# Patient Record
Sex: MALE | Race: WHITE | Employment: OTHER | URBAN - NONMETROPOLITAN AREA
[De-identification: names, ages, dates, MRNs, and addresses within clinical notes are randomized per-mention and may not be internally consistent; named-entity substitution may affect disease eponyms.]

---

## 2017-01-11 ENCOUNTER — OFFICE VISIT - GICH (OUTPATIENT)
Dept: SURGERY | Facility: OTHER | Age: 72
End: 2017-01-11

## 2017-01-11 DIAGNOSIS — Z98.890 OTHER SPECIFIED POSTPROCEDURAL STATES: ICD-10-CM

## 2017-01-13 ENCOUNTER — COMMUNICATION - GICH (OUTPATIENT)
Dept: FAMILY MEDICINE | Facility: OTHER | Age: 72
End: 2017-01-13

## 2017-01-13 DIAGNOSIS — E78.5 HYPERLIPIDEMIA: ICD-10-CM

## 2017-05-16 ENCOUNTER — AMBULATORY - GICH (OUTPATIENT)
Dept: FAMILY MEDICINE | Facility: OTHER | Age: 72
End: 2017-05-16

## 2017-05-16 DIAGNOSIS — L30.9 DERMATITIS: ICD-10-CM

## 2017-05-19 ENCOUNTER — COMMUNICATION - GICH (OUTPATIENT)
Dept: FAMILY MEDICINE | Facility: OTHER | Age: 72
End: 2017-05-19

## 2017-05-19 ENCOUNTER — AMBULATORY - GICH (OUTPATIENT)
Dept: FAMILY MEDICINE | Facility: OTHER | Age: 72
End: 2017-05-19

## 2017-05-19 DIAGNOSIS — L30.9 DERMATITIS: ICD-10-CM

## 2017-05-23 ENCOUNTER — AMBULATORY - GICH (OUTPATIENT)
Dept: FAMILY MEDICINE | Facility: OTHER | Age: 72
End: 2017-05-23

## 2017-05-23 ENCOUNTER — COMMUNICATION - GICH (OUTPATIENT)
Dept: FAMILY MEDICINE | Facility: OTHER | Age: 72
End: 2017-05-23

## 2017-05-23 DIAGNOSIS — N13.8 OTHER OBSTRUCTIVE AND REFLUX UROPATHY: ICD-10-CM

## 2017-05-23 DIAGNOSIS — L30.9 DERMATITIS: ICD-10-CM

## 2017-05-23 DIAGNOSIS — N40.1 ENLARGED PROSTATE WITH LOWER URINARY TRACT SYMPTOMS (LUTS): ICD-10-CM

## 2017-06-22 ENCOUNTER — COMMUNICATION - GICH (OUTPATIENT)
Dept: FAMILY MEDICINE | Facility: OTHER | Age: 72
End: 2017-06-22

## 2017-06-22 DIAGNOSIS — N52.9 MALE ERECTILE DYSFUNCTION: ICD-10-CM

## 2017-06-26 ENCOUNTER — COMMUNICATION - GICH (OUTPATIENT)
Dept: FAMILY MEDICINE | Facility: OTHER | Age: 72
End: 2017-06-26

## 2017-06-26 DIAGNOSIS — N52.9 MALE ERECTILE DYSFUNCTION: ICD-10-CM

## 2017-07-24 ENCOUNTER — COMMUNICATION - GICH (OUTPATIENT)
Dept: FAMILY MEDICINE | Facility: OTHER | Age: 72
End: 2017-07-24

## 2017-07-24 DIAGNOSIS — E78.5 HYPERLIPIDEMIA: ICD-10-CM

## 2017-07-24 DIAGNOSIS — I10 ESSENTIAL (PRIMARY) HYPERTENSION: ICD-10-CM

## 2017-08-01 ENCOUNTER — COMMUNICATION - GICH (OUTPATIENT)
Dept: FAMILY MEDICINE | Facility: OTHER | Age: 72
End: 2017-08-01

## 2017-08-01 DIAGNOSIS — N40.1 ENLARGED PROSTATE WITH LOWER URINARY TRACT SYMPTOMS (LUTS): ICD-10-CM

## 2017-08-01 DIAGNOSIS — I10 ESSENTIAL (PRIMARY) HYPERTENSION: ICD-10-CM

## 2017-08-01 DIAGNOSIS — N13.8 OTHER OBSTRUCTIVE AND REFLUX UROPATHY: ICD-10-CM

## 2017-08-01 DIAGNOSIS — E78.5 HYPERLIPIDEMIA: ICD-10-CM

## 2017-08-01 DIAGNOSIS — N52.9 MALE ERECTILE DYSFUNCTION: ICD-10-CM

## 2017-08-02 ENCOUNTER — AMBULATORY - GICH (OUTPATIENT)
Dept: FAMILY MEDICINE | Facility: OTHER | Age: 72
End: 2017-08-02

## 2017-08-30 ENCOUNTER — HISTORY (OUTPATIENT)
Dept: FAMILY MEDICINE | Facility: OTHER | Age: 72
End: 2017-08-30

## 2017-08-30 ENCOUNTER — OFFICE VISIT - GICH (OUTPATIENT)
Dept: FAMILY MEDICINE | Facility: OTHER | Age: 72
End: 2017-08-30

## 2017-08-30 DIAGNOSIS — F41.1 GENERALIZED ANXIETY DISORDER: ICD-10-CM

## 2017-08-30 DIAGNOSIS — I60.9 NONTRAUMATIC SUBARACHNOID HEMORRHAGE (H): ICD-10-CM

## 2017-08-30 DIAGNOSIS — E78.5 HYPERLIPIDEMIA: ICD-10-CM

## 2017-08-30 DIAGNOSIS — N40.1 ENLARGED PROSTATE WITH LOWER URINARY TRACT SYMPTOMS (LUTS): ICD-10-CM

## 2017-08-30 DIAGNOSIS — I10 ESSENTIAL (PRIMARY) HYPERTENSION: ICD-10-CM

## 2017-08-30 DIAGNOSIS — S06.9X5S: ICD-10-CM

## 2017-08-30 DIAGNOSIS — R97.20 ELEVATED PROSTATE SPECIFIC ANTIGEN (PSA): ICD-10-CM

## 2017-08-30 DIAGNOSIS — N42.9 DISORDER OF PROSTATE: ICD-10-CM

## 2017-08-30 LAB
A/G RATIO - HISTORICAL: 1.8 (ref 1–2)
ABSOLUTE BASOPHILS - HISTORICAL: 0 THOU/CU MM
ABSOLUTE EOSINOPHILS - HISTORICAL: 0.1 THOU/CU MM
ABSOLUTE IMMATURE GRANULOCYTES(METAS,MYELOS,PROS) - HISTORICAL: 0 THOU/CU MM
ABSOLUTE LYMPHOCYTES - HISTORICAL: 0.9 THOU/CU MM (ref 0.9–2.9)
ABSOLUTE MONOCYTES - HISTORICAL: 0.6 THOU/CU MM
ABSOLUTE NEUTROPHILS - HISTORICAL: 3.4 THOU/CU MM (ref 1.7–7)
ALBUMIN SERPL-MCNC: 4.5 G/DL (ref 3.5–5.7)
ALP SERPL-CCNC: 45 IU/L (ref 34–104)
ALT (SGPT) - HISTORICAL: 12 IU/L (ref 7–52)
ANION GAP - HISTORICAL: 11 (ref 5–18)
AST SERPL-CCNC: 23 IU/L (ref 13–39)
BASOPHILS # BLD AUTO: 0.6 %
BILIRUB SERPL-MCNC: 1 MG/DL (ref 0.3–1)
BUN SERPL-MCNC: 15 MG/DL (ref 7–25)
BUN/CREAT RATIO - HISTORICAL: 15
CALCIUM SERPL-MCNC: 9.2 MG/DL (ref 8.6–10.3)
CHLORIDE SERPLBLD-SCNC: 105 MMOL/L (ref 98–107)
CHOL/HDL RATIO - HISTORICAL: 3.11
CHOLESTEROL TOTAL: 196 MG/DL
CO2 SERPL-SCNC: 24 MMOL/L (ref 21–31)
CREAT SERPL-MCNC: 0.97 MG/DL (ref 0.7–1.3)
EOSINOPHIL NFR BLD AUTO: 1.8 %
ERYTHROCYTE [DISTWIDTH] IN BLOOD BY AUTOMATED COUNT: 12.5 % (ref 11.5–15.5)
GFR IF NOT AFRICAN AMERICAN - HISTORICAL: >60 ML/MIN/1.73M2
GLOBULIN - HISTORICAL: 2.5 G/DL (ref 2–3.7)
GLUCOSE SERPL-MCNC: 106 MG/DL (ref 70–105)
HCT VFR BLD AUTO: 40.9 % (ref 37–53)
HDLC SERPL-MCNC: 63 MG/DL (ref 23–92)
HEMOGLOBIN: 14 G/DL (ref 13.5–17.5)
IMMATURE GRANULOCYTES(METAS,MYELOS,PROS) - HISTORICAL: 0.2 %
LDLC SERPL CALC-MCNC: 113 MG/DL
LYMPHOCYTES NFR BLD AUTO: 18.6 % (ref 20–44)
MCH RBC QN AUTO: 31.5 PG (ref 26–34)
MCHC RBC AUTO-ENTMCNC: 34.2 G/DL (ref 32–36)
MCV RBC AUTO: 92 FL (ref 80–100)
MONOCYTES NFR BLD AUTO: 11.7 %
NEUTROPHILS NFR BLD AUTO: 67.1 % (ref 42–72)
NON-HDL CHOLESTEROL - HISTORICAL: 133 MG/DL
PATIENT STATUS - HISTORICAL: ABNORMAL
PLATELET # BLD AUTO: 187 THOU/CU MM (ref 140–440)
PMV BLD: 9 FL (ref 6.5–11)
POTASSIUM SERPL-SCNC: 3.8 MMOL/L (ref 3.5–5.1)
PROT SERPL-MCNC: 7 G/DL (ref 6.4–8.9)
PSA TOTAL (DIAGNOSTIC) - HISTORICAL: 5.23 NG/ML
RED BLOOD COUNT - HISTORICAL: 4.44 MIL/CU MM (ref 4.3–5.9)
SODIUM SERPL-SCNC: 140 MMOL/L (ref 133–143)
TRIGL SERPL-MCNC: 101 MG/DL
TSH - HISTORICAL: 2.79 UIU/ML (ref 0.34–5.6)
WHITE BLOOD COUNT - HISTORICAL: 5.1 THOU/CU MM (ref 4.5–11)

## 2017-08-30 ASSESSMENT — ANXIETY QUESTIONNAIRES
7. FEELING AFRAID AS IF SOMETHING AWFUL MIGHT HAPPEN: NOT AT ALL
2. NOT BEING ABLE TO STOP OR CONTROL WORRYING: NOT AT ALL
6. BECOMING EASILY ANNOYED OR IRRITABLE: NOT AT ALL
5. BEING SO RESTLESS THAT IT IS HARD TO SIT STILL: NOT AT ALL
1. FEELING NERVOUS, ANXIOUS, OR ON EDGE: SEVERAL DAYS
GAD7 TOTAL SCORE: 1
4. TROUBLE RELAXING: NOT AT ALL
3. WORRYING TOO MUCH ABOUT DIFFERENT THINGS: NOT AT ALL

## 2017-10-08 ENCOUNTER — AMBULATORY - GICH (OUTPATIENT)
Dept: FAMILY MEDICINE | Facility: OTHER | Age: 72
End: 2017-10-08
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
## 2017-10-09 ENCOUNTER — OFFICE VISIT - GICH (OUTPATIENT)
Dept: FAMILY MEDICINE | Facility: OTHER | Age: 72
End: 2017-10-09

## 2017-10-09 ENCOUNTER — AMBULATORY - GICH (OUTPATIENT)
Dept: FAMILY MEDICINE | Facility: OTHER | Age: 72
End: 2017-10-09

## 2017-11-21 ENCOUNTER — AMBULATORY - GICH (OUTPATIENT)
Dept: SCHEDULING | Facility: OTHER | Age: 72
End: 2017-11-21

## 2017-12-13 ENCOUNTER — COMMUNICATION - GICH (OUTPATIENT)
Dept: FAMILY MEDICINE | Facility: OTHER | Age: 72
End: 2017-12-13

## 2017-12-13 DIAGNOSIS — N13.8 OTHER OBSTRUCTIVE AND REFLUX UROPATHY: ICD-10-CM

## 2017-12-13 DIAGNOSIS — I10 ESSENTIAL (PRIMARY) HYPERTENSION: ICD-10-CM

## 2017-12-13 DIAGNOSIS — E78.5 HYPERLIPIDEMIA: ICD-10-CM

## 2017-12-13 DIAGNOSIS — N40.1 ENLARGED PROSTATE WITH LOWER URINARY TRACT SYMPTOMS (LUTS): ICD-10-CM

## 2017-12-27 NOTE — PROGRESS NOTES
Patient Information     Patient Name MRN Chao Brizuela [id number] Male 1945      Progress Notes by Chan Cook MD at 2017  8:45 AM     Author:  Chan Cook MD Service:  (none) Author Type:  Physician     Filed:  2017  9:43 AM Encounter Date:  2017 Status:  Signed     :  Chan Cook MD (Physician)            Nursing Notes:   Dorothea Shen  2017  9:22 AM  Signed  Pt presents for medication refills and mole check.  Dorothea Shen ....................  2017   8:55 AM      SUBJECTIVE:  Chao Kearney  is a 71 y.o. male who comes in today for follow-up and medication refill. I last saw him about a year ago when he had a hernia. He had it repaired and did well. He also had cataract surgery last year.    He had a significant hemorrhagic stroke from which he's had nearly a full recovery except for some intermittent trouble with short-term memory. He is exercising daily, looking forward to bow hunting, and is doing well. He had a previous elevated PSA and that's been evaluated and showed no evidence of cancer.    He is up-to-date on health maintenance issues. He is feeling well. He works out a lot. He does 5 days a week one hour per day. He does the [employer] for 30 minutes. He walks for 3 miles 7 days per week.  He enjoys bow hunting.  He is able to recite the machines he uses in order.     Past Medical, Family, and Social History reviewed and updated as noted below.   ROS is negative except as noted above       No Known Allergies,   Family History       Problem   Relation Age of Onset     Heart Disease  Father 50     heart failure        Other  Father       MS, long-standing       Cancer  Mother 78      lung cancer at age 78, developed brain metastases        Cancer  Paternal Grandfather       of cancer of unknown type       Other  Sister       MS     ,   Current Outpatient Prescriptions on File Prior to Visit       Medication  Sig Dispense Refill      aspirin enteric coated (ASPIR-81) 81 mg tablet Take 81 mg by mouth once daily with a meal.       cholecalciferol (VITAMIN D) 1,000 unit capsule Take 1 capsule by mouth once daily.  0     hydroCHLOROthiazide 12.5 mg tablet Take 1 tablet by mouth once daily. 90 tablet 3     lisinopril (PRINIVIL; ZESTRIL) 40 mg tablet Take 1 tablet by mouth once daily. 90 tablet 3     Multivitamin,Tx-Iron-Minerals (MULTILEX-T&M) tab Take 1 Tab by mouth once daily.  0     rosuvastatin (CRESTOR) 20 mg tablet Take 1 tablet by mouth at bedtime. 90 tablet 3     sildenafil (REVATIO) 20 mg tablet 2-3 tab po daily prn prior to sexual activity 30 tablet prn     sildenafil citrate (VIAGRA) 100 mg tablet Take 1 tablet by mouth once daily if needed for Erectile Dysfunction. Take 30min to 4 hours before sexual activity. Max 100mg/24hr. 6 tablet 11     tamsulosin (FLOMAX) 0.4 mg capsule Take 2 capsules by mouth once daily after a meal. 180 capsule prn     triamcinolone (ARISTOCORT; KENALOG) 0.1 % cream Apply  topically to affected area(s) 3 times daily. 30 g 3     No current facility-administered medications on file prior to visit.    ,   Past Medical History:     Diagnosis  Date     Abnormal prostate specific antigen      Actinic keratosis      Anemia      Bony growth 1/05     Lingual sean, bony growth, lower mandible, evaluation Dr. Marin 1/05       Brain injury (HC)      Degenerative joint disease (DJD) of hip      Diverticular disease of left colon      Generalized anxiety disorder      Hx of adenomatous colonic polyps      Hx of complete electrocardiogram 05/16/05    Q wave in 3 and AVF leads on electrocardiogram done       Hydrocephalus     (obstructive)      Hyperlipemia      Hypertension      Joint stiffness      Lesion of mouth     Floor of mouth lesion, lower mandible lesion, evaluation Dr. Marin 1/24/05       Normal stress echocardiogram 05/20/2005    Stress echocardiogram is negative for ischemia with ejection fraction of 85%       Plantar fasciitis, right      Right hip pain      SAH (subarachnoid hemorrhage) (HC)      Seborrheic keratosis      Urinary retention    ,   Patient Active Problem List       Diagnosis  Date Noted     Benign non-nodular prostatic hyperplasia with lower urinary tract symptoms  07/27/2016     Brain injury (HC)  01/16/2015     Urinary retention  12/12/2014     Right hip pain  10/10/2014     SAH (subarachnoid hemorrhage) (HC)  09/22/2014     Obstructive Hydrocephalus  09/22/2014     Anemia  09/22/2014     Actinic keratosis  01/28/2014     Seborrheic keratosis  01/28/2014     H/O adenomatous polyp of colon  04/27/2010     Diverticulosis of sigmoid colon  04/26/2010     HYPERLIPIDEMIA       PLANTAR FASCIITIS       right          HYPERTENSION       ELEVATED PROSTATE SPECIFIC ANTIGEN       in October 2005.  The patient is taking Levaquin daily for two months.  He   will have another PSA checked in October 2006, recommended by Dr. Wilson.          ANXIETY DISORDER, GENERALIZED     ,   Past Surgical History:      Procedure  Laterality Date     CATARACT REMOVAL Bilateral 2016     COLONOSCOPY DIAGNOSTIC  10/15/15    F/U 2025       COLONOSCOPY SCREENING  (04/26/2010)    F/U 2015       CRANIOTOMY  2014     HERNIA REPAIR Left 11/29/2016    LIH with mesh       FL TOTAL HIP ARTHROPLASTY Left 3/26/14    direct anterior approach, Dr. Lama      and   Social History      Substance Use Topics        Smoking status:  Never Smoker     Smokeless tobacco:  Never Used     Alcohol use  0.0 oz/week     0 Standard drinks or equivalent per week      OBJECTIVE:  /80  Pulse 60  Wt 86 kg (189 lb 9.6 oz)  BMI 28.83 kg/m2   EXAM:  General Appearance: Pleasant, alert, appropriate appearance for age. No acute distress  Head Exam: Normal. Normocephalic, atraumatic.  Eye Exam:  Normal external eye, conjunctiva, lids, cornea. IMNDY. EOMI  Ear Exam: Normal TM's bilaterally. Normal auditory canals and external ears. Non-tender.  Nose Exam: Normal  external nose, mucus membranes, and septum.  OroPharynx Exam:  Dental hygiene adequate. Normal buccal mucosa. Normal pharynx.  Neck Exam:  Supple, no masses or nodes. No audible bruits  Thyroid Exam: No nodules or enlargement.  Chest/Respiratory Exam: Normal chest wall and respirations. Clear to auscultation.  Cardiovascular Exam: Regular rate and rhythm. S1, S2, no murmur, click, gallop, or rubs.  Gastrointestinal Exam: Soft, non-tender, no masses or organomegaly.  Lymphatic Exam: Non-palpable nodes in neck, clavicular, axillary, or inguinal regions.  Musculoskeletal Exam: Back is straight and non-tender, full ROM of upper and lower extremities.  Foot Exam: Left and right foot: good pedal pulses  Skin: no rash or abnormalities  Neurologic Exam: Nonfocal, normal gross motor, tone coordination and no tremor.  Psychiatric Exam: Alert and oriented - appropriate affect.   ASSESSMENT/Plan :      Chao was seen today for medication management and derm problem.    Diagnoses and all orders for this visit:    HYPERTENSION  -     CBC AND DIFFERENTIAL; Future  -     COMP METABOLIC PANEL; Future  -     LIPID PANEL; Future    Hyperlipidemia, unspecified hyperlipidemia type  -     COMP METABOLIC PANEL; Future  -     LIPID PANEL; Future  -     TSH; Future    ELEVATED PROSTATE SPECIFIC ANTIGEN  -     PSA TOTAL (DIAGNOSTIC); Future    ANXIETY DISORDER, GENERALIZED  -     TSH; Future    Brain injury, with LOC > 24 hr with return to prior conscious level, sequela    Benign non-nodular prostatic hyperplasia with lower urinary tract symptoms    SAH (subarachnoid hemorrhage) (HC)    Prostate disorder  -     PSA TOTAL (DIAGNOSTIC); Future    Will notify of lab results when available. Discussed diet, exercise and healthy lifestyle. Continue current medications.   Follow up one year, sooner prn.       Chan Cook MD

## 2017-12-27 NOTE — PROGRESS NOTES
Patient Information     Patient Name MRN Sex Chao Tran [id number] Male 1945      Progress Notes by Paige Batista NP at 10/9/2017  8:45 AM     Author:  Paige Batista NP Service:  (none) Author Type:  PHYS- Nurse Practitioner     Filed:  10/15/2017  4:34 PM Encounter Date:  10/9/2017 Status:  Signed     :  Paige Batista NP (PHYS- Nurse Practitioner)            SUBJECTIVE: Erroneous    Chao Kearney is a 71 y.o. male who presents for     HPI    REVIEW OF SYSTEMS:  ROS    OBJECTIVE:  There were no vitals taken for this visit.    EXAM:   Physical Exam    ASSESSMENT/PLAN:  No diagnosis found.     Plan:

## 2017-12-28 NOTE — TELEPHONE ENCOUNTER
Patient Information     Patient Name MRN Sex Chao Tran [id number] Male 1945      Telephone Encounter by Chan Cook MD at 2017  5:26 PM     Author:  Chan Cook MD Service:  (none) Author Type:  Physician     Filed:  2017  5:26 PM Encounter Date:  2017 Status:  Signed     :  Chan Cook MD (Physician)            Prescription printed and signed.  Chan Cook MD ....................  2017   5:26 PM

## 2017-12-28 NOTE — TELEPHONE ENCOUNTER
Patient Information     Patient Name MRN Sex Chao Tran [id number] Male 1945      Telephone Encounter by Mandy Mcfarlane at 2017  3:47 PM     Author:  Mandy Mcfarlane Service:  (none) Author Type:  (none)     Filed:  2017  3:59 PM Encounter Date:  2017 Status:  Signed     :  Mandy Mcfarlane            The patient stated this was a mistake. He did not need his viagra refilled. He did need some of his Rx's refilled to [facility] though.  Mandy Mcfarlane LPN..................2017   3:58 PM

## 2017-12-28 NOTE — TELEPHONE ENCOUNTER
Patient Information     Patient Name MRN Sex Chao Tran [id number] Male 1945      Telephone Encounter by Juan Arora LPN at 2017  8:11 AM     Author:  Juan Arora LPN Service:  (none) Author Type:  NURS- Licensed Practical Nurse     Filed:  2017  8:12 AM Encounter Date:  2017 Status:  Signed     :  Juan Arora LPN (NURS- Licensed Practical Nurse)            Called to let patient's wife know the Rx was faxed this morning.  Juan Arora LPN ..............2017 8:12 AM

## 2017-12-28 NOTE — TELEPHONE ENCOUNTER
Patient Information     Patient Name MRN Sex Chao Tran [id number] Male 1945      Telephone Encounter by Ita Townsend at 2017  9:28 AM     Author:  Ita Townsend Service:  (none) Author Type:  (none)     Filed:  2017  9:33 AM Encounter Date:  2017 Status:  Signed     :  Ita Townsend            Spoke with patients wife. States patient had rx for Viagra sent to [facility], but they found out that a friend got his rx filled at a pharmacy in [place] for cheaper. Patient needs written rx to be faxed to:  [facility] [place]  Fax [phone]  Rx # [id number]

## 2017-12-28 NOTE — TELEPHONE ENCOUNTER
Patient Information     Patient Name MRN Sex Chao Tran [id number] Male 1945      Telephone Encounter by Annmarie Claros at 2017  1:07 PM     Author:  Annmarie Claros Service:  (none) Author Type:  (none)     Filed:  2017  1:12 PM Encounter Date:  2017 Status:  Signed     :  Annmarie Claros            Patient has a prescription for Viagra 100 mg, he would like the generic Sildenafil instead due to cost.        According to pharmacy, the Sildenafil 20 mg is what they were referring to.  (which he would take 2 or more at a time)    Annmarie Claros LPN....................2017 1:09 PM

## 2017-12-28 NOTE — TELEPHONE ENCOUNTER
Patient Information     Patient Name MRN Sex Chao Tran [id number] Male 1945      Telephone Encounter by Mandy Villa RN at 2017  9:06 AM     Author:  Mandy Villa RN Service:  (none) Author Type:  NURS- Registered Nurse     Filed:  2017  9:09 AM Encounter Date:  2017 Status:  Signed     :  Mandy Villa RN (NURS- Registered Nurse)            Ace Inhibitors    Office visit in the past 12 months or per provider note.    Last visit with WALDO LEMA was on: 10/18/2016 in [facility]  Next visit with WALDO LEMA is on: No future appointment listed with this provider  Next visit with Family Practice is on: No future appointment listed in this department    Lab test requirements:  Creatinine and Potassium annually, if ordering lab, order BMP.  CREATININE (mg/dL)    Date Value   2016 1.00     POTASSIUM (mmol/L)    Date Value   2016 4.0       Max refill for 12 months from last office visit or per provider note  Diuretics (may be prescribed for edema)    Office visit in the past 12 months or per provider note.      Lab testing requirements:  Creatinine and Potassium annually, if ordering lab, order BMP.  CREATININE (mg/dL)    Date Value   2016 1.00     POTASSIUM (mmol/L)    Date Value   2016 4.0     BP Readings from Last 4 Encounters:    17 120/64   16 120/62   16 113/61   16 120/78         Review last provider visit note.  If BP reviewed and plan is noted, can refill.  Max refill for 12 months from last office visit or per provider note.  Statins    Office visit in the past 12 months.      Lab testing requirements:  Lipids annually.  Repeat lipids 6-8 weeks after dosage or drug change.    Last Lipids:  Chol: 156    3/11/2015  T    3/11/2015  HDL:   52    3/11/2015  LDL:  88    3/11/2015  LDL DIRECT:  No results found in past 5 years    .    Concommitant use of fibrates and statins-If it is an addition  to the medication list, review note and/or discuss with provider.  If already on medication list, refill.    Max refills 12 months from last office visit.    Prescription refilled per RN Medication Refill Policy.................... SRINI ASKEW RN ....................  7/27/2017   9:07 AM

## 2017-12-28 NOTE — TELEPHONE ENCOUNTER
Patient Information     Patient Name MRN Sex Chao Tran [id number] Male 1945      Telephone Encounter by Darling Bush RN at 2017 12:07 PM     Author:  Darling Bush RN Service:  (none) Author Type:  NURS- Registered Nurse     Filed:  2017 12:20 PM Encounter Date:  2017 Status:  Signed     :  Darling Bush RN (NURS- Registered Nurse)            This is a Refill request from: Per call center-patient calls requesting refill of Viagra sent to [facility]. Patient unavailable via phone-line busy    Per 17 telephone encounter-Spoke with patients wife. States patient had rx for Viagra sent to [facility], but they found out that a friend got his rx filled at a pharmacy in [place] for cheaper. Patient needs written rx to be faxed to:  [facility]  Fax [phone]  Rx # [id number]    Name of Medication: Viagra 100 mg  Quantity requested: 6 tabs x 11 refills  Last fill date: 17 -6 tabs x 11 refills  Due for refill: ?  Last visit with WALDO LEMA was on: 10/18/2016   Diagnosis r/t this medication request: Erectile dysfunction     Unable to complete prescription refill per RN Medication Refill Policy.................... Darling Bush RN ....................  2017   12:10 PM

## 2017-12-29 NOTE — PATIENT INSTRUCTIONS
Patient Information     Patient Name MRN Sex Chao Tran [id number] Male 1945      Patient Instructions by Chan Cook MD at 2017  8:45 AM     Author:  Chan Cook MD  Service:  (none) Author Type:  Physician     Filed:  2017  9:40 AM  Encounter Date:  2017 Status:  Addendum     :  Chan Cook MD (Physician)        Related Notes: Original Note by Chan Cook MD (Physician) filed at 2017  9:29 AM            Ok to continue coenzyme q 10.    Flu shots aren't in yet.    Will notify you of lab results when available.     Continue current medications.

## 2017-12-30 NOTE — NURSING NOTE
Patient Information     Patient Name MRN Sex Chao Tran [id number] Male 1945      Nursing Note by Dorothea Shen at 2017  8:45 AM     Author:  Dorothea Shen Service:  (none) Author Type:  (none)     Filed:  2017  9:22 AM Encounter Date:  2017 Status:  Signed     :  Dorothea Shen            Pt presents for medication refills and mole check.  Dorothea Shen ....................  2017   8:55 AM

## 2017-12-30 NOTE — NURSING NOTE
Patient Information     Patient Name MRN Sex Chao Tran [id number] Male 1945      Nursing Note by Reba Jacques at 10/9/2017  8:45 AM     Author:  Reba Jacquse Service:  (none) Author Type:  (none)     Filed:  10/9/2017  8:20 AM Encounter Date:  10/9/2017 Status:  Signed     :  Reba Jacques            Patient presents to clinic today for tick bite on abdomen. He states he found it this past Saturday and it was very attached and had trouble getting it off, but is unsure if he got it all.  He states it is very itchy. He is currently taking doxycycline that was prescribed to his wife in .    Reba Jacques LPN...................10/9/2017  8:14 AM

## 2018-01-02 NOTE — NURSING NOTE
Patient Information     Patient Name MRN Sex Chao Tran [id number] Male 1945      Nursing Note by Madeline Gonzalez at 2017  9:40 AM     Author:  Madeline Gonzalez Service:  (none) Author Type:  (none)     Filed:  2017 10:19 AM Encounter Date:  2017 Status:  Signed     :  Madeline Gonzalez            Patient comes in for follow up on hernia.  Madeline Gonzalez LPN ....................  2017   10:19 AM

## 2018-01-03 NOTE — TELEPHONE ENCOUNTER
"Patient Information     Patient Name MRN Chao Brizuela [id number] Male 1945      Telephone Encounter by Darling Bush RN at 2017  8:24 AM     Author:  Darling Bush RN Service:  (none) Author Type:  NURS- Registered Nurse     Filed:  2017  8:32 AM Encounter Date:  2017 Status:  Signed     :  Darling Bush RN (NURS- Registered Nurse)            Wife calls for , \" My  is almost out of Crestor. He has 3 pills left. Could we get like 30 sent to a local pharmacy until [facility] sorts this out?  [facility]\".     2016  Ordered by: WALDO LEMA  Medication:rosuvastatin (CRESTOR) 20 mg tablet  Qty:90 tablet   Ref:4  Start:2016   End:  Route:Oral               ABAD:No   Class:eRX  Sig:Take 1 tablet by mouth at bedtime.  Pharmacy:[facility] - [address]    Statins  Office visit in the past 12 months.  Last visit with WALDO LEMA was on: 10/18/2016 in [facility]  Next visit with WALDO LEMA is on: No future appointment listed with this provider  Lab testing requirements:  Lipids annually.  Repeat lipids 6-8 weeks after dosage or drug change.  Last Lipids:  Chol: 156    3/11/2015  T    3/11/2015  HDL:   52    3/11/2015  LDL:  88    3/11/2015  LDL DIRECT:  No results found in past 5 years    .  Concommitant use of fibrates and statins-If it is an addition to the medication list, review note and/or discuss with provider.  If already on medication list, refill.  Max refills 12 months from last office visit.    Prescription refilled per RN Medication Refill Policy.................... Darling Bush RN ....................  2017   8:31 AM              "

## 2018-01-03 NOTE — PROGRESS NOTES
Patient Information     Patient Name MRN Sex Chao Tran [id number] Male 1945      Progress Notes by Laurent Gold MD at 2017  9:40 AM     Author:  Laurent Gold MD Service:  (none) Author Type:  Physician     Filed:  2017 10:37 AM Encounter Date:  2017 Status:  Signed     :  Laurent Gold MD (Physician)            Subjective:  This is a follow up after left inguinal hernia repair on 16. The patient has no complaints. He has resumed full activity.    Objective: /64  Temp 96.9  F (36.1  C) (Tympanic)  Wt 87.5 kg (193 lb)  BMI 29.35 kg/m2  The incision is healing well without erythema. No bulging    Assessment:   Doing well after left inguinal hernia repair    Plan:  Follow-up as needed

## 2018-01-05 NOTE — TELEPHONE ENCOUNTER
Patient Information     Patient Name MRN Sex Chao Tran [id number] Male 1945      Telephone Encounter by Eliu Breaux RN at 2017  3:19 PM     Author:  Eliu Breaux RN Service:  (none) Author Type:  NURS- Registered Nurse     Filed:  2017  3:28 PM Encounter Date:  2017 Status:  Signed     :  Eliu Breaux RN (NURS- Registered Nurse)            Redundant Refill Request for Triamcinolone refused;     Prescribing Provider: Waldo Cook MD                   Order Date: 2017  Ordered by: CHARLEY FERNANDES  Medication:triamcinolone (ARISTOCORT; KENALOG) 0.1 % cream    Qty:30 g        Ref:3  Start:2017   End:              Route:Topical               ABAD:No   Class:eRx    Sig:Apply  topically to affected area(s) 3 times daily.    Pharmacy:[facility] - [address]    Cosign accepted by WALDO COOK[[id number]] on 2017  8:08 AM    Unable to complete prescription refill per RN Medication Refill Policy.................... Eliu Breaux RN ....................  2017   3:20 PM

## 2018-01-05 NOTE — TELEPHONE ENCOUNTER
Patient Information     Patient Name MRN Sex Chao Tran [id number] Male 1945      Telephone Encounter by Eliu Breaux RN at 2017  8:58 AM     Author:  Eliu Breaux RN Service:  (none) Author Type:  NURS- Registered Nurse     Filed:  2017  9:07 AM Encounter Date:  2017 Status:  Signed     :  Eliu Breaux RN (NURS- Registered Nurse)            This is a Refill request from: Patient's wife  Name of Medication: Flomax  Quantity requested: 180 tabs  Last fill date: Unknown as request if from patient's wife  Due for refill: Yes, as per patient's wife  Last visit with WALDO COOK was on: 10/18/2016 in [facility]  PCP:  Waldo Cook MD  Controlled Substance Agreement:  N/A   Diagnosis r/t this medication request: BPH with urinary obstruction    Current rx in chart as listed below:    Documenting Provider: Waldo Cook MD                 Order Date: 2016  Ordered by: SRINI HERNÁNDEZ  Medication:tamsulosin (FLOMAX) 0.4 mg capsule    Qty:180 capsu*  Ref:3  Start:2016   End:              Route:Oral                  ABAD:No   Class:Historical Med    Sig:Take 1 capsule by mouth once daily after a meal.    Note to Pharmacy:Dose increased to 2 tab    Medication is listed as historical. Writer unable to find that PCP has addressed rx since 16, or on 16. Conflicting notation in rx as listed above. Is patient to be taking 2 tabs daily, or one tab daily? Writer did confirm dosing with patient's wife, Juana. She states patient is taking 2 tabs daily. Will update sig to reflect as per patient's wife report, and route request to PCP for his consideration/approval.     Unable to complete prescription refill per RN Medication Refill Policy.................... Eliu Breaux RN ....................  2017   8:59 AM

## 2018-01-05 NOTE — TELEPHONE ENCOUNTER
Patient Information     Patient Name MRN Sex Chao Tran [id number] Male 1945      Telephone Encounter by Annmarie Fernandes RN at 2017  3:58 PM     Author:  Annmarie Fernandes RN Service:  (none) Author Type:  NURS- Registered Nurse     Filed:  2017  4:01 PM Encounter Date:  2017 Status:  Signed     :  Annmarie Fernandes RN (NURS- Registered Nurse)            [facility] by Dr[facility] Cook, [facility]  Prescribing Provider: Waldo Cook MD                 Order Date: 2017  Ordered by: WALDO COOK V  Medication:triamcinolone (ARISTOCORT; KENALOG) 0.1 % cream    Qty:30 g        Ref:3  Start:2017   End:              Route:Topical               ABAD:No   Class:eRx    Sig:Apply  topically to affected area(s) 3 times daily.    Pharmacy:[facility] [address] [place] [address]   [address] - [phone]    Prescription refilled per RN Medication Refill Policy.................... ANNMARIE FERNANDES RN ....................  2017   4:00 PM

## 2018-01-15 ENCOUNTER — AMBULATORY - GICH (OUTPATIENT)
Dept: FAMILY MEDICINE | Facility: OTHER | Age: 73
End: 2018-01-15

## 2018-01-17 PROBLEM — R97.20 ELEVATED PROSTATE SPECIFIC ANTIGEN (PSA): Status: ACTIVE | Noted: 2018-01-17

## 2018-01-17 PROBLEM — I10 HYPERTENSION: Status: ACTIVE | Noted: 2018-01-17

## 2018-01-17 PROBLEM — F41.1 GENERALIZED ANXIETY DISORDER: Status: ACTIVE | Noted: 2018-01-17

## 2018-01-17 PROBLEM — M72.2 PLANTAR FASCIITIS: Status: ACTIVE | Noted: 2018-01-17

## 2018-01-17 PROBLEM — E78.5 HYPERLIPIDEMIA: Status: ACTIVE | Noted: 2018-01-17

## 2018-01-17 RX ORDER — ASPIRIN 81 MG/1
81 TABLET ORAL
Status: ON HOLD | COMMUNITY
End: 2022-05-18

## 2018-01-17 RX ORDER — HYDROCHLOROTHIAZIDE 12.5 MG/1
1 TABLET ORAL DAILY
COMMUNITY
Start: 2017-12-13 | End: 2018-07-10

## 2018-01-17 RX ORDER — LISINOPRIL 40 MG/1
1 TABLET ORAL DAILY
COMMUNITY
Start: 2017-12-13 | End: 2018-07-10

## 2018-01-17 RX ORDER — ROSUVASTATIN CALCIUM 20 MG/1
1 TABLET, COATED ORAL AT BEDTIME
COMMUNITY
Start: 2017-12-13 | End: 2018-07-10

## 2018-01-17 RX ORDER — SILDENAFIL CITRATE 20 MG/1
2-3 TABLET ORAL PRN
COMMUNITY
Start: 2017-06-22 | End: 2018-12-06

## 2018-01-17 RX ORDER — TRIAMCINOLONE ACETONIDE 1 MG/G
CREAM TOPICAL
COMMUNITY
Start: 2017-05-19 | End: 2019-07-05

## 2018-01-17 RX ORDER — TAMSULOSIN HYDROCHLORIDE 0.4 MG/1
2 CAPSULE ORAL
COMMUNITY
Start: 2017-12-13 | End: 2018-12-19

## 2018-01-17 RX ORDER — SILDENAFIL 100 MG/1
1 TABLET, FILM COATED ORAL PRN
COMMUNITY
Start: 2017-08-01 | End: 2018-12-06

## 2018-01-27 VITALS
BODY MASS INDEX: 29.35 KG/M2 | WEIGHT: 193 LBS | SYSTOLIC BLOOD PRESSURE: 120 MMHG | DIASTOLIC BLOOD PRESSURE: 64 MMHG | TEMPERATURE: 96.9 F

## 2018-01-28 VITALS
DIASTOLIC BLOOD PRESSURE: 80 MMHG | WEIGHT: 189.6 LBS | BODY MASS INDEX: 28.83 KG/M2 | HEART RATE: 60 BPM | SYSTOLIC BLOOD PRESSURE: 124 MMHG

## 2018-02-02 ASSESSMENT — ANXIETY QUESTIONNAIRES: GAD7 TOTAL SCORE: 1

## 2018-02-06 ENCOUNTER — OFFICE VISIT - GICH (OUTPATIENT)
Dept: FAMILY MEDICINE | Facility: OTHER | Age: 73
End: 2018-02-06

## 2018-02-06 ENCOUNTER — HISTORY (OUTPATIENT)
Dept: FAMILY MEDICINE | Facility: OTHER | Age: 73
End: 2018-02-06

## 2018-02-06 DIAGNOSIS — J40 BRONCHITIS: ICD-10-CM

## 2018-02-09 VITALS
SYSTOLIC BLOOD PRESSURE: 126 MMHG | RESPIRATION RATE: 20 BRPM | WEIGHT: 193 LBS | TEMPERATURE: 96.8 F | HEART RATE: 68 BPM | DIASTOLIC BLOOD PRESSURE: 80 MMHG | BODY MASS INDEX: 29.35 KG/M2

## 2018-02-12 NOTE — TELEPHONE ENCOUNTER
Patient Information     Patient Name MRN Sex Chao Tran [id number] Male 1945      Telephone Encounter by Martine Bowie RN at 2017 10:23 AM     Author:  Martine Bowie RN Service:  (none) Author Type:  NURS- Registered Nurse     Filed:  2017 10:24 AM Encounter Date:  2017 Status:  Signed     :  Martine Bowie RN (NURS- Registered Nurse)            Contacted daughterRandolph and let her know that 4 Rx for Patient were sent to [facility] in [place] and were sent for 1-month supply. Martine Bowie RN .............. 2017  10:24 AM

## 2018-02-12 NOTE — TELEPHONE ENCOUNTER
Patient Information     Patient Name MRN Sex Chao Tran [id number] Male 1945      Telephone Encounter by Martine Bowie RN at 2017 10:01 AM     Author:  Martine Bowie RN  Service:  (none) Author Type:  NURS- Registered Nurse     Filed:  2017 10:23 AM  Encounter Date:  2017 Status:  Addendum     :  Martine Bowie RN (NURS- Registered Nurse)        Related Notes: Original Note by Martine Bowie RN (NURS- Registered Nurse) filed at 2017 10:11 AM            Patient was brought into ED today by paramedics with TIA, and then sent to [place] for further care. DaughterDennis called and is requesting a limited supply of vital [facility] [address] [place] [address], to get him by while he is in [place]. Patient normally gets his prescriptions through mail order.    Daughter would like a call back once these are sent to [facility].    Diuretics (may be prescribed for edema)    Office visit in the past 12 months or per provider note.    Last visit with WALDO LEMA was on: 2017 in [facility]  Next visit with WALDO LEMA is on: No future appointment listed with this provider  Next visit with Family Practice is on: No future appointment listed in this department    Lab testing requirements:  Creatinine and Potassium annually, if ordering lab, order BMP.  CREATININE (mg/dL)    Date Value   2017 0.97     POTASSIUM (mmol/L)    Date Value   2017 3.8     BP Readings from Last 4 Encounters:    17 137/80   17 124/80   17 120/64   16 120/62     Review last provider visit note.  If BP reviewed and plan is noted, can refill.  Max refill for 12 months from last office visit or per provider note.    Ace Inhibitors    Office visit in the past 12 months or per provider note.    Last visit with WALDO LEMA was on: 2017 in [facility]  Next visit with WALDO LEMA is on: No future  appointment listed with this provider  Next visit with Family Practice is on: No future appointment listed in this department    Lab test requirements:  Creatinine and Potassium annually, if ordering lab, order BMP.  CREATININE (mg/dL)    Date Value   2017 0.97     POTASSIUM (mmol/L)    Date Value   2017 3.8     Max refill for 12 months from last office visit or per provider note    Statins    Office visit in the past 12 months.    Last visit with WALDO LEMA was on: 2017 in [facility]  Next visit with WALDO LEMA is on: No future appointment listed with this provider  Next visit with Family Practice is on: No future appointment listed in this department    Last Lipids:  Chol: 196    2017  T    2017  HDL:   63    2017  LDL:  113    2017  LDL DIRECT:  No results found in past 5 years    .    Concommitant use of fibrates and statins-If it is an addition to the medication list, review note and/or discuss with provider.  If already on medication list, refill.    Max refills 12 months from last office visit.      BPH    Office visit in the past 12 months or per provider note.    Last visit with WALDO LEMA was on: 2017 in [facility]  Next visit with WALDO LEMA is on: No future appointment listed with this provider  Next visit with Family Practice is on: No future appointment listed in this department    Max refill for 12 months from last office visit or per provider note.    Prescription refilled per RN Medication Refill Policy.................... Martine Bowie RN ....................  2017   10:21 AM

## 2018-02-13 NOTE — NURSING NOTE
Patient Information     Patient Name MRN Chao Brizuela [id number] Male 1945      Nursing Note by Erin Aceves at 2018 10:00 AM     Author:  Erin Aceves Service:  (none) Author Type:  (none)     Filed:  2018 10:12 AM Encounter Date:  2018 Status:  Signed     :  Erin Aceves            Patient presents in the clinic with concerns of an ongoing cough. Patient stated 3 years ago he had a brain bleed that was attributed to an agressive cough.  Erin Aceves LPN............................ 2018 10:12 AM

## 2018-02-13 NOTE — PROGRESS NOTES
Patient Information     Patient Name MRN Sex     Chao Kearney [id number] Male 1945      Progress Notes by Vignesh Sharma MD at 2018 10:00 AM     Author:  Vignesh Sharma MD Service:  (none) Author Type:  Physician     Filed:  2018 10:31 AM Encounter Date:  2018 Status:  Signed     :  Vignesh Sharma MD (Physician)            Nursing Notes:   Erin Aceves  2018 10:12 AM  Signed  Patient presents in the clinic with concerns of an ongoing cough. Patient stated 3 years ago he had a brain bleed that was attributed to an agressive cough.  Erin Aceves, KHANHN............................ 2018 10:12 AM      Chao Kearney is a 72 y.o. male who presents for   Chief Complaint    Patient presents with      Cough     HPI: Mr. Kearney has had a tough cough for 2 days and is worried as he had a brain bleed 3 years ago which may have happened with coughing. Afeb. This came on rather quickly.    Past Medical History:     Diagnosis  Date     Abnormal prostate specific antigen      Actinic keratosis      Anemia      Bony growth      Lingual sean, bony growth, lower mandible, evaluation Dr. Marin        Brain injury (HC)      Degenerative joint disease (DJD) of hip      Diverticular disease of left colon      Generalized anxiety disorder      Hx of adenomatous colonic polyps      Hx of complete electrocardiogram 05    Q wave in 3 and AVF leads on electrocardiogram done       Hydrocephalus     (obstructive)      Hyperlipemia      Hypertension      Joint stiffness      Lesion of mouth     Floor of mouth lesion, lower mandible lesion, evaluation Dr. Marin 05       Normal stress echocardiogram 2005    Stress echocardiogram is negative for ischemia with ejection fraction of 85%      Plantar fasciitis, right      Right hip pain      SAH (subarachnoid hemorrhage) (HC)      Seborrheic keratosis      Urinary retention      Past Surgical History:      Procedure   Laterality Date     CATARACT REMOVAL Bilateral 2016     COLONOSCOPY DIAGNOSTIC  10/15/15    F/U        COLONOSCOPY SCREENING  (2010)    F/U        CRANIOTOMY  2014     HERNIA REPAIR Left 2016    LIH with mesh       NM TOTAL HIP ARTHROPLASTY Left 3/26/14    direct anterior approach, Dr. Lama       Family History       Problem   Relation Age of Onset     Heart Disease  Father 50     heart failure        Other  Father       MS, long-standing       Cancer  Mother 78      lung cancer at age 78, developed brain metastases        Cancer  Paternal Grandfather       of cancer of unknown type       Other  Sister       MS       Current Outpatient Prescriptions       Medication  Sig Dispense Refill     aspirin enteric coated (ASPIR-81) 81 mg tablet Take 81 mg by mouth once daily with a meal.       cholecalciferol (VITAMIN D) 1,000 unit capsule Take 1 capsule by mouth once daily.  0     hydroCHLOROthiazide 12.5 mg tablet Take 1 tablet by mouth once daily. 30 tablet 0     lisinopril (PRINIVIL; ZESTRIL) 40 mg tablet Take 1 tablet by mouth once daily. 30 tablet 0     Multivitamin,Tx-Iron-Minerals (MULTILEX-T&M) tab Take 1 Tab by mouth once daily.  0     rosuvastatin (CRESTOR) 20 mg tablet Take 1 tablet by mouth at bedtime. 30 tablet 0     sildenafil (REVATIO) 20 mg tablet 2-3 tab po daily prn prior to sexual activity 30 tablet prn     sildenafil citrate (VIAGRA) 100 mg tablet Take 1 tablet by mouth once daily if needed for Erectile Dysfunction. Take 30min to 4 hours before sexual activity. Max 100mg/24hr. 6 tablet 11     tamsulosin (FLOMAX) 0.4 mg capsule Take 2 capsules by mouth once daily after a meal. 60 capsule 0     triamcinolone (ARISTOCORT; KENALOG) 0.1 % cream Apply  topically to affected area(s) 3 times daily. 30 g 3     No current facility-administered medications for this visit.      Medications have been reviewed by me and are current to the best of my knowledge and ability.    No Known  Allergies     EXAM:   Vitals:     02/06/18 1013   BP: 126/80   Cuff Site: Right Arm   Position: Sitting   Cuff Size: Adult Large   Pulse: 68   Resp: 20   Temp: 96.8  F (36  C)   TempSrc: Temporal   Weight: 87.5 kg (193 lb)     General Appearance: Pleasant, alert, appropriate appearance for age. No acute distress  Ear Exam: Normal TM's bilaterally. Normal auditory canals and external ears. Non-tender.  Neck Exam: Supple, no masses or nodes.  Chest/Respiratory Exam: Normal chest wall and respirations. Clear to auscultation.  Cardiovascular Exam: Regular rate and rhythm. S1, S2, no murmur, click, gallop, or rubs.  ASSESSMENT AND PLAN:  Cough/ history brain bleed/ z swetha

## 2018-03-21 ENCOUNTER — OFFICE VISIT (OUTPATIENT)
Dept: FAMILY MEDICINE | Facility: OTHER | Age: 73
End: 2018-03-21
Attending: FAMILY MEDICINE
Payer: COMMERCIAL

## 2018-03-21 VITALS
DIASTOLIC BLOOD PRESSURE: 66 MMHG | SYSTOLIC BLOOD PRESSURE: 122 MMHG | WEIGHT: 189.2 LBS | TEMPERATURE: 97.6 F | HEART RATE: 68 BPM | BODY MASS INDEX: 28.77 KG/M2

## 2018-03-21 DIAGNOSIS — I10 ESSENTIAL HYPERTENSION: ICD-10-CM

## 2018-03-21 DIAGNOSIS — L82.1 SEBORRHEIC KERATOSIS: Primary | ICD-10-CM

## 2018-03-21 DIAGNOSIS — S06.9X9S BRAIN INJURY WITH LOSS OF CONSCIOUSNESS, SEQUELA (H): ICD-10-CM

## 2018-03-21 PROCEDURE — G0463 HOSPITAL OUTPT CLINIC VISIT: HCPCS

## 2018-03-21 PROCEDURE — 99213 OFFICE O/P EST LOW 20 MIN: CPT | Performed by: FAMILY MEDICINE

## 2018-03-21 ASSESSMENT — PAIN SCALES - GENERAL: PAINLEVEL: NO PAIN (0)

## 2018-03-21 NOTE — PROGRESS NOTES
Nursing Notes:   Cyndy Kohli LPN  3/21/2018 12:21 PM  Signed  Patient presents today for recheck of mole on head. Patient is also inquiring about pneumonia shot. Patient was seen by Dr Sharma in February and he recommended he stop his aspirin. He would like to find out PCP thoughts on the aspirin. His left elbow has also been bothering him.    Cyndy Kohli LPN on 3/21/2018 at 12:03 PM    SUBJECTIVE:  Chao Kearney  is a 72 year old male who comes in for a mole on his face.  Been present for some time and we have reassured him about in the past but he wanted to get it checked again.    He has subarachnoid hemorrhage number of years ago.  He has been taking low-dose aspirin for some time it is unclear when he restarted that.  Wondered whether he should be on or not.    He is bothered bit by his left elbow but today it is not bothering at all.    Past Medical, Family, and Social History reviewed and updated as noted below.   ROS is negative except as noted above       No Known Allergies,   Family History   Problem Relation Age of Onset     HEART DISEASE Father 50     Heart Disease,heart failure     Other - See Comments Father       MS, long-standing     CANCER Mother 78     Cancer, lung cancer at age 78, developed brain metastases     CANCER Paternal Grandfather      Cancer, of cancer of unknown type     Other - See Comments Sister       MS   ,   Current Outpatient Prescriptions   Medication     aspirin EC 81 MG EC tablet     Cholecalciferol (VITAMIN D3) 1000 UNITS CAPS     hydrochlorothiazide 12.5 MG TABS tablet     lisinopril (PRINIVIL/ZESTRIL) 40 MG tablet     Multiple Vitamins-Minerals (MULTILEX-T&M) TABS     rosuvastatin (CRESTOR) 20 MG tablet     sildenafil (REVATIO) 20 MG tablet     sildenafil (VIAGRA) 100 MG tablet     tamsulosin (FLOMAX) 0.4 MG capsule     triamcinolone (KENALOG) 0.1 % cream     No current facility-administered medications for this visit.    ,   Past Medical History:    Diagnosis Date     Actinic keratosis     No Comments Provided     Anemia     No Comments Provided     Diverticulosis of large intestine without perforation or abscess without bleeding     No Comments Provided     Elevated prostate specific antigen (PSA)     No Comments Provided     Essential (primary) hypertension     No Comments Provided     Generalized anxiety disorder     No Comments Provided     History of colonic polyps     No Comments Provided     Hydrocephalus     (obstructive)     Hyperlipidemia     No Comments Provided     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 Marin 1/24/05     Nontraumatic subarachnoid hemorrhage (H)     No Comments Provided     Osteoarthritis of hip     No Comments Provided     Other seborrheic keratosis     No Comments Provided     Other specified disorders of bone, unspecified site (CODE)     1/05,Lingual sean, bony growth, lower mandible, evaluation Dr. Marin 1/05     Pain in right hip     No Comments Provided     Personal history of other medical treatment (CODE)     05/16/05,Q wave in 3 and AVF leads on electrocardiogram done     Plantar fascial fibromatosis     No Comments Provided     Retention of urine     No Comments Provided     Screening for other and unspecified cardiovascular conditions     05/20/2005,Stress echocardiogram is negative for ischemia with ejection fraction of 85%     Stiffness of unspecified joint, not elsewhere classified     No Comments Provided   ,   Patient Active Problem List    Diagnosis Date Noted     Generalized anxiety disorder 01/17/2018     Priority: Medium     Elevated prostate specific antigen (PSA) 01/17/2018     Priority: Medium     Overview:   in October 2005.  The patient is taking Levaquin daily for two months.  He   will have another PSA checked in October 2006, recommended by Dr. Wilson.       Hyperlipidemia  01/17/2018     Priority: Medium     Hypertension 01/17/2018     Priority: Medium     Plantar fasciitis 01/17/2018     Priority: Medium     Overview:   right       Benign non-nodular prostatic hyperplasia with lower urinary tract symptoms 07/27/2016     Priority: Medium     Brain injury (H) 01/16/2015     Priority: Medium     Retention of urine 12/12/2014     Priority: Medium     Right hip pain 10/10/2014     Priority: Medium     Anemia 09/22/2014     Priority: Medium     Hydrocephalus 09/22/2014     Priority: Medium     SAH (subarachnoid hemorrhage) (H) 09/22/2014     Priority: Medium     Actinic keratosis 01/28/2014     Priority: Medium     Seborrheic keratosis 01/28/2014     Priority: Medium     H/O adenomatous polyp of colon 04/27/2010     Priority: Medium     Diverticulosis of sigmoid colon 04/26/2010     Priority: Medium   ,   Past Surgical History:   Procedure Laterality Date     COLONOSCOPY      (04/26/2010),F/U 2015     COLONOSCOPY      10/15/15,F/U 2025     EXTRACAPSULAR CATARACT EXTRATION WITH INTRAOCULAR LENS IMPLANT      2016     OTHER SURGICAL HISTORY      3/26/14,14333.0,MT TOTAL HIP ARTHROPLASTY,Left,direct anterior approach, Dr. Lama     OTHER SURGICAL HISTORY      2014,[id number],CRANIOTOMY     OTHER SURGICAL HISTORY      11/29/2016,,HERNIA REPAIR,Left,LIH with mesh    and   Social History   Substance Use Topics     Smoking status: Never Smoker     Smokeless tobacco: Never Used     Alcohol use 0.0 oz/week     OBJECTIVE:  /66 (BP Location: Right arm, Patient Position: Sitting)  Pulse 68  Temp 97.6  F (36.4  C) (Tympanic)  Wt 189 lb 3.2 oz (85.8 kg)  BMI 28.77 kg/m2   EXAM:  Alert and cooperative, accompanied by his wife Juana.  He has a benign seborrheic keratosis on his right upper forehead.  Several other scattered seborrheic keratoses are noted.  Left elbow is not uncomfortable today and has negative exam.  ASSESSMENT/Plan :    Chao was seen today for mole.    Diagnoses and all  "orders for this visit:    Seborrheic keratosis    Brain injury with loss of consciousness, sequela (H)    Essential hypertension      Reassured with regard to seborrheic keratosis.  Expectant management with regard to his elbow.  Unclear whether or not he should be on aspirin or not.  Certainly it is unlikely to be dangerous for him to do so since he has been on it for some time now.  Discussed the fact that there could be debatable directions.  He prefers to stay on it since he is \"been on it for 40 years\".  Discussed this with the patient and his wife.  Offered to see if we could get an opinion from 1 of his neurologist but at this point will hold off.  He will be coming back in August for annual exam and renewal of medications and labs.    A total of 15 minutes was spent with the patient, greater than50% of the time was spent in counseling/discussion of the aforementioned concerns.      Chan Cook MD            "

## 2018-03-21 NOTE — NURSING NOTE
Patient presents today for recheck of mole on head. Patient is also inquiring about pneumonia shot. Patient was seen by Dr Sharma in February and he recommended he stop his aspirin. He would like to find out PCP thoughts on the aspirin. His left elbow has also been bothering him.    Cyndy Kohli LPN on 3/21/2018 at 12:03 PM

## 2018-06-23 ENCOUNTER — HOSPITAL ENCOUNTER (EMERGENCY)
Facility: OTHER | Age: 73
Discharge: HOME | End: 2018-06-23
Attending: EMERGENCY MEDICINE | Admitting: EMERGENCY MEDICINE
Payer: MEDICARE

## 2018-06-23 VITALS
TEMPERATURE: 97.3 F | SYSTOLIC BLOOD PRESSURE: 123 MMHG | BODY MASS INDEX: 27.74 KG/M2 | OXYGEN SATURATION: 96 % | RESPIRATION RATE: 16 BRPM | DIASTOLIC BLOOD PRESSURE: 65 MMHG | WEIGHT: 183 LBS | HEIGHT: 68 IN

## 2018-06-23 DIAGNOSIS — S69.91XA FISH HOOK INJURY OF FINGER OF RIGHT HAND, INITIAL ENCOUNTER: ICD-10-CM

## 2018-06-23 PROCEDURE — 99283 EMERGENCY DEPT VISIT LOW MDM: CPT | Performed by: EMERGENCY MEDICINE

## 2018-06-23 PROCEDURE — 99283 EMERGENCY DEPT VISIT LOW MDM: CPT | Mod: Z6 | Performed by: EMERGENCY MEDICINE

## 2018-06-23 NOTE — ED AVS SNAPSHOT
[facility]    [address]    [place] [address]    Phone:  [phone]    Fax:  [phone]                                       Chao Kearney   MRN: [id number]    Department:  [facility]   Date of Visit:  6/23/2018           After Visit Summary Signature Page     I have received my discharge instructions, and my questions have been answered. I have discussed any challenges I see with this plan with the nurse or doctor.    ..........................................................................................................................................  Patient/Patient Representative Signature      ..........................................................................................................................................  Patient Representative Print Name and Relationship to Patient    ..................................................               ................................................  Date                                            Time    ..........................................................................................................................................  Reviewed by Signature/Title    ...................................................              ..............................................  Date                                                            Time

## 2018-06-23 NOTE — ED AVS SNAPSHOT
[facility]    [address]    [place] [address]    Phone:  [phone]    Fax:  [phone]                                       Chao Kearney   MRN: [id number]    Department:  [facility]   Date of Visit:  6/23/2018           Patient Information     Date Of Birth          1945        Your diagnoses for this visit were:     Fish hook injury of finger of right hand, initial encounter        You were seen by Marco A Catalan MD.        Discharge Instructions       Follow-up as needed    24 Hour Appointment Hotline     To schedule an appointment at [facility], please call [phone]. If you don't have a family doctor or clinic, we will help you find one. [facility] clinics are conveniently located to serve the needs of you and your family.           Review of your medicines      Our records show that you are taking the medicines listed below. If these are incorrect, please call your family doctor or clinic.        Dose / Directions Last dose taken    aspirin 81 MG EC tablet   Dose:  81 mg        Take 81 mg by mouth daily with food   Refills:  0        hydrochlorothiazide 12.5 MG Tabs tablet   Dose:  1 tablet        Take 1 tablet by mouth daily   Refills:  0        lisinopril 40 MG tablet   Commonly known as:  PRINIVIL/ZESTRIL   Dose:  1 tablet        Take 1 tablet by mouth daily   Refills:  0        MULTILEX-T&M Tabs   Dose:  1 tablet        Take 1 tablet by mouth daily   Refills:  0        rosuvastatin 20 MG tablet   Commonly known as:  CRESTOR   Dose:  1 tablet        Take 1 tablet by mouth At Bedtime   Refills:  0        sildenafil 100 MG tablet   Commonly known as:  VIAGRA   Dose:  1 tablet        Take 1 tablet by mouth as needed Take 30 min to 4 hours before sexual activity. Max 100mg/24hr   Refills:  0        sildenafil 20 MG tablet   Commonly known as:  REVATIO   Dose:  2-3 tablet        Take 2-3 tablets by mouth as needed   Refills:  0         tamsulosin 0.4 MG capsule   Commonly known as:  FLOMAX   Dose:  2 capsule        Take 2 capsules by mouth daily with food   Refills:  0        triamcinolone 0.1 % cream   Commonly known as:  KENALOG        Refills:  0        vitamin D3 1000 units Caps   Dose:  1 capsule        Take 1 capsule by mouth daily   Refills:  0                Orders Needing Specimen Collection     None      Pending Results     No orders found from 6/21/2018 to 6/24/2018.            Pending Culture Results     No orders found from 6/21/2018 to 6/24/2018.            Pending Results Instructions     If you had any lab results that were not finalized at the time of your Discharge, you can call the ED Lab Result RN at [phone]. You will be contacted by this team for any positive Lab results or changes in treatment. The nurses are available 7 days a week from 10A to 6:30P.  You can leave a message 24 hours per day and they will return your call.        Thank you for choosing [facility]       Thank you for choosing [facility] for your care. Our goal is always to provide you with excellent care. Hearing back from our patients is one way we can continue to improve our services. Please take a few minutes to complete the written survey that you may receive in the mail after you visit with us. Thank you!        [employer] Information     [employer] gives you secure access to your electronic health record. If you see a primary care provider, you can also send messages to your care team and make appointments. If you have questions, please call your primary care clinic.  If you do not have a primary care provider, please call [phone] and they will assist you.        Care EveryWhere ID     This is your Care EveryWhere ID. This could be used by other organizations to access your [facility] medical records  [id number]        Equal Access to Services     TIAGO ANTUNEZ AH: bryan Fragoso, ted turk  patito pacheco ah. So [facility] [phone].    ATENCIÓN: Si habla español, tiene a diaz disposición servicios gratuitos de asistencia lingüística. Llame al [phone].    We comply with applicable federal civil rights laws and [place] laws. We do not discriminate on the basis of race, color, national origin, age, disability, sex, sexual orientation, or gender identity.            After Visit Summary       This is your record. Keep this with you and show to your community pharmacist(s) and doctor(s) at your next visit.

## 2018-06-23 NOTE — ED PROVIDER NOTES
History     Chief Complaint   Patient presents with     Foreign Body     HPI Comments: This is a 72-year-old male who is presenting to the emergency room concern about the fishhook lodged distal aspect of the right index finger.  Patient states the fishhook got on a branch and is to try to remove it it somehow got lodged into his finger earlier this morning.  He is having some local pain but otherwise feels well.        Problem List:    Patient Active Problem List    Diagnosis Date Noted     Generalized anxiety disorder 01/17/2018     Priority: Medium     Elevated prostate specific antigen (PSA) 01/17/2018     Priority: Medium     Overview:   in October 2005.  The patient is taking Levaquin daily for two months.  He   will have another PSA checked in October 2006, recommended by Dr. Wilson.       Hyperlipidemia 01/17/2018     Priority: Medium     Hypertension 01/17/2018     Priority: Medium     Plantar fasciitis 01/17/2018     Priority: Medium     Overview:   right       Benign non-nodular prostatic hyperplasia with lower urinary tract symptoms 07/27/2016     Priority: Medium     Brain injury (H) 01/16/2015     Priority: Medium     Retention of urine 12/12/2014     Priority: Medium     Right hip pain 10/10/2014     Priority: Medium     Anemia 09/22/2014     Priority: Medium     Hydrocephalus 09/22/2014     Priority: Medium     SAH (subarachnoid hemorrhage) (H) 09/22/2014     Priority: Medium     Actinic keratosis 01/28/2014     Priority: Medium     Seborrheic keratosis 01/28/2014     Priority: Medium     H/O adenomatous polyp of colon 04/27/2010     Priority: Medium     Diverticulosis of sigmoid colon 04/26/2010     Priority: Medium        Past Medical History:    Past Medical History:   Diagnosis Date     Actinic keratosis      Anemia      Diverticulosis of large intestine without perforation or abscess without bleeding      Elevated prostate specific antigen (PSA)      Essential (primary) hypertension       Generalized anxiety disorder      History of colonic polyps      Hydrocephalus      Hyperlipidemia      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)      Lesion of oral mucosa      Nontraumatic subarachnoid hemorrhage (H)      Osteoarthritis of hip      Other seborrheic keratosis      Other specified disorders of bone, unspecified site (CODE)      Pain in right hip      Personal history of other medical treatment (CODE)      Plantar fascial fibromatosis      Retention of urine      Screening for other and unspecified cardiovascular conditions      Stiffness of unspecified joint, not elsewhere classified        Past Surgical History:    Past Surgical History:   Procedure Laterality Date     COLONOSCOPY      (2010),F/U      COLONOSCOPY      10/15/15,F/U      EXTRACAPSULAR CATARACT EXTRATION WITH INTRAOCULAR LENS IMPLANT           OTHER SURGICAL HISTORY      3/26/14,68532.0,CA TOTAL HIP ARTHROPLASTY,Left,direct anterior approach, Dr. Lama     OTHER SURGICAL HISTORY      ,[id number],CRANIOTOMY     OTHER SURGICAL HISTORY      2016,,HERNIA REPAIR,Left,LIH with mesh       Family History:    Family History   Problem Relation Age of Onset     HEART DISEASE Father 50     Heart Disease,heart failure     Other - See Comments Father       MS, long-standing     Cancer Mother 78     Cancer, lung cancer at age 78, developed brain metastases     Cancer Paternal Grandfather      Cancer, of cancer of unknown type     Other - See Comments Sister       MS       Social History:  Marital Status:   [2]  Social History   Substance Use Topics     Smoking status: Never Smoker     Smokeless tobacco: Never Used     Alcohol use 0.0 oz/week        Medications:      aspirin EC 81 MG EC tablet   Cholecalciferol (VITAMIN D3) 1000 UNITS CAPS   hydrochlorothiazide 12.5 MG TABS tablet   lisinopril (PRINIVIL/ZESTRIL) 40 MG tablet   Multiple Vitamins-Minerals (MULTILEX-T&M) TABS   rosuvastatin (CRESTOR)  "20 MG tablet   sildenafil (REVATIO) 20 MG tablet   sildenafil (VIAGRA) 100 MG tablet   tamsulosin (FLOMAX) 0.4 MG capsule   triamcinolone (KENALOG) 0.1 % cream         Review of Systems   Musculoskeletal:        Stroke distal right index finger   All other systems reviewed and are negative.      Physical Exam   BP: 123/65  Heart Rate: 52  Temp: 97.3  F (36.3  C)  Resp: 16  Height: 172.7 cm (5' 8\")  Weight: 83 kg (183 lb)  SpO2: 96 %      Physical Exam   Musculoskeletal: Normal range of motion.   Small fishhook at the distal lateral aspect of the right index finger bleeding.  Some tenderness surrounding the point of insertion of the fishhook otherwise exam is unremarkable       ED Course     The area around the fishhook was cleaned with Betadine and rinsed off with sterile saline.  After infiltration locally of 1% lidocaine with 30-gauge needle the fishhook was removed.  Patient tolerated the procedure well.  Signs and symptoms of infection discussed with the patient for which he should follow-up if they develop with his primary care physician or return to ER.       ED Course     Procedures               Critical Care time:  none               No results found for this or any previous visit (from the past 24 hour(s)).    Medications   lidocaine 1 % injection 5 mL (not administered)       Assessments & Plan (with Medical Decision Making)     I have reviewed the nursing notes.    I have reviewed the findings, diagnosis, plan and need for follow up with the patient.       New Prescriptions    No medications on file       Final diagnoses:   Fish hook injury of finger of right hand, initial encounter       6/23/2018   [facility]     Marco A Catalan MD  06/23/18 1044    "

## 2018-07-10 DIAGNOSIS — E78.5 HYPERLIPIDEMIA, UNSPECIFIED HYPERLIPIDEMIA TYPE: ICD-10-CM

## 2018-07-10 DIAGNOSIS — I10 ESSENTIAL HYPERTENSION, BENIGN: Primary | ICD-10-CM

## 2018-07-12 RX ORDER — HYDROCHLOROTHIAZIDE 12.5 MG/1
TABLET ORAL
Qty: 90 TABLET | Refills: 0 | Status: SHIPPED | OUTPATIENT
Start: 2018-07-12 | End: 2018-07-31

## 2018-07-12 RX ORDER — LISINOPRIL 40 MG/1
TABLET ORAL
Qty: 90 TABLET | Refills: 0 | Status: SHIPPED | OUTPATIENT
Start: 2018-07-12 | End: 2018-07-31

## 2018-07-12 RX ORDER — ROSUVASTATIN CALCIUM 20 MG/1
TABLET, COATED ORAL
Qty: 90 TABLET | Refills: 0 | Status: SHIPPED | OUTPATIENT
Start: 2018-07-12 | End: 2018-07-31

## 2018-07-12 NOTE — TELEPHONE ENCOUNTER
LOV with PCP was on 3/21/18. All three Rxs as requested from pharmacy were noted in office visit notes on that date without change. Patient is to return for an annual office visit in August 2018 as per PCP and office visit is already scheduled for 7/31/18. Writer will refill Rxs as requested at this time.    Prescription refilled per RN Medication Refill Policy..................Eliu Breaux 7/12/2018 1:24 PM

## 2018-07-31 ENCOUNTER — OFFICE VISIT (OUTPATIENT)
Dept: FAMILY MEDICINE | Facility: OTHER | Age: 73
End: 2018-07-31
Attending: FAMILY MEDICINE
Payer: MEDICARE

## 2018-07-31 VITALS
HEART RATE: 58 BPM | DIASTOLIC BLOOD PRESSURE: 78 MMHG | SYSTOLIC BLOOD PRESSURE: 136 MMHG | BODY MASS INDEX: 29.35 KG/M2 | HEIGHT: 67 IN | WEIGHT: 187 LBS

## 2018-07-31 DIAGNOSIS — E78.5 HYPERLIPIDEMIA, UNSPECIFIED HYPERLIPIDEMIA TYPE: ICD-10-CM

## 2018-07-31 DIAGNOSIS — I10 ESSENTIAL HYPERTENSION, BENIGN: ICD-10-CM

## 2018-07-31 DIAGNOSIS — R97.20 ELEVATED PROSTATE SPECIFIC ANTIGEN (PSA): Primary | ICD-10-CM

## 2018-07-31 DIAGNOSIS — F41.1 GENERALIZED ANXIETY DISORDER: ICD-10-CM

## 2018-07-31 LAB
ALBUMIN SERPL-MCNC: 4.3 G/DL (ref 3.5–5.7)
ALP SERPL-CCNC: 39 U/L (ref 34–104)
ALT SERPL W P-5'-P-CCNC: 14 U/L (ref 7–52)
ANION GAP SERPL CALCULATED.3IONS-SCNC: 8 MMOL/L (ref 3–14)
AST SERPL W P-5'-P-CCNC: 26 U/L (ref 13–39)
BASOPHILS # BLD AUTO: 0.1 10E9/L (ref 0–0.2)
BASOPHILS NFR BLD AUTO: 1.1 %
BILIRUB SERPL-MCNC: 0.9 MG/DL (ref 0.3–1)
BUN SERPL-MCNC: 20 MG/DL (ref 7–25)
CALCIUM SERPL-MCNC: 9.8 MG/DL (ref 8.6–10.3)
CHLORIDE SERPL-SCNC: 103 MMOL/L (ref 98–107)
CHOLEST SERPL-MCNC: 193 MG/DL
CO2 SERPL-SCNC: 28 MMOL/L (ref 21–31)
CREAT SERPL-MCNC: 1.08 MG/DL (ref 0.7–1.3)
DIFFERENTIAL METHOD BLD: NORMAL
EOSINOPHIL # BLD AUTO: 0.1 10E9/L (ref 0–0.7)
EOSINOPHIL NFR BLD AUTO: 1.3 %
ERYTHROCYTE [DISTWIDTH] IN BLOOD BY AUTOMATED COUNT: 12.4 % (ref 10–15)
GFR SERPL CREATININE-BSD FRML MDRD: 67 ML/MIN/1.7M2
GLUCOSE SERPL-MCNC: 117 MG/DL (ref 70–105)
HCT VFR BLD AUTO: 42.6 % (ref 40–53)
HDLC SERPL-MCNC: 57 MG/DL (ref 23–92)
HGB BLD-MCNC: 14.7 G/DL (ref 13.3–17.7)
IMM GRANULOCYTES # BLD: 0 10E9/L (ref 0–0.4)
IMM GRANULOCYTES NFR BLD: 0.2 %
LDLC SERPL CALC-MCNC: 107 MG/DL
LYMPHOCYTES # BLD AUTO: 0.9 10E9/L (ref 0.8–5.3)
LYMPHOCYTES NFR BLD AUTO: 19.6 %
MCH RBC QN AUTO: 32 PG (ref 26.5–33)
MCHC RBC AUTO-ENTMCNC: 34.5 G/DL (ref 31.5–36.5)
MCV RBC AUTO: 93 FL (ref 78–100)
MONOCYTES # BLD AUTO: 0.4 10E9/L (ref 0–1.3)
MONOCYTES NFR BLD AUTO: 9.5 %
NEUTROPHILS # BLD AUTO: 3.2 10E9/L (ref 1.6–8.3)
NEUTROPHILS NFR BLD AUTO: 68.3 %
NONHDLC SERPL-MCNC: 136 MG/DL
PLATELET # BLD AUTO: 190 10E9/L (ref 150–450)
POTASSIUM SERPL-SCNC: 4 MMOL/L (ref 3.5–5.1)
PROT SERPL-MCNC: 7.1 G/DL (ref 6.4–8.9)
PSA SERPL-MCNC: 5.82 NG/ML
RBC # BLD AUTO: 4.6 10E12/L (ref 4.4–5.9)
SODIUM SERPL-SCNC: 139 MMOL/L (ref 134–144)
TRIGL SERPL-MCNC: 143 MG/DL
WBC # BLD AUTO: 4.6 10E9/L (ref 4–11)

## 2018-07-31 PROCEDURE — G0463 HOSPITAL OUTPT CLINIC VISIT: HCPCS

## 2018-07-31 PROCEDURE — 84153 ASSAY OF PSA TOTAL: CPT | Performed by: FAMILY MEDICINE

## 2018-07-31 PROCEDURE — 36415 COLL VENOUS BLD VENIPUNCTURE: CPT | Performed by: FAMILY MEDICINE

## 2018-07-31 PROCEDURE — 85025 COMPLETE CBC W/AUTO DIFF WBC: CPT | Performed by: FAMILY MEDICINE

## 2018-07-31 PROCEDURE — 99214 OFFICE O/P EST MOD 30 MIN: CPT | Performed by: FAMILY MEDICINE

## 2018-07-31 PROCEDURE — 80061 LIPID PANEL: CPT | Performed by: FAMILY MEDICINE

## 2018-07-31 PROCEDURE — 80053 COMPREHEN METABOLIC PANEL: CPT | Performed by: FAMILY MEDICINE

## 2018-07-31 RX ORDER — LISINOPRIL 40 MG/1
40 TABLET ORAL DAILY
Qty: 90 TABLET | Refills: 3 | Status: SHIPPED | OUTPATIENT
Start: 2018-07-31 | End: 2019-08-06

## 2018-07-31 RX ORDER — HYDROCHLOROTHIAZIDE 12.5 MG/1
12.5 TABLET ORAL DAILY
Qty: 90 TABLET | Refills: 3 | Status: SHIPPED | OUTPATIENT
Start: 2018-07-31 | End: 2019-08-06

## 2018-07-31 RX ORDER — ROSUVASTATIN CALCIUM 20 MG/1
20 TABLET, COATED ORAL AT BEDTIME
Qty: 90 TABLET | Refills: 3 | Status: SHIPPED | OUTPATIENT
Start: 2018-07-31 | End: 2019-08-06

## 2018-07-31 ASSESSMENT — ANXIETY QUESTIONNAIRES
6. BECOMING EASILY ANNOYED OR IRRITABLE: NOT AT ALL
3. WORRYING TOO MUCH ABOUT DIFFERENT THINGS: SEVERAL DAYS
2. NOT BEING ABLE TO STOP OR CONTROL WORRYING: NOT AT ALL
1. FEELING NERVOUS, ANXIOUS, OR ON EDGE: NOT AT ALL
7. FEELING AFRAID AS IF SOMETHING AWFUL MIGHT HAPPEN: NOT AT ALL
GAD7 TOTAL SCORE: 1
IF YOU CHECKED OFF ANY PROBLEMS ON THIS QUESTIONNAIRE, HOW DIFFICULT HAVE THESE PROBLEMS MADE IT FOR YOU TO DO YOUR WORK, TAKE CARE OF THINGS AT HOME, OR GET ALONG WITH OTHER PEOPLE: NOT DIFFICULT AT ALL
5. BEING SO RESTLESS THAT IT IS HARD TO SIT STILL: NOT AT ALL

## 2018-07-31 ASSESSMENT — PATIENT HEALTH QUESTIONNAIRE - PHQ9: 5. POOR APPETITE OR OVEREATING: NOT AT ALL

## 2018-07-31 ASSESSMENT — PAIN SCALES - GENERAL: PAINLEVEL: NO PAIN (0)

## 2018-07-31 NOTE — MR AVS SNAPSHOT
"              After Visit Summary   7/31/2018    Chao Kearney    MRN: [id number]           Patient Information     Date Of Birth          1945        Visit Information        Provider Department      7/31/2018 8:00 AM Chan Cook MD [facility]        Today's Diagnoses     Elevated prostate specific antigen (PSA)    -  1    Essential hypertension, benign        Hyperlipidemia, unspecified hyperlipidemia type        Generalized anxiety disorder           Follow-ups after your visit        Who to contact     If you have questions or need follow up information about today's clinic visit or your schedule please contact [facility] directly at [phone].  Normal or non-critical lab and imaging results will be communicated to you by [employer], letter or phone within 4 business days after the clinic has received the results. If you do not hear from us within 7 days, please contact the clinic through [employer] or phone. If you have a critical or abnormal lab result, we will notify you by phone as soon as possible.  Submit refill requests through [employer] or call your pharmacy and they will forward the refill request to us. Please allow 3 business days for your refill to be completed.          Additional Information About Your Visit        MyChart Information     [employer] gives you secure access to your electronic health record. If you see a primary care provider, you can also send messages to your care team and make appointments. If you have questions, please call your primary care clinic.  If you do not have a primary care provider, please call [phone] and they will assist you.        Care EveryWhere ID     This is your Care EveryWhere ID. This could be used by other organizations to access your [place] medical records  [id number]        Your Vitals Were     Pulse Height BMI (Body Mass Index)             58 5' 7\" (1.702 m) 29.29 kg/m2          Blood Pressure from " Last 3 Encounters:   07/31/18 136/78   06/23/18 123/65   03/21/18 122/66    Weight from Last 3 Encounters:   07/31/18 187 lb (84.8 kg)   06/23/18 183 lb (83 kg)   03/21/18 189 lb 3.2 oz (85.8 kg)              We Performed the Following     CBC with platelets differential     Comprehensive metabolic panel     Lipid Profile     Prostate Specific Antigen GH          Today's Medication Changes          These changes are accurate as of 7/31/18 10:43 AM.  If you have any questions, ask your nurse or doctor.               These medicines have changed or have updated prescriptions.        Dose/Directions    hydrochlorothiazide 12.5 MG Tabs tablet   This may have changed:  See the new instructions.   Used for:  Essential hypertension, benign   Changed by:  Chan Cook MD        Dose:  12.5 mg   Take 1 tablet (12.5 mg) by mouth daily   Quantity:  90 tablet   Refills:  3       lisinopril 40 MG tablet   Commonly known as:  PRINIVIL/ZESTRIL   This may have changed:  See the new instructions.   Used for:  Essential hypertension, benign   Changed by:  Chan Cook MD        Dose:  40 mg   Take 1 tablet (40 mg) by mouth daily   Quantity:  90 tablet   Refills:  3       rosuvastatin 20 MG tablet   Commonly known as:  CRESTOR   This may have changed:  See the new instructions.   Used for:  Hyperlipidemia, unspecified hyperlipidemia type   Changed by:  Chan Cook MD        Dose:  20 mg   Take 1 tablet (20 mg) by mouth At Bedtime   Quantity:  90 tablet   Refills:  3            Where to get your medicines      These medications were sent to [facility] - [facility] [address]  [address] [place] [address]     Phone:  [phone]     hydrochlorothiazide 12.5 MG Tabs tablet    lisinopril 40 MG tablet    rosuvastatin 20 MG tablet                Primary Care Provider Office Phone # Fax #    Chan Cook [phone] [address]  [place]  [address]        Equal Access to Services     [facility]: Hadii rosario mcclain samantha Gloria, waefrenda luqadaha, qaybta kalisaherberth khan, ted cartereliasluis toribio. [facility] [phone].    ATENCIÓN: Si habla español, tiene a diaz disposición servicios gratuitos de asistencia lingüística. Llame al [phone].    We comply with applicable federal civil rights laws and [place] laws. We do not discriminate on the basis of race, color, national origin, age, disability, sex, sexual orientation, or gender identity.            Thank you!     Thank you for choosing [facility] [place]  for your care. Our goal is always to provide you with excellent care. Hearing back from our patients is one way we can continue to improve our services. Please take a few minutes to complete the written survey that you may receive in the mail after your visit with us. Thank you!             Your Updated Medication List - Protect others around you: Learn how to safely use, store and throw away your medicines at www.disposemymeds.org.          This list is accurate as of 7/31/18 10:43 AM.  Always use your most recent med list.                   Brand Name Dispense Instructions for use Diagnosis    aspirin 81 MG EC tablet      Take 81 mg by mouth daily with food        hydrochlorothiazide 12.5 MG Tabs tablet     90 tablet    Take 1 tablet (12.5 mg) by mouth daily    Essential hypertension, benign       lisinopril 40 MG tablet    PRINIVIL/ZESTRIL    90 tablet    Take 1 tablet (40 mg) by mouth daily    Essential hypertension, benign       MULTILEX-T&M Tabs      Take 1 tablet by mouth daily        rosuvastatin 20 MG tablet    CRESTOR    90 tablet    Take 1 tablet (20 mg) by mouth At Bedtime    Hyperlipidemia, unspecified hyperlipidemia type       sildenafil 100 MG tablet    VIAGRA     Take 1 tablet by mouth as needed Take 30 min to 4 hours before sexual activity. Max 100mg/24hr        sildenafil 20 MG tablet    REVATIO      Take 2-3 tablets by mouth as needed        tamsulosin 0.4 MG capsule    FLOMAX     Take 2 capsules by mouth daily with food        triamcinolone 0.1 % cream    KENALOG          vitamin D3 1000 units Caps      Take 1 capsule by mouth daily

## 2018-07-31 NOTE — PROGRESS NOTES
Nursing Notes:   Mandy Mcfarlane LPN  2018  8:23 AM  Signed  He is here for medication refills.  Mandy Mcfarlane LPN..................2018   8:17 AM      SUBJECTIVE:  Chao Kearney  is a 72 year old male who comes in today for follow-up.  I last saw him in March.  He is due for annual labs to follow-up hyperlipidemia.  We have been following mildly elevated PSA.        Past Medical, Family, and Social History reviewed and updated as noted below.   ROS is negative except as noted above       No Known Allergies,   Family History   Problem Relation Age of Onset     HEART DISEASE Father 50     Heart Disease,heart failure     Other - See Comments Father       MS, long-standing     Cancer Mother 78     Cancer, lung cancer at age 78, developed brain metastases     Cancer Paternal Grandfather      Cancer, of cancer of unknown type     Other - See Comments Sister       MS   ,   Current Outpatient Prescriptions   Medication     aspirin EC 81 MG EC tablet     Cholecalciferol (VITAMIN D3) 1000 UNITS CAPS     hydrochlorothiazide 12.5 MG TABS tablet     lisinopril (PRINIVIL/ZESTRIL) 40 MG tablet     Multiple Vitamins-Minerals (MULTILEX-T&M) TABS     rosuvastatin (CRESTOR) 20 MG tablet     sildenafil (REVATIO) 20 MG tablet     sildenafil (VIAGRA) 100 MG tablet     tamsulosin (FLOMAX) 0.4 MG capsule     triamcinolone (KENALOG) 0.1 % cream     [DISCONTINUED] hydrochlorothiazide 12.5 MG TABS tablet     [DISCONTINUED] lisinopril (PRINIVIL/ZESTRIL) 40 MG tablet     [DISCONTINUED] rosuvastatin (CRESTOR) 20 MG tablet     No current facility-administered medications for this visit.    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 Marin 1/24/05     Nontraumatic subarachnoid hemorrhage (H)     No Comments Provided     Osteoarthritis of hip     No Comments Provided     Other seborrheic keratosis     No Comments Provided     Other specified disorders of bone, unspecified site (CODE)     1/05,Lingual sean, bony growth, lower mandible, evaluation Dr. Marin 1/05     Pain in right hip     No Comments Provided     Personal history of other medical treatment (CODE)     05/16/05,Q wave in 3 and AVF leads on electrocardiogram done     Plantar fascial fibromatosis     No Comments Provided     Retention of urine     No Comments Provided     Screening for other and unspecified cardiovascular conditions     05/20/2005,Stress echocardiogram is negative for ischemia with ejection fraction of 85%     Stiffness of unspecified joint, not elsewhere classified     No Comments Provided   ,   Patient Active Problem List    Diagnosis Date Noted     Generalized anxiety disorder 01/17/2018     Priority: Medium     Elevated prostate specific antigen (PSA) 01/17/2018     Priority: Medium     Overview:   in October 2005.  The patient is taking Levaquin daily for two months.  He   will have another PSA checked in October 2006, recommended by Dr. Wilson.       Hyperlipidemia 01/17/2018     Priority: Medium     Hypertension 01/17/2018     Priority: Medium     Plantar fasciitis 01/17/2018     Priority: Medium     Overview:   right       Benign non-nodular prostatic hyperplasia with lower urinary tract symptoms 07/27/2016     Priority: Medium     Brain injury (H) 01/16/2015      "Priority: Medium     Retention of urine 12/12/2014     Priority: Medium     Right hip pain 10/10/2014     Priority: Medium     Anemia 09/22/2014     Priority: Medium     Hydrocephalus 09/22/2014     Priority: Medium     SAH (subarachnoid hemorrhage) (H) 09/22/2014     Priority: Medium     Actinic keratosis 01/28/2014     Priority: Medium     Seborrheic keratosis 01/28/2014     Priority: Medium     H/O adenomatous polyp of colon 04/27/2010     Priority: Medium     Diverticulosis of sigmoid colon 04/26/2010     Priority: Medium   ,   Past Surgical History:   Procedure Laterality Date     COLONOSCOPY      (04/26/2010),F/U 2015     COLONOSCOPY      10/15/15,F/U 2025     EXTRACAPSULAR CATARACT EXTRATION WITH INTRAOCULAR LENS IMPLANT      2016     OTHER SURGICAL HISTORY      3/26/14,64066.0,LA TOTAL HIP ARTHROPLASTY,Left,direct anterior approach, Dr. Lama     OTHER SURGICAL HISTORY      2014,[id number],CRANIOTOMY     OTHER SURGICAL HISTORY      11/29/2016,,HERNIA REPAIR,Left,LIH with mesh    and   Social History   Substance Use Topics     Smoking status: Never Smoker     Smokeless tobacco: Never Used     Alcohol use 0.0 oz/week     OBJECTIVE:  /78 (BP Location: Right arm, Patient Position: Chair, Cuff Size: Adult Regular)  Pulse 58  Ht 5' 7\" (1.702 m)  Wt 187 lb (84.8 kg)  BMI 29.29 kg/m2   EXAM:  General Appearance: Pleasant, alert, appropriate appearance for age. No acute distress  Head Exam: Normal. Normocephalic, atraumatic.  Eye Exam:  Normal external eye, conjunctiva, lids, cornea. MINDY. EOMI  Ear Exam: Normal TM's bilaterally. Normal auditory canals and external ears. Non-tender.  Nose Exam: Normal external nose, mucus membranes, and septum.  OroPharynx Exam:  Dental hygiene adequate. Normal buccal mucosa. Normal pharynx.  Neck Exam:  Supple, no masses or nodes. No audible bruits  Thyroid Exam: No nodules or enlargement.  Chest/Respiratory Exam: Normal chest wall and respirations. Clear to " auscultation.  Cardiovascular Exam: Regular rate and rhythm. S1, S2, no murmur, click, gallop, or rubs.  Gastrointestinal Exam: Soft, non-tender, no masses or organomegaly.  Lymphatic Exam: Non-palpable nodes in neck, clavicular regions.  Musculoskeletal Exam: Back is straight and non-tender, full ROM of upper and lower extremities.  Foot Exam: Left and right foot: good pedal pulses  Skin: no rash or abnormalities  Neurologic Exam: Nonfocal, normal gross motor, tone coordination and no tremor.  Psychiatric Exam: Alert and oriented - appropriate affect.     Results for orders placed or performed in visit on 07/31/18   Prostate Specific Antigen GH   Result Value Ref Range    Prostate Specific Antigen 5.819 (H) <3.100 ng/mL   Lipid Profile   Result Value Ref Range    Cholesterol 193 <200 mg/dL    Triglycerides 143 <150 mg/dL    HDL Cholesterol 57 23 - 92 mg/dL    LDL Cholesterol Calculated 107 (H) <100 mg/dL    Non HDL Cholesterol 136 (H) <130 mg/dL   Comprehensive metabolic panel   Result Value Ref Range    Sodium 139 134 - 144 mmol/L    Potassium 4.0 3.5 - 5.1 mmol/L    Chloride 103 98 - 107 mmol/L    Carbon Dioxide 28 21 - 31 mmol/L    Anion Gap 8 3 - 14 mmol/L    Glucose 117 (H) 70 - 105 mg/dL    Urea Nitrogen 20 7 - 25 mg/dL    Creatinine 1.08 0.70 - 1.30 mg/dL    GFR Estimate 67 >60 mL/min/1.7m2    GFR Estimate If Black 81 >60 mL/min/1.7m2    Calcium 9.8 8.6 - 10.3 mg/dL    Bilirubin Total 0.9 0.3 - 1.0 mg/dL    Albumin 4.3 3.5 - 5.7 g/dL    Protein Total 7.1 6.4 - 8.9 g/dL    Alkaline Phosphatase 39 34 - 104 U/L    ALT 14 7 - 52 U/L    AST 26 13 - 39 U/L   CBC with platelets differential   Result Value Ref Range    WBC 4.6 4.0 - 11.0 10e9/L    RBC Count 4.60 4.4 - 5.9 10e12/L    Hemoglobin 14.7 13.3 - 17.7 g/dL    Hematocrit 42.6 40.0 - 53.0 %    MCV 93 78 - 100 fl    MCH 32.0 26.5 - 33.0 pg    MCHC 34.5 31.5 - 36.5 g/dL    RDW 12.4 10.0 - 15.0 %    Platelet Count 190 150 - 450 10e9/L    Diff Method Automated  Method     % Neutrophils 68.3 %    % Lymphocytes 19.6 %    % Monocytes 9.5 %    % Eosinophils 1.3 %    % Basophils 1.1 %    % Immature Granulocytes 0.2 %    Absolute Neutrophil 3.2 1.6 - 8.3 10e9/L    Absolute Lymphocytes 0.9 0.8 - 5.3 10e9/L    Absolute Monocytes 0.4 0.0 - 1.3 10e9/L    Absolute Eosinophils 0.1 0.0 - 0.7 10e9/L    Absolute Basophils 0.1 0.0 - 0.2 10e9/L    Abs Immature Granulocytes 0.0 0 - 0.4 10e9/L      ASSESSMENT/Plan :    Chao was seen today for recheck medication.    Diagnoses and all orders for this visit:    Elevated prostate specific antigen (PSA)  -     Prostate Specific Antigen GH; Future  -     Prostate Specific Antigen GH    Essential hypertension, benign  -     hydrochlorothiazide 12.5 MG TABS tablet; Take 1 tablet (12.5 mg) by mouth daily  -     lisinopril (PRINIVIL/ZESTRIL) 40 MG tablet; Take 1 tablet (40 mg) by mouth daily  -     Comprehensive metabolic panel; Future  -     CBC with platelets differential; Future  -     Comprehensive metabolic panel  -     CBC with platelets differential    Hyperlipidemia, unspecified hyperlipidemia type  -     rosuvastatin (CRESTOR) 20 MG tablet; Take 1 tablet (20 mg) by mouth At Bedtime  -     Lipid Profile; Future  -     Lipid Profile    Generalized anxiety disorder      Will notify of lab results when available. Discussed diet, exercise and healthy lifestyle changes. Continue current medications.     A total of 25 minutes was spent with the patient, greater than 50% of the time was spent in counseling/discussion of the aforementioned concerns.     Chan Cook MD

## 2018-08-01 ASSESSMENT — ANXIETY QUESTIONNAIRES: GAD7 TOTAL SCORE: 1

## 2018-08-20 ENCOUNTER — MYC MEDICAL ADVICE (OUTPATIENT)
Dept: FAMILY MEDICINE | Facility: OTHER | Age: 73
End: 2018-08-20

## 2018-08-21 ENCOUNTER — MYC MEDICAL ADVICE (OUTPATIENT)
Dept: FAMILY MEDICINE | Facility: OTHER | Age: 73
End: 2018-08-21

## 2018-09-05 ENCOUNTER — TELEPHONE (OUTPATIENT)
Dept: UROLOGY | Facility: OTHER | Age: 73
End: 2018-09-05

## 2018-09-10 NOTE — TELEPHONE ENCOUNTER
"PSA done 7/31/18 and note from Dr. Cook states:    \"Your labs are outstanding. Your PSA is essentially the same as last year which is very reassuring. I think we can recheck this again in a year:)\"    Pt.'s last office visit with Rene Baptiste MD was on 8/26/2016 and plan was to follow up with PSA January 2017.\"  To MD to address when and if patient needs a follow up.  Dorothea Cardenas RN.........9/10/2018...3:42 PM       "

## 2018-09-12 NOTE — TELEPHONE ENCOUNTER
Rene Baptiste MD aware that patient is fine with Chan Cook MD monitoring his PSA going forward.  Dorothea Cardenas RN......September 12, 2018...1:35 PM

## 2018-09-24 ENCOUNTER — MYC MEDICAL ADVICE (OUTPATIENT)
Dept: FAMILY MEDICINE | Facility: OTHER | Age: 73
End: 2018-09-24

## 2018-09-25 ENCOUNTER — MYC MEDICAL ADVICE (OUTPATIENT)
Dept: FAMILY MEDICINE | Facility: OTHER | Age: 73
End: 2018-09-25

## 2018-12-06 ENCOUNTER — MYC MEDICAL ADVICE (OUTPATIENT)
Dept: FAMILY MEDICINE | Facility: OTHER | Age: 73
End: 2018-12-06

## 2018-12-06 DIAGNOSIS — N52.9 ERECTILE DYSFUNCTION, UNSPECIFIED ERECTILE DYSFUNCTION TYPE: Primary | ICD-10-CM

## 2018-12-06 RX ORDER — SILDENAFIL 100 MG/1
TABLET, FILM COATED ORAL
Qty: 20 TABLET | Status: SHIPPED | OUTPATIENT
Start: 2018-12-06 | End: 2020-02-10

## 2018-12-19 ENCOUNTER — MYC MEDICAL ADVICE (OUTPATIENT)
Dept: FAMILY MEDICINE | Facility: OTHER | Age: 73
End: 2018-12-19

## 2018-12-19 DIAGNOSIS — N40.1 BENIGN NON-NODULAR PROSTATIC HYPERPLASIA WITH LOWER URINARY TRACT SYMPTOMS: Primary | ICD-10-CM

## 2018-12-19 RX ORDER — TAMSULOSIN HYDROCHLORIDE 0.4 MG/1
0.8 CAPSULE ORAL
Qty: 90 CAPSULE | Refills: 3 | Status: SHIPPED | OUTPATIENT
Start: 2018-12-19 | End: 2019-05-08

## 2019-05-08 DIAGNOSIS — N40.1 BENIGN NON-NODULAR PROSTATIC HYPERPLASIA WITH LOWER URINARY TRACT SYMPTOMS: ICD-10-CM

## 2019-05-09 RX ORDER — TAMSULOSIN HYDROCHLORIDE 0.4 MG/1
0.8 CAPSULE ORAL
Qty: 180 CAPSULE | Refills: 3 | Status: SHIPPED | OUTPATIENT
Start: 2019-05-09 | End: 2019-08-06

## 2019-05-09 NOTE — TELEPHONE ENCOUNTER
RN refill protocol fails for Rx as requested as follows:    Rerun Protocol (5/9/2019 4:26 PM)      Patient does not have Tadalafil, Vardenafil, or Sildenafil on their medication list        Chart review shows that LOV with PCP was on 7/31/18 for a med check. Rx as requested is noted in office visit notes on that date with no changes and no specific follow up timeline is noted as well. Writer will kathleen up and route Rx request to PCP for his consideration/approval.    Unable to complete prescription refill per RN Medication Refill Policy. Eliu Breaux 5/9/2019 4:29 PM

## 2019-06-13 ENCOUNTER — OFFICE VISIT (OUTPATIENT)
Dept: FAMILY MEDICINE | Facility: OTHER | Age: 74
End: 2019-06-13
Payer: COMMERCIAL

## 2019-06-13 ENCOUNTER — NURSE TRIAGE (OUTPATIENT)
Dept: FAMILY MEDICINE | Facility: OTHER | Age: 74
End: 2019-06-13

## 2019-06-13 VITALS
OXYGEN SATURATION: 95 % | RESPIRATION RATE: 16 BRPM | BODY MASS INDEX: 28.74 KG/M2 | TEMPERATURE: 97.3 F | HEIGHT: 67 IN | WEIGHT: 183.1 LBS | DIASTOLIC BLOOD PRESSURE: 61 MMHG | SYSTOLIC BLOOD PRESSURE: 116 MMHG | HEART RATE: 52 BPM

## 2019-06-13 DIAGNOSIS — S20.361A TICK BITE OF RIGHT SIDE OF CHEST WALL, INITIAL ENCOUNTER: Primary | ICD-10-CM
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
## 2019-06-13 PROCEDURE — 99213 OFFICE O/P EST LOW 20 MIN: CPT | Performed by: NURSE PRACTITIONER

## 2019-06-13 PROCEDURE — G0463 HOSPITAL OUTPT CLINIC VISIT: HCPCS

## 2019-06-13 ASSESSMENT — MIFFLIN-ST. JEOR: SCORE: 1534.17

## 2019-06-13 ASSESSMENT — PAIN SCALES - GENERAL: PAINLEVEL: NO PAIN (0)

## 2019-06-13 NOTE — NURSING NOTE
"Patient presents to clinic for tick bite. Patient is certain it was a deer tick. Wife states she tried to remove it but unsure if completely removed.   Chief Complaint   Patient presents with     Insect Bites       Initial /61 (BP Location: Left arm, Patient Position: Sitting, Cuff Size: Adult Large)   Pulse 52   Temp 97.3  F (36.3  C) (Tympanic)   Resp 16   Ht 1.702 m (5' 7\")   Wt 83.1 kg (183 lb 1.6 oz)   SpO2 95%   BMI 28.68 kg/m   Estimated body mass index is 28.68 kg/m  as calculated from the following:    Height as of this encounter: 1.702 m (5' 7\").    Weight as of this encounter: 83.1 kg (183 lb 1.6 oz).  Medication Reconciliation: complete    Maia Cardenas LPN    "
DISPLAY PLAN FREE TEXT

## 2019-06-13 NOTE — TELEPHONE ENCOUNTER
"Wife on behalf of patient answered triage questions. Wife verbalized understanding and intent to comply. 'We are on our way to [place] now. I have a neurology appointment. We will head into the  this afternoon when we get back\". Darling Bush RN on 6/13/2019 at 7:54 AM    Additional Information    Can't remove live tick (after using Care Advice)    Negative: Not a tick bite    Negative: Patient sounds very sick or weak to the triager    Negative: Fever or severe headache occurs, 2 to 14 days following the bite    Negative: Widespread rash occurs, 2 to 14 days following the bite    Answer Assessment - Initial Assessment Questions  1. TYPE of TICK: \"Is it a wood tick or a deer tick?\" If unsure, ask: \"What size was the tick?\" \"Did it look more like a watermelon seed or a poppy seed?\"       I think only the head is embedded. Definitely a deer tick   2. LOCATION: \"Where is the tick bite located?\"       Under right arm   3. ONSET: \"How long do you think the tick was attached before you removed it?\" (Hours or days)       2 days [place].   4. TETANUS: \"When was the last tetanus booster?\"         5. PREGNANCY: \"Is there any chance you are pregnant?\" \"When was your last menstrual period?\"      Na    Protocols used: TICK BITE-A-OH    "

## 2019-06-13 NOTE — PROGRESS NOTES
HPI:    Chao Kearney is a 73 year old male  who presents to clinic today for tick bite.    Found an embedded deer tick early this morning on his right upper chest.  Attempted to remove it but the head is still attached.  States he took one dose of his wife's Doxycycline 100 mg this morning.  He is requesting a full course of antibiotic treatment for lyme disease today.  He is asymptomatic, no fevers, chills, headaches, fatigue, or swollen joints, etc.  He is also requesting to have the remaining part of the tick removed.          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 Marin 1/24/05     Nontraumatic subarachnoid hemorrhage (H)     No Comments Provided     Osteoarthritis of hip     No Comments Provided     Other seborrheic keratosis     No Comments Provided     Other specified disorders of bone, unspecified site (CODE)     1/05,Lingual sean, bony growth, lower mandible, evaluation Dr. Marin 1/05     Pain in right hip     No Comments Provided     Personal history of other medical treatment (CODE)     05/16/05,Q wave in 3 and AVF leads on electrocardiogram done     Plantar fascial fibromatosis     No Comments Provided     Retention of urine     No Comments Provided     Screening for other and unspecified cardiovascular conditions      05/20/2005,Stress echocardiogram is negative for ischemia with ejection fraction of 85%     Stiffness of unspecified joint, not elsewhere classified     No Comments Provided     Past Surgical History:   Procedure Laterality Date     COLONOSCOPY  04/26/2010    (04/26/2010),F/U 2015     COLONOSCOPY  10/15/2015    10/15/15,F/U 2025     EXTRACAPSULAR CATARACT EXTRATION WITH INTRAOCULAR LENS IMPLANT      2016     OTHER SURGICAL HISTORY      3/26/14,04489.0,[place] TOTAL HIP ARTHROPLASTY,Left,direct anterior approach, Dr. Lama     OTHER SURGICAL HISTORY      2014,[id number],CRANIOTOMY     OTHER SURGICAL HISTORY      11/29/2016,,HERNIA REPAIR,Left,LIH with mesh     Social History     Tobacco Use     Smoking status: Former Smoker     Smokeless tobacco: Never Used   Substance Use Topics     Alcohol use: Yes     Alcohol/week: 0.0 oz     Current Outpatient Medications   Medication Sig Dispense Refill     aspirin EC 81 MG EC tablet Take 81 mg by mouth daily with food       Cholecalciferol (VITAMIN D3) 1000 UNITS CAPS Take 1 capsule by mouth daily       hydrochlorothiazide 12.5 MG TABS tablet Take 1 tablet (12.5 mg) by mouth daily 90 tablet 3     lisinopril (PRINIVIL/ZESTRIL) 40 MG tablet Take 1 tablet (40 mg) by mouth daily 90 tablet 3     Multiple Vitamins-Minerals (MULTILEX-T&M) TABS Take 1 tablet by mouth daily       rosuvastatin (CRESTOR) 20 MG tablet Take 1 tablet (20 mg) by mouth At Bedtime 90 tablet 3     sildenafil (VIAGRA) 100 MG tablet Take 1 tablet by mouth as needed Take 30 min to 4 hours before sexual activity. Max 100mg/24hr 20 tablet prn     tamsulosin (FLOMAX) 0.4 MG capsule TAKE 2 CAPSULES (0.8 MG) BY MOUTH DAILY WITH FOOD 180 capsule 3     triamcinolone (KENALOG) 0.1 % cream        No Known Allergies      Past medical history, past surgical history, current medications and allergies reviewed and accurate to the best of my knowledge.        ROS:  Refer to HPI    /61 (BP Location: Left arm, Patient  "Position: Sitting, Cuff Size: Adult Large)   Pulse 52   Temp 97.3  F (36.3  C) (Tympanic)   Resp 16   Ht 1.702 m (5' 7\")   Wt 83.1 kg (183 lb 1.6 oz)   SpO2 95%   BMI 28.68 kg/m      EXAM:  General Appearance: Well appearing elderly male, appropriate appearance for age. No acute distress  Eyes: conjunctivae normal without erythema or irritation, no drainage or crusting, no eyelid swelling, pupils equal   Orophayrnx: voice clear  Respiratory: Normal effort.  No cough appreciated, oxygen saturation 95%  Musculoskeletal:  Normal gait.  Equal movement of bilateral upper extremities.  Equal movement of bilateral lower extremities.    Dermatological: right upper chest with single remaining deer tick attached with surrounding erythema at site consistent with location reaction, tick head removed with tweezers.    Psychological: normal affect, alert and pleasant      Labs:  Not indicated at this time.             ASSESSMENT/PLAN:  1. Tick bite of right side of chest wall, initial encounter    Discussed with patient and spouse tick education including CDC guidelines for prophylactic treatment vs treatment of lyme disease.    Discussed importance of daily tick checks and prevention    Doxycycline 200 mg one time for prophylactic dosing - patient already took 100 mg this morning and will take an additional 100 mg when he returns home today for total of 200 mg today only.    No lab work needed today as it is too soon for accurate results     Discussed warning signs/symptoms indicative of need to f/u    Follow up if symptoms persist or worsen or concerns        "

## 2019-07-05 ENCOUNTER — MYC MEDICAL ADVICE (OUTPATIENT)
Dept: FAMILY MEDICINE | Facility: OTHER | Age: 74
End: 2019-07-05

## 2019-07-05 DIAGNOSIS — L30.9 ECZEMA, UNSPECIFIED TYPE: Primary | ICD-10-CM

## 2019-07-05 RX ORDER — TRIAMCINOLONE ACETONIDE 1 MG/G
CREAM TOPICAL
Qty: 30 G | Refills: 1 | Status: SHIPPED | OUTPATIENT
Start: 2019-07-05

## 2019-07-05 NOTE — TELEPHONE ENCOUNTER
Patient sent [employer] message, requesting refill of Triamcinolone Acetonide cream USP, 0.1%, to be sent to [facility] pharmacy.     Pt noted that he is ALMOST OUT AND WILL BE GONE FOR 5 DAYS STARTING Monday June 8TH AND WOULD LIKE FILLED TODAY.    triamcinolone (KENALOG) 0.1 % external cream  Sig: Apply topically to affected area(s) 3 times daily.  Last Prescription Date:   5/19/17  Last Fill Qty/Refills:         30G, R-3    Last Office Visit:              7/31/18  Future Office visit:           None.  Routing refill request to provider for review/approval because:  Last ordered in 2017.    In clinical absence of patient's primary, Chan Cook, patient is requesting that this message be sent to the Doc of the Day for consideration please.  JVC scheduled to return to clinic on Tuesday 7/9.    Unable to complete prescription refill per RN Medication Refill Policy. Martine Bowie RN .............. 7/5/2019  12:46 PM

## 2019-08-06 ENCOUNTER — OFFICE VISIT (OUTPATIENT)
Dept: FAMILY MEDICINE | Facility: OTHER | Age: 74
End: 2019-08-06
Attending: FAMILY MEDICINE
Payer: MEDICARE

## 2019-08-06 VITALS
BODY MASS INDEX: 29.13 KG/M2 | HEART RATE: 69 BPM | DIASTOLIC BLOOD PRESSURE: 80 MMHG | RESPIRATION RATE: 18 BRPM | SYSTOLIC BLOOD PRESSURE: 130 MMHG | OXYGEN SATURATION: 97 % | WEIGHT: 186 LBS | TEMPERATURE: 97.4 F

## 2019-08-06 DIAGNOSIS — S06.9X9S BRAIN INJURY WITH LOSS OF CONSCIOUSNESS, SEQUELA (H): ICD-10-CM

## 2019-08-06 DIAGNOSIS — I10 ESSENTIAL HYPERTENSION, BENIGN: ICD-10-CM

## 2019-08-06 DIAGNOSIS — L82.1 SEBORRHEIC KERATOSIS: ICD-10-CM

## 2019-08-06 DIAGNOSIS — I10 ESSENTIAL HYPERTENSION: Primary | ICD-10-CM

## 2019-08-06 DIAGNOSIS — E78.2 MIXED HYPERLIPIDEMIA: ICD-10-CM

## 2019-08-06 DIAGNOSIS — R97.20 ELEVATED PROSTATE SPECIFIC ANTIGEN (PSA): ICD-10-CM

## 2019-08-06 DIAGNOSIS — F41.1 GENERALIZED ANXIETY DISORDER: ICD-10-CM

## 2019-08-06 DIAGNOSIS — N42.9 PROSTATE DISORDER: ICD-10-CM

## 2019-08-06 DIAGNOSIS — N40.1 BENIGN NON-NODULAR PROSTATIC HYPERPLASIA WITH LOWER URINARY TRACT SYMPTOMS: ICD-10-CM

## 2019-08-06 DIAGNOSIS — E78.5 HYPERLIPIDEMIA, UNSPECIFIED HYPERLIPIDEMIA TYPE: ICD-10-CM

## 2019-08-06 LAB
ALBUMIN SERPL-MCNC: 4.7 G/DL (ref 3.5–5.7)
ALP SERPL-CCNC: 47 U/L (ref 34–104)
ALT SERPL W P-5'-P-CCNC: 13 U/L (ref 7–52)
ANION GAP SERPL CALCULATED.3IONS-SCNC: 7 MMOL/L (ref 3–14)
AST SERPL W P-5'-P-CCNC: 24 U/L (ref 13–39)
BASOPHILS # BLD AUTO: 0 10E9/L (ref 0–0.2)
BASOPHILS NFR BLD AUTO: 0.6 %
BILIRUB SERPL-MCNC: 0.9 MG/DL (ref 0.3–1)
BUN SERPL-MCNC: 16 MG/DL (ref 7–25)
CALCIUM SERPL-MCNC: 9.9 MG/DL (ref 8.6–10.3)
CHLORIDE SERPL-SCNC: 101 MMOL/L (ref 98–107)
CHOLEST SERPL-MCNC: 183 MG/DL
CO2 SERPL-SCNC: 29 MMOL/L (ref 21–31)
CREAT SERPL-MCNC: 1.1 MG/DL (ref 0.7–1.3)
DIFFERENTIAL METHOD BLD: NORMAL
EOSINOPHIL # BLD AUTO: 0.1 10E9/L (ref 0–0.7)
EOSINOPHIL NFR BLD AUTO: 1.2 %
ERYTHROCYTE [DISTWIDTH] IN BLOOD BY AUTOMATED COUNT: 12.6 % (ref 10–15)
GFR SERPL CREATININE-BSD FRML MDRD: 66 ML/MIN/{1.73_M2}
GLUCOSE SERPL-MCNC: 101 MG/DL (ref 70–105)
HCT VFR BLD AUTO: 44.6 % (ref 40–53)
HDLC SERPL-MCNC: 62 MG/DL (ref 23–92)
HGB BLD-MCNC: 15.2 G/DL (ref 13.3–17.7)
IMM GRANULOCYTES # BLD: 0 10E9/L (ref 0–0.4)
IMM GRANULOCYTES NFR BLD: 0.2 %
LDLC SERPL CALC-MCNC: 98 MG/DL
LYMPHOCYTES # BLD AUTO: 0.9 10E9/L (ref 0.8–5.3)
LYMPHOCYTES NFR BLD AUTO: 17.1 %
MCH RBC QN AUTO: 31.5 PG (ref 26.5–33)
MCHC RBC AUTO-ENTMCNC: 34.1 G/DL (ref 31.5–36.5)
MCV RBC AUTO: 92 FL (ref 78–100)
MONOCYTES # BLD AUTO: 0.6 10E9/L (ref 0–1.3)
MONOCYTES NFR BLD AUTO: 11.2 %
NEUTROPHILS # BLD AUTO: 3.6 10E9/L (ref 1.6–8.3)
NEUTROPHILS NFR BLD AUTO: 69.7 %
NONHDLC SERPL-MCNC: 121 MG/DL
PLATELET # BLD AUTO: 175 10E9/L (ref 150–450)
POTASSIUM SERPL-SCNC: 4 MMOL/L (ref 3.5–5.1)
PROT SERPL-MCNC: 7.3 G/DL (ref 6.4–8.9)
PSA SERPL-MCNC: 7.08 NG/ML
RBC # BLD AUTO: 4.83 10E12/L (ref 4.4–5.9)
SODIUM SERPL-SCNC: 137 MMOL/L (ref 134–144)
TRIGL SERPL-MCNC: 116 MG/DL
WBC # BLD AUTO: 5.2 10E9/L (ref 4–11)

## 2019-08-06 PROCEDURE — 36415 COLL VENOUS BLD VENIPUNCTURE: CPT | Mod: ZL | Performed by: FAMILY MEDICINE

## 2019-08-06 PROCEDURE — 80053 COMPREHEN METABOLIC PANEL: CPT | Mod: ZL | Performed by: FAMILY MEDICINE

## 2019-08-06 PROCEDURE — 99214 OFFICE O/P EST MOD 30 MIN: CPT | Performed by: FAMILY MEDICINE

## 2019-08-06 PROCEDURE — 80061 LIPID PANEL: CPT | Mod: ZL | Performed by: FAMILY MEDICINE

## 2019-08-06 PROCEDURE — 85025 COMPLETE CBC W/AUTO DIFF WBC: CPT | Mod: ZL | Performed by: FAMILY MEDICINE

## 2019-08-06 PROCEDURE — G0463 HOSPITAL OUTPT CLINIC VISIT: HCPCS

## 2019-08-06 PROCEDURE — 84153 ASSAY OF PSA TOTAL: CPT | Mod: ZL | Performed by: FAMILY MEDICINE

## 2019-08-06 RX ORDER — ROSUVASTATIN CALCIUM 20 MG/1
20 TABLET, COATED ORAL AT BEDTIME
Qty: 90 TABLET | Refills: 3 | Status: SHIPPED | OUTPATIENT
Start: 2019-08-06 | End: 2019-09-17

## 2019-08-06 RX ORDER — LISINOPRIL 40 MG/1
40 TABLET ORAL DAILY
Qty: 90 TABLET | Refills: 3 | Status: SHIPPED | OUTPATIENT
Start: 2019-08-06 | End: 2019-09-17

## 2019-08-06 RX ORDER — TAMSULOSIN HYDROCHLORIDE 0.4 MG/1
0.8 CAPSULE ORAL
Qty: 180 CAPSULE | Refills: 3 | Status: SHIPPED | OUTPATIENT
Start: 2019-08-06 | End: 2020-08-03

## 2019-08-06 RX ORDER — HYDROCHLOROTHIAZIDE 12.5 MG/1
12.5 TABLET ORAL DAILY
Qty: 90 TABLET | Refills: 3 | Status: SHIPPED | OUTPATIENT
Start: 2019-08-06 | End: 2019-09-17

## 2019-08-06 ASSESSMENT — PAIN SCALES - GENERAL: PAINLEVEL: NO PAIN (0)

## 2019-08-06 NOTE — PROGRESS NOTES
"Nursing Notes:   Anne Sanchez LPN  2019  9:25 AM  Signed  Chief Complaint   Patient presents with     Mole     Form Request     Patient presents to the clinic today for assessment of a mole on his forehead, and for medication check.     Anne Sanchez LPN on 2019 at 9:04 AM      Initial /80 (BP Location: Right arm, Patient Position: Sitting, Cuff Size: Adult Regular)   Pulse 69   Temp 97.4  F (36.3  C) (Tympanic)   Resp 18   Wt 84.4 kg (186 lb)   SpO2 97%   BMI 29.13 kg/m    Estimated body mass index is 29.13 kg/m  as calculated from the following:    Height as of 19: 1.702 m (5' 7\").    Weight as of this encounter: 84.4 kg (186 lb).  Medication Reconciliation: complete    Anne Sanchez LPN      SUBJECTIVE:  Chao Kearney  is a 73 year old male who comes in today for evaluation of a mole on his forehead.  I last saw him about a year ago.  He has hyperlipidemia and a mildly elevated PSA which we have been following.  He continues on Crestor, hydrochlorothiazide, lisinopril.  His last labs were a year ago.    He is up-to-date on immunizations.  He is up-to-date on health maintenance issues.    He plays [employer] bridge every week.  He is a homebody. His wife has meningiomas and has a controlled seizure disorder. She likes to travel and does some.         Past Medical, Family, and Social History reviewed and updated as noted below.   ROS is negative except as noted above       No Known Allergies,   Family History   Problem Relation Age of Onset     Heart Disease Father 50        Heart Disease,heart failure     Other - See Comments Father          MS, long-standing     Cancer Mother 78        Cancer, lung cancer at age 78, developed brain metastases     Cancer Paternal Grandfather         Cancer, of cancer of unknown type     Other - See Comments Sister          MS   ,   Current Outpatient Medications   Medication     aspirin EC 81 MG EC tablet     Cholecalciferol (VITAMIN D3) 1000 UNITS " CAPS     hydrochlorothiazide (HYDRODIURIL) 12.5 MG tablet     lisinopril (PRINIVIL/ZESTRIL) 40 MG tablet     Multiple Vitamins-Minerals (MULTILEX-T&M) TABS     rosuvastatin (CRESTOR) 20 MG tablet     sildenafil (VIAGRA) 100 MG tablet     tamsulosin (FLOMAX) 0.4 MG capsule     triamcinolone (KENALOG) 0.1 % external cream     No current facility-administered medications for this visit.    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 Marin 1/24/05     Nontraumatic subarachnoid hemorrhage (H)     No Comments Provided     Osteoarthritis of hip     No Comments Provided     Other seborrheic keratosis     No Comments Provided     Other specified disorders of bone, unspecified site (CODE)     1/05,Lingual sean, bony growth, lower mandible, evaluation Dr. Marin 1/05     Pain in right hip     No Comments Provided     Personal history of other medical treatment (CODE)     05/16/05,Q wave in 3 and AVF leads on electrocardiogram done     Plantar fascial fibromatosis     No Comments Provided     Retention of urine     No Comments Provided     Screening for other and unspecified cardiovascular conditions     05/20/2005,Stress echocardiogram is negative for ischemia with ejection fraction of 85%     Stiffness of unspecified joint, not elsewhere  classified     No Comments Provided   ,   Patient Active Problem List    Diagnosis Date Noted     Generalized anxiety disorder 01/17/2018     Priority: Medium     Elevated prostate specific antigen (PSA) 01/17/2018     Priority: Medium     Overview:   in October 2005.  The patient is taking Levaquin daily for two months.  He   will have another PSA checked in October 2006, recommended by Dr. Wilson.       Hyperlipidemia 01/17/2018     Priority: Medium     Hypertension 01/17/2018     Priority: Medium     Plantar fasciitis 01/17/2018     Priority: Medium     Overview:   right       Benign non-nodular prostatic hyperplasia with lower urinary tract symptoms 07/27/2016     Priority: Medium     Brain injury (H) 01/16/2015     Priority: Medium     Retention of urine 12/12/2014     Priority: Medium     Right hip pain 10/10/2014     Priority: Medium     Anemia 09/22/2014     Priority: Medium     Hydrocephalus 09/22/2014     Priority: Medium     SAH (subarachnoid hemorrhage) (H) 09/22/2014     Priority: Medium     Actinic keratosis 01/28/2014     Priority: Medium     Seborrheic keratosis 01/28/2014     Priority: Medium     H/O adenomatous polyp of colon 04/27/2010     Priority: Medium     Diverticulosis of sigmoid colon 04/26/2010     Priority: Medium   ,   Past Surgical History:   Procedure Laterality Date     COLONOSCOPY  04/26/2010    (04/26/2010),F/U 2015     COLONOSCOPY  10/15/2015    10/15/15,F/U 2025     EXTRACAPSULAR CATARACT EXTRATION WITH INTRAOCULAR LENS IMPLANT      2016     OTHER SURGICAL HISTORY      3/26/14,87478.0,[place] TOTAL HIP ARTHROPLASTY,Left,direct anterior approach, Dr. Lama     OTHER SURGICAL HISTORY      2014,[id number],CRANIOTOMY     OTHER SURGICAL HISTORY      11/29/2016,,HERNIA REPAIR,Left,LIH with mesh    and   Social History     Tobacco Use     Smoking status: Former Smoker     Smokeless tobacco: Never Used   Substance Use Topics     Alcohol use: Yes     Alcohol/week: 0.0 oz     OBJECTIVE:  BP  130/80 (BP Location: Right arm, Patient Position: Sitting, Cuff Size: Adult Regular)   Pulse 69   Temp 97.4  F (36.3  C) (Tympanic)   Resp 18   Wt 84.4 kg (186 lb)   SpO2 97%   BMI 29.13 kg/m     EXAM:  General Appearance: Pleasant, alert, appropriate appearance for age. No acute distress  Head Exam: Normal. Normocephalic, atraumatic.  Eye Exam:  Normal external eyes, conjunctivae, lids, cornea. MINDY. EOMI  Ear Exam: Normal TM's bilaterally. Normal auditory canals and external ears. Non-tender.  Nose Exam: Normal external nose, mucus membranes, and septum.  OroPharynx Exam:  Dental hygiene adequate. Normal buccal mucosa. Normal pharynx.  Neck Exam:  Supple, no masses or nodes. No audible bruits  Thyroid Exam: No nodules or enlargement.  Chest/Respiratory Exam: Normal chest wall and respirations. Clear to auscultation.  Cardiovascular Exam: Regular rate and rhythm. S1, S2, no murmur, click, gallop, or rubs.  Gastrointestinal Exam: Soft, non-tender, no masses or organomegaly.  Lymphatic Exam: Non-palpable nodes in neck, clavicular regions.  Musculoskeletal Exam: Back is straight and non-tender, full ROM of upper and lower extremities.  Foot Exam: Left and right foot: good pedal pulses  Skin: no rash or abnormalities.  He has some scattered seborrheic keratoses but nothing worrisome  Neurologic Exam: Nonfocal, normal gross motor, tone coordination and no tremor.  Psychiatric Exam: Alert and oriented - appropriate affect.     Results for orders placed or performed in visit on 08/06/19   Prostate Specific Antigen GH   Result Value Ref Range    Prostate Specific Antigen 7.075 (H) <3.100 ng/mL   Lipid Profile   Result Value Ref Range    Cholesterol 183 <200 mg/dL    Triglycerides 116 <150 mg/dL    HDL Cholesterol 62 23 - 92 mg/dL    LDL Cholesterol Calculated 98 <100 mg/dL    Non HDL Cholesterol 121 <130 mg/dL   Comprehensive metabolic panel   Result Value Ref Range    Sodium 137 134 - 144 mmol/L    Potassium 4.0 3.5  - 5.1 mmol/L    Chloride 101 98 - 107 mmol/L    Carbon Dioxide 29 21 - 31 mmol/L    Anion Gap 7 3 - 14 mmol/L    Glucose 101 70 - 105 mg/dL    Urea Nitrogen 16 7 - 25 mg/dL    Creatinine 1.10 0.70 - 1.30 mg/dL    GFR Estimate 66 >60 mL/min/[1.73_m2]    GFR Estimate If Black 79 >60 mL/min/[1.73_m2]    Calcium 9.9 8.6 - 10.3 mg/dL    Bilirubin Total 0.9 0.3 - 1.0 mg/dL    Albumin 4.7 3.5 - 5.7 g/dL    Protein Total 7.3 6.4 - 8.9 g/dL    Alkaline Phosphatase 47 34 - 104 U/L    ALT 13 7 - 52 U/L    AST 24 13 - 39 U/L   CBC with platelets differential   Result Value Ref Range    WBC 5.2 4.0 - 11.0 10e9/L    RBC Count 4.83 4.4 - 5.9 10e12/L    Hemoglobin 15.2 13.3 - 17.7 g/dL    Hematocrit 44.6 40.0 - 53.0 %    MCV 92 78 - 100 fl    MCH 31.5 26.5 - 33.0 pg    MCHC 34.1 31.5 - 36.5 g/dL    RDW 12.6 10.0 - 15.0 %    Platelet Count 175 150 - 450 10e9/L    Diff Method Automated Method     % Neutrophils 69.7 %    % Lymphocytes 17.1 %    % Monocytes 11.2 %    % Eosinophils 1.2 %    % Basophils 0.6 %    % Immature Granulocytes 0.2 %    Absolute Neutrophil 3.6 1.6 - 8.3 10e9/L    Absolute Lymphocytes 0.9 0.8 - 5.3 10e9/L    Absolute Monocytes 0.6 0.0 - 1.3 10e9/L    Absolute Eosinophils 0.1 0.0 - 0.7 10e9/L    Absolute Basophils 0.0 0.0 - 0.2 10e9/L    Abs Immature Granulocytes 0.0 0 - 0.4 10e9/L      ASSESSMENT/Plan :    Chao was seen today for mole and form request.    Diagnoses and all orders for this visit:    Essential hypertension  -     CBC with platelets differential; Future  -     Comprehensive metabolic panel; Future  -     Comprehensive metabolic panel  -     CBC with platelets differential    Elevated prostate specific antigen (PSA)  -     Prostate Specific Antigen GH; Future  -     Prostate Specific Antigen GH    Generalized anxiety disorder    Brain injury with loss of consciousness, sequela (H)  -     CBC with platelets differential; Future  -     CBC with platelets differential    Mixed hyperlipidemia  -      Comprehensive metabolic panel; Future  -     Lipid Profile; Future  -     Lipid Profile  -     Comprehensive metabolic panel    Prostate disorder  -     Prostate Specific Antigen GH; Future  -     Prostate Specific Antigen GH    Essential hypertension, benign  -     hydrochlorothiazide (HYDRODIURIL) 12.5 MG tablet; Take 1 tablet (12.5 mg) by mouth daily  -     lisinopril (PRINIVIL/ZESTRIL) 40 MG tablet; Take 1 tablet (40 mg) by mouth daily    Hyperlipidemia, unspecified hyperlipidemia type  -     rosuvastatin (CRESTOR) 20 MG tablet; Take 1 tablet (20 mg) by mouth At Bedtime    Benign non-nodular prostatic hyperplasia with lower urinary tract symptoms  -     tamsulosin (FLOMAX) 0.4 MG capsule; Take 2 capsules (0.8 mg) by mouth daily with food    Seborrheic keratosis    Reassured regarding his seborrheic keratoses.    Will notify of lab results when available. Discussed diet, exercise and healthy lifestyle changes. Continue current medications.     PSA has risen but is still less than it was prior to his biopsy in 2016.  We will plan to repeat this again in 6 to 12 months.      A total of 25 minutes was spent with the patient, greater than 50% of the time was spent in counseling/discussion of the aforementioned concerns.     Chan Cook MD

## 2019-08-06 NOTE — NURSING NOTE
"Chief Complaint   Patient presents with     Mole     Form Request     Patient presents to the clinic today for assessment of a mole on his forehead, and for medication check.     Anne Sanchez LPN on 8/6/2019 at 9:04 AM      Initial /80 (BP Location: Right arm, Patient Position: Sitting, Cuff Size: Adult Regular)   Pulse 69   Temp 97.4  F (36.3  C) (Tympanic)   Resp 18   Wt 84.4 kg (186 lb)   SpO2 97%   BMI 29.13 kg/m   Estimated body mass index is 29.13 kg/m  as calculated from the following:    Height as of 6/13/19: 1.702 m (5' 7\").    Weight as of this encounter: 84.4 kg (186 lb).  Medication Reconciliation: complete    Anne Sanchez LPN  "

## 2019-08-07 ENCOUNTER — HOSPITAL ENCOUNTER (EMERGENCY)
Facility: OTHER | Age: 74
Discharge: HOME | End: 2019-08-08
Attending: FAMILY MEDICINE | Admitting: FAMILY MEDICINE
Payer: MEDICARE

## 2019-08-07 ENCOUNTER — APPOINTMENT (OUTPATIENT)
Dept: GENERAL RADIOLOGY | Facility: OTHER | Age: 74
End: 2019-08-07
Attending: FAMILY MEDICINE
Payer: MEDICARE

## 2019-08-07 ENCOUNTER — APPOINTMENT (OUTPATIENT)
Dept: CT IMAGING | Facility: OTHER | Age: 74
End: 2019-08-07
Attending: FAMILY MEDICINE
Payer: MEDICARE

## 2019-08-07 DIAGNOSIS — R42 DIZZINESS: ICD-10-CM

## 2019-08-07 DIAGNOSIS — M62.81 GENERALIZED MUSCLE WEAKNESS: ICD-10-CM

## 2019-08-07 LAB
ALBUMIN SERPL-MCNC: 4.3 G/DL (ref 3.5–5.7)
ALP SERPL-CCNC: 42 U/L (ref 34–104)
ALT SERPL W P-5'-P-CCNC: 14 U/L (ref 7–52)
ANION GAP SERPL CALCULATED.3IONS-SCNC: 7 MMOL/L (ref 3–14)
APTT PPP: 30 SEC (ref 22–37)
AST SERPL W P-5'-P-CCNC: 23 U/L (ref 13–39)
BASOPHILS # BLD AUTO: 0 10E9/L (ref 0–0.2)
BASOPHILS NFR BLD AUTO: 0.7 %
BILIRUB SERPL-MCNC: 0.7 MG/DL (ref 0.3–1)
BUN SERPL-MCNC: 19 MG/DL (ref 7–25)
CALCIUM SERPL-MCNC: 9.2 MG/DL (ref 8.6–10.3)
CHLORIDE SERPL-SCNC: 101 MMOL/L (ref 98–107)
CO2 SERPL-SCNC: 29 MMOL/L (ref 21–31)
CREAT SERPL-MCNC: 1.18 MG/DL (ref 0.7–1.3)
DIFFERENTIAL METHOD BLD: NORMAL
EOSINOPHIL # BLD AUTO: 0.1 10E9/L (ref 0–0.7)
EOSINOPHIL NFR BLD AUTO: 2.3 %
ERYTHROCYTE [DISTWIDTH] IN BLOOD BY AUTOMATED COUNT: 12.5 % (ref 10–15)
GFR SERPL CREATININE-BSD FRML MDRD: 61 ML/MIN/{1.73_M2}
GLUCOSE SERPL-MCNC: 114 MG/DL (ref 70–105)
HCT VFR BLD AUTO: 41.1 % (ref 40–53)
HGB BLD-MCNC: 14.2 G/DL (ref 13.3–17.7)
IMM GRANULOCYTES # BLD: 0 10E9/L (ref 0–0.4)
IMM GRANULOCYTES NFR BLD: 0.2 %
INR PPP: 0.95 (ref 0–1.3)
LYMPHOCYTES # BLD AUTO: 1.4 10E9/L (ref 0.8–5.3)
LYMPHOCYTES NFR BLD AUTO: 25.8 %
MAGNESIUM SERPL-MCNC: 2.3 MG/DL (ref 1.9–2.7)
MCH RBC QN AUTO: 32.1 PG (ref 26.5–33)
MCHC RBC AUTO-ENTMCNC: 34.5 G/DL (ref 31.5–36.5)
MCV RBC AUTO: 93 FL (ref 78–100)
MONOCYTES # BLD AUTO: 0.8 10E9/L (ref 0–1.3)
MONOCYTES NFR BLD AUTO: 13.5 %
NEUTROPHILS # BLD AUTO: 3.2 10E9/L (ref 1.6–8.3)
NEUTROPHILS NFR BLD AUTO: 57.5 %
NT-PROBNP SERPL-MCNC: 13 PG/ML (ref 0–100)
PLATELET # BLD AUTO: 169 10E9/L (ref 150–450)
POTASSIUM SERPL-SCNC: 3.6 MMOL/L (ref 3.5–5.1)
PROT SERPL-MCNC: 6.7 G/DL (ref 6.4–8.9)
RBC # BLD AUTO: 4.43 10E12/L (ref 4.4–5.9)
SODIUM SERPL-SCNC: 137 MMOL/L (ref 134–144)
TROPONIN I SERPL-MCNC: <0.03 UG/L (ref 0–0.03)
WBC # BLD AUTO: 5.6 10E9/L (ref 4–11)

## 2019-08-07 PROCEDURE — 99285 EMERGENCY DEPT VISIT HI MDM: CPT | Mod: 25 | Performed by: FAMILY MEDICINE

## 2019-08-07 PROCEDURE — 83735 ASSAY OF MAGNESIUM: CPT | Performed by: FAMILY MEDICINE

## 2019-08-07 PROCEDURE — 83880 ASSAY OF NATRIURETIC PEPTIDE: CPT | Mod: GZ | Performed by: FAMILY MEDICINE

## 2019-08-07 PROCEDURE — 81001 URINALYSIS AUTO W/SCOPE: CPT | Mod: XU | Performed by: FAMILY MEDICINE

## 2019-08-07 PROCEDURE — 71045 X-RAY EXAM CHEST 1 VIEW: CPT

## 2019-08-07 PROCEDURE — 70450 CT HEAD/BRAIN W/O DYE: CPT | Mod: TC

## 2019-08-07 PROCEDURE — 80053 COMPREHEN METABOLIC PANEL: CPT | Performed by: FAMILY MEDICINE

## 2019-08-07 PROCEDURE — 85610 PROTHROMBIN TIME: CPT | Performed by: FAMILY MEDICINE

## 2019-08-07 PROCEDURE — 85730 THROMBOPLASTIN TIME PARTIAL: CPT | Performed by: FAMILY MEDICINE

## 2019-08-07 PROCEDURE — 25000128 H RX IP 250 OP 636: Performed by: FAMILY MEDICINE

## 2019-08-07 PROCEDURE — 36415 COLL VENOUS BLD VENIPUNCTURE: CPT | Performed by: FAMILY MEDICINE

## 2019-08-07 PROCEDURE — 93010 ELECTROCARDIOGRAM REPORT: CPT | Performed by: INTERNAL MEDICINE

## 2019-08-07 PROCEDURE — 93005 ELECTROCARDIOGRAM TRACING: CPT | Performed by: FAMILY MEDICINE

## 2019-08-07 PROCEDURE — 85025 COMPLETE CBC W/AUTO DIFF WBC: CPT | Performed by: FAMILY MEDICINE

## 2019-08-07 PROCEDURE — 81001 URINALYSIS AUTO W/SCOPE: CPT | Performed by: FAMILY MEDICINE

## 2019-08-07 PROCEDURE — 99283 EMERGENCY DEPT VISIT LOW MDM: CPT | Mod: Z6 | Performed by: FAMILY MEDICINE

## 2019-08-07 PROCEDURE — 84484 ASSAY OF TROPONIN QUANT: CPT | Performed by: FAMILY MEDICINE

## 2019-08-07 RX ADMIN — SODIUM CHLORIDE 1000 ML: 9 INJECTION, SOLUTION INTRAVENOUS at 23:34

## 2019-08-07 ASSESSMENT — ENCOUNTER SYMPTOMS
ABDOMINAL PAIN: 0
FEVER: 0
DIZZINESS: 0
LIGHT-HEADEDNESS: 0
BRUISES/BLEEDS EASILY: 0
NUMBNESS: 0
NAUSEA: 0
HEADACHES: 0
SPEECH DIFFICULTY: 0
DIAPHORESIS: 0
SORE THROAT: 0
FATIGUE: 0
DYSURIA: 0
BACK PAIN: 0
WEAKNESS: 1
PALPITATIONS: 0
TROUBLE SWALLOWING: 0
DIFFICULTY URINATING: 0
VOMITING: 0
FACIAL ASYMMETRY: 0
COUGH: 0
SHORTNESS OF BREATH: 0
APPETITE CHANGE: 0
DIARRHEA: 0
CONFUSION: 0
CHILLS: 0

## 2019-08-07 ASSESSMENT — MIFFLIN-ST. JEOR: SCORE: 1558.2

## 2019-08-07 NOTE — ED AVS SNAPSHOT
[facility]  [address]  [place] [address]  Phone:  [phone]  Fax:  [phone]                                    Chao Kearney   MRN: [id number]    Department:  [facility]   Date of Visit:  8/7/2019           After Visit Summary Signature Page    I have received my discharge instructions, and my questions have been answered. I have discussed any challenges I see with this plan with the nurse or doctor.    ..........................................................................................................................................  Patient/Patient Representative Signature      ..........................................................................................................................................  Patient Representative Print Name and Relationship to Patient    ..................................................               ................................................  Date                                   Time    ..........................................................................................................................................  Reviewed by Signature/Title    ...................................................              ..............................................  Date                                               Time          22EPIC Rev 08/18

## 2019-08-08 ENCOUNTER — MYC MEDICAL ADVICE (OUTPATIENT)
Dept: FAMILY MEDICINE | Facility: OTHER | Age: 74
End: 2019-08-08

## 2019-08-08 VITALS
HEART RATE: 59 BPM | BODY MASS INDEX: 28.02 KG/M2 | TEMPERATURE: 96.4 F | HEIGHT: 68 IN | SYSTOLIC BLOOD PRESSURE: 128 MMHG | OXYGEN SATURATION: 97 % | DIASTOLIC BLOOD PRESSURE: 69 MMHG | WEIGHT: 184.9 LBS | RESPIRATION RATE: 11 BRPM
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
## 2019-08-08 LAB
ALBUMIN UR-MCNC: NEGATIVE MG/DL
APPEARANCE UR: CLEAR
BACTERIA #/AREA URNS HPF: ABNORMAL /HPF
BILIRUB UR QL STRIP: NEGATIVE
COLOR UR AUTO: YELLOW
GLUCOSE UR STRIP-MCNC: NEGATIVE MG/DL
HGB UR QL STRIP: ABNORMAL
KETONES UR STRIP-MCNC: NEGATIVE MG/DL
LEUKOCYTE ESTERASE UR QL STRIP: NEGATIVE
MUCOUS THREADS #/AREA URNS LPF: PRESENT /LPF
NITRATE UR QL: NEGATIVE
PH UR STRIP: 6 PH (ref 5–7)
RBC #/AREA URNS AUTO: ABNORMAL /HPF
SOURCE: ABNORMAL
SP GR UR STRIP: >=1.03 (ref 1–1.03)
TROPONIN I SERPL-MCNC: <0.03 UG/L (ref 0–0.03)
UROBILINOGEN UR STRIP-ACNC: 0.2 EU/DL (ref 0.2–1)
WBC #/AREA URNS AUTO: ABNORMAL /HPF

## 2019-08-08 PROCEDURE — 36415 COLL VENOUS BLD VENIPUNCTURE: CPT | Mod: 91,GZ | Performed by: FAMILY MEDICINE

## 2019-08-08 PROCEDURE — 36415 COLL VENOUS BLD VENIPUNCTURE: CPT | Performed by: FAMILY MEDICINE

## 2019-08-08 PROCEDURE — 84484 ASSAY OF TROPONIN QUANT: CPT | Mod: 91 | Performed by: FAMILY MEDICINE

## 2019-08-08 PROCEDURE — 84484 ASSAY OF TROPONIN QUANT: CPT | Performed by: FAMILY MEDICINE

## 2019-08-08 PROCEDURE — 25000132 ZZH RX MED GY IP 250 OP 250 PS 637: Mod: GY | Performed by: FAMILY MEDICINE

## 2019-08-08 RX ORDER — MECLIZINE HCL 12.5 MG 12.5 MG/1
25 TABLET ORAL 4 TIMES DAILY PRN
Qty: 20 TABLET | Refills: 0 | Status: SHIPPED | OUTPATIENT
Start: 2019-08-08 | End: 2020-11-24

## 2019-08-08 RX ORDER — MECLIZINE HCL 12.5 MG 12.5 MG/1
25 TABLET ORAL 4 TIMES DAILY PRN
Qty: 20 TABLET | Refills: 0 | Status: SHIPPED | OUTPATIENT
Start: 2019-08-08 | End: 2019-08-08

## 2019-08-08 RX ORDER — MECLIZINE HCL 12.5 MG 12.5 MG/1
25 TABLET ORAL ONCE
Status: COMPLETED | OUTPATIENT
Start: 2019-08-08 | End: 2019-08-08

## 2019-08-08 RX ADMIN — MECLIZINE 25 MG: 12.5 TABLET ORAL at 01:24

## 2019-08-08 NOTE — ED NOTES
"When RN sat patient up at the edge of the bed to obtain a urine sample he stated he didn't feel right. \"I am spinning.\" \"Everything moves; It is just not normal.\" \"The floor is not steady.\" \"I have pressure behind both of my ears; it is not pain, but a pressure.\"  Nicole BEST, RN on 8/8/2019 at 12:08 AM    "

## 2019-08-08 NOTE — ED PROVIDER NOTES
History     Chief Complaint   Patient presents with     Dizziness     HPI  Chao Kearney is a 73 year old male who presents to the emergency room from home complaining of generalized weakness and just not feeling right.  He states that he went out on the porch to watch TV and just did not feel well.  He is a very poor and vague historian and is not able to report what he means by that any more clearly.  He states that when he stood up his legs felt weak and he felt a little bit off balance but then that only lasted for a few seconds and he is feeling back to his baseline.  He states that he is concerned that he has another brain bleed because he is just not feeling right.  He states that he had a very long day today and got up this morning, worked out, drove to [place] and then back again.  He states that he forgot to take his regular medications until he got back home again this afternoon.    Patient denies any headache, focal neurologic symptoms, fever, sweats, chills, URI type symptoms, sore throat, cough, chest pain, shortness of breath, abdominal pain, nausea, vomiting, diarrhea, dysuria, lower extremity pain or swelling.    Patient states that his neck is sore.  He reports that he does have problems with neck pain and feels that driving made his neck more sore than usual.  He denies any inciting event or injury.  He denies any neck stiffness.    Allergies:  No Known Allergies    Problem List:    Patient Active Problem List    Diagnosis Date Noted     Generalized anxiety disorder 01/17/2018     Priority: Medium     Elevated prostate specific antigen (PSA) 01/17/2018     Priority: Medium     Overview:   in October 2005.  The patient is taking Levaquin daily for two months.  He   will have another PSA checked in October 2006, recommended by Dr. Wilson.       Hyperlipidemia 01/17/2018     Priority: Medium     Hypertension 01/17/2018     Priority: Medium     Plantar fasciitis 01/17/2018     Priority: Medium      Overview:   right       Benign non-nodular prostatic hyperplasia with lower urinary tract symptoms 07/27/2016     Priority: Medium     Brain injury (H) 01/16/2015     Priority: Medium     Retention of urine 12/12/2014     Priority: Medium     Right hip pain 10/10/2014     Priority: Medium     Anemia 09/22/2014     Priority: Medium     Hydrocephalus 09/22/2014     Priority: Medium     SAH (subarachnoid hemorrhage) (H) 09/22/2014     Priority: Medium     Actinic keratosis 01/28/2014     Priority: Medium     Seborrheic keratosis 01/28/2014     Priority: Medium     H/O adenomatous polyp of colon 04/27/2010     Priority: Medium     Diverticulosis of sigmoid colon 04/26/2010     Priority: Medium        Past Medical History:    Past Medical History:   Diagnosis Date     Actinic keratosis      Anemia      Diverticulosis of large intestine without perforation or abscess without bleeding      Elevated prostate specific antigen (PSA)      Essential (primary) hypertension      Generalized anxiety disorder      History of colonic polyps      Hydrocephalus      Hyperlipidemia      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)      Lesion of oral mucosa      Nontraumatic subarachnoid hemorrhage (H)      Osteoarthritis of hip      Other seborrheic keratosis      Other specified disorders of bone, unspecified site (CODE)      Pain in right hip      Personal history of other medical treatment (CODE)      Plantar fascial fibromatosis      Retention of urine      Screening for other and unspecified cardiovascular conditions      Stiffness of unspecified joint, not elsewhere classified        Past Surgical History:    Past Surgical History:   Procedure Laterality Date     COLONOSCOPY  04/26/2010    (04/26/2010),F/U 2015     COLONOSCOPY  10/15/2015    10/15/15,F/U 2025     EXTRACAPSULAR CATARACT EXTRATION WITH INTRAOCULAR LENS IMPLANT      2016     OTHER SURGICAL HISTORY      3/26/14,65776.0,NV TOTAL HIP ARTHROPLASTY,Left,direct  anterior approach, Dr. Lama     OTHER SURGICAL HISTORY      2014,[id number],CRANIOTOMY     OTHER SURGICAL HISTORY      2016,,HERNIA REPAIR,Left,LIH with mesh       Family History:    Family History   Problem Relation Age of Onset     Heart Disease Father 50        Heart Disease,heart failure     Other - See Comments Father          MS, long-standing     Cancer Mother 78        Cancer, lung cancer at age 78, developed brain metastases     Cancer Paternal Grandfather         Cancer, of cancer of unknown type     Other - See Comments Sister          MS       Social History:  Marital Status:   [2]  Social History     Tobacco Use     Smoking status: Former Smoker     Smokeless tobacco: Never Used   Substance Use Topics     Alcohol use: Yes     Alcohol/week: 1.2 oz     Types: 2 Cans of beer per week     Comment: 2-3 times/week     Drug use: No     Comment: Drug use: No        Medications:      aspirin EC 81 MG EC tablet   Cholecalciferol (VITAMIN D3) 1000 UNITS CAPS   hydrochlorothiazide (HYDRODIURIL) 12.5 MG tablet   lisinopril (PRINIVIL/ZESTRIL) 40 MG tablet   meclizine (ANTIVERT) 12.5 MG tablet   Multiple Vitamins-Minerals (MULTILEX-T&M) TABS   rosuvastatin (CRESTOR) 20 MG tablet   tamsulosin (FLOMAX) 0.4 MG capsule   triamcinolone (KENALOG) 0.1 % external cream   sildenafil (VIAGRA) 100 MG tablet         Review of Systems   Constitutional: Negative for appetite change, chills, diaphoresis, fatigue and fever.   HENT: Negative for congestion, sore throat and trouble swallowing.    Eyes: Negative for visual disturbance.   Respiratory: Negative for cough and shortness of breath.    Cardiovascular: Negative for chest pain, palpitations and leg swelling.   Gastrointestinal: Negative for abdominal pain, diarrhea, nausea and vomiting.   Genitourinary: Negative for difficulty urinating and dysuria.   Musculoskeletal: Negative for back pain.   Skin: Negative for pallor.   Neurological: Positive for  "weakness. Negative for dizziness, syncope, facial asymmetry, speech difficulty, light-headedness, numbness and headaches.   Hematological: Does not bruise/bleed easily.   Psychiatric/Behavioral: Negative for confusion.   All other systems reviewed and are negative.      Physical Exam   BP: (!) 146/87  Pulse: 56  Heart Rate: 57  Temp: 96.4  F (35.8  C)  Resp: 18  Height: 172.7 cm (5' 8\")  Weight: 83.9 kg (184 lb 14.4 oz)  SpO2: 99 %      Physical Exam   Constitutional: He is oriented to person, place, and time. He appears well-developed and well-nourished. He is cooperative. He does not appear ill. No distress.   HENT:   Head: Normocephalic and atraumatic.   Right Ear: External ear normal.   Left Ear: External ear normal.   Nose: Nose normal.   Mouth/Throat: Uvula is midline, oropharynx is clear and moist and mucous membranes are normal.   Eyes: Pupils are equal, round, and reactive to light. Conjunctivae and EOM are normal.   Neck: Normal range of motion. Neck supple.   Cardiovascular: Normal rate, regular rhythm, normal heart sounds and intact distal pulses.   No murmur heard.  Pulmonary/Chest: Effort normal and breath sounds normal. No respiratory distress.   Abdominal: Soft. Bowel sounds are normal. There is no tenderness.   Musculoskeletal: Normal range of motion. He exhibits no edema or tenderness.   Lymphadenopathy:     He has no cervical adenopathy.   Neurological: He is alert and oriented to person, place, and time. He has normal strength. No cranial nerve deficit or sensory deficit. Coordination normal. GCS eye subscore is 4. GCS verbal subscore is 5. GCS motor subscore is 6.   Skin: Skin is warm. Capillary refill takes less than 2 seconds. He is not diaphoretic. No erythema.   Psychiatric: He has a normal mood and affect.   Nursing note and vitals reviewed.      ED Course     Procedures         EKG Interpretation:      Interpreted by Jj Chang MD  Time reviewed: 2238  Symptoms at time of EKG: " generalized weakness   Rhythm: sinus bradycardia  Rate: 56  Axis: Left Axis Deviation  Ectopy: none  Conduction: normal  ST Segments/ T Waves: Non-specific ST-T wave changes  Q Waves: none  Comparison to prior: Unchanged    Clinical Impression: no acute changes and sinus bradycardia    Critical Care time:  none    Results for orders placed or performed during the hospital encounter of 08/07/19 (from the past 24 hour(s))   CBC with platelets differential   Result Value Ref Range    WBC 5.6 4.0 - 11.0 10e9/L    RBC Count 4.43 4.4 - 5.9 10e12/L    Hemoglobin 14.2 13.3 - 17.7 g/dL    Hematocrit 41.1 40.0 - 53.0 %    MCV 93 78 - 100 fl    MCH 32.1 26.5 - 33.0 pg    MCHC 34.5 31.5 - 36.5 g/dL    RDW 12.5 10.0 - 15.0 %    Platelet Count 169 150 - 450 10e9/L    Diff Method Automated Method     % Neutrophils 57.5 %    % Lymphocytes 25.8 %    % Monocytes 13.5 %    % Eosinophils 2.3 %    % Basophils 0.7 %    % Immature Granulocytes 0.2 %    Absolute Neutrophil 3.2 1.6 - 8.3 10e9/L    Absolute Lymphocytes 1.4 0.8 - 5.3 10e9/L    Absolute Monocytes 0.8 0.0 - 1.3 10e9/L    Absolute Eosinophils 0.1 0.0 - 0.7 10e9/L    Absolute Basophils 0.0 0.0 - 0.2 10e9/L    Abs Immature Granulocytes 0.0 0 - 0.4 10e9/L   Troponin I   Result Value Ref Range    Troponin I ES <0.030 0.000 - 0.034 ug/L   INR   Result Value Ref Range    INR 0.95 0 - 1.3   Partial thromboplastin time   Result Value Ref Range    PTT 30 22 - 37 sec   Comprehensive metabolic panel   Result Value Ref Range    Sodium 137 134 - 144 mmol/L    Potassium 3.6 3.5 - 5.1 mmol/L    Chloride 101 98 - 107 mmol/L    Carbon Dioxide 29 21 - 31 mmol/L    Anion Gap 7 3 - 14 mmol/L    Glucose 114 (H) 70 - 105 mg/dL    Urea Nitrogen 19 7 - 25 mg/dL    Creatinine 1.18 0.70 - 1.30 mg/dL    GFR Estimate 61 >60 mL/min/[1.73_m2]    GFR Estimate If Black 73 >60 mL/min/[1.73_m2]    Calcium 9.2 8.6 - 10.3 mg/dL    Bilirubin Total 0.7 0.3 - 1.0 mg/dL    Albumin 4.3 3.5 - 5.7 g/dL    Protein Total  "6.7 6.4 - 8.9 g/dL    Alkaline Phosphatase 42 34 - 104 U/L    ALT 14 7 - 52 U/L    AST 23 13 - 39 U/L   Magnesium   Result Value Ref Range    Magnesium 2.3 1.9 - 2.7 mg/dL   Nt probnp inpatient (BNP)   Result Value Ref Range    N-Terminal Pro BNP Inpatient 13 0 - 100 pg/mL   CT Head w/o Contrast    Narrative    EXAM:   CT Head Without Contrast     EXAM DATE/TIME:   8/7/2019 11:01 PM     CLINICAL HISTORY:   73 years old, male; Dizziness; Patient HX: C/O feeling weak, his mouth is dry,   wife says he didn't take his daily medication until 1600. PT says he didn't   feel right when he came down to the porch. He sat down \T\ when he got up he just   felt like something was wrong and decided to come in. He felt unsteady. Also   C/O sore neck but always has some neck issues. PT says he felt \"real wobbly\"   and feels weak, denies h/a. Wife reports he had a brain bleed 5 years ago. ;   Additional info: See the clinical information for interpreting provider     TECHNIQUE:   Imaging protocol: Computed tomography images of the head without contrast.   Coronal and sagittal reformatted images were created and reviewed.   Radiation optimization: All CT scans at this facility use at least one of these   dose optimization techniques: automated exposure control; mA and/or kV   adjustment per patient size (includes targeted exams where dose is matched to   clinical indication); or iterative reconstruction.     COMPARISON:   CT HEAD STROKE PROTOCOL TPA CANDIDATE WO 9/21/2014 7:27 PM     FINDINGS:   Brain: No acute intracranial hemorrhage. No intracranial mass or mass effect.   Age-related cerebral cortical and cerebellar hemispheric volume loss. Since the   prior CT study of 09/21/2014, the subarachnoid blood in the basilar cistern has   cleared.   Ventricles: Ventriculomegaly out of portion to the degree of cerebral cortical   volume loss. Moderate hydrocephalus at that time with blood layering in the   occipital horns. "   Bones/joints: Defect involving the right frontal lobe with its corresponding   kirsten hole most likely representing a ventriculostomy tube tract.   Sinuses: Visualized sinuses are unremarkable. No fluid levels.   Mastoid air cells: Visualized mastoid air cells are well aerated. No mastoid   effusion.   Soft tissues: Unremarkable.       Impression    IMPRESSION:   1. No acute intracranial hemorrhage. No intra-cranial mass.   2. Defect involving the right frontal lobe next to kirsten hole most likely   representing a ventriculostomy tube tract.   3. Ventriculomegaly out of proportion to degree of cerebral cortical volume   loss. This may represent secondary obstructive hydrocephalus.     THIS DOCUMENT HAS BEEN ELECTRONICALLY SIGNED BY NICOLAS WOODARD MD   Troponin I   Result Value Ref Range    Troponin I ES <0.030 0.000 - 0.034 ug/L       Medications   0.9% sodium chloride BOLUS (0 mLs Intravenous Stopped 8/8/19 0033)   meclizine (ANTIVERT) tablet 25 mg (25 mg Oral Given 8/8/19 0124)       Assessments & Plan (with Medical Decision Making)     I have reviewed the nursing notes.    EKG was obtained. There is no evidence of acute ischemia. Troponin was negative x 2.    Chest x-ray was performed. There is no evidence pneumonia or pneumothorax.    CT Head shows possible ventricular obstruction as above.    Lab tests and X-rays were reviewed as above.     Results were consistent with vertigo/dizziness due to undetermined etiology.    Patient received medications as above.  2348 - patient discussed with Dr Truong of Neurology on call at [facility] who recommends outpatient follow up in clinic in 1-2 weeks..  2351 -patient and the family are updated on all results to date.  We are still awaiting repeat 2-hour troponin.  0143 -repeat troponin is negative.  The patient reports that he continues to feel dizzy and slightly off balance.  Patient is given meclizine.    Patient is walked around the emergency room unassisted and had  no difficulty.  He does continue to report that he feels a little bit dizzy and off-balance but did not have any gait imbalance at all.    I had a discussion with the patient and the family regarding the symptoms, exam, laboratory, CT Scan, X ray results, diagnosis, and plan.    I answered all questions to the best of my ability.  The patient is discharged home in good condition.  Follow-up with primary care physician.  The patient and his wife are instructed to make certain that they call for that appointment later this morning.  Dr. Leonard office will call the patient to schedule an outpatient appointment later today.  Meclizine as needed.  The patient voiced understanding of the plan, was agreeable, and had no further questions or concerns. Advised to return for any worsening symptoms.         Medication List      Started    meclizine 12.5 MG tablet  Commonly known as:  ANTIVERT  25 mg, Oral, 4 TIMES DAILY PRN            Final diagnoses:   Generalized muscle weakness   Dizziness       8/7/2019   [facility]     Jj Chang MD  08/08/19 0144

## 2019-08-08 NOTE — ED TRIAGE NOTES
"Pt admit to [facility], C/O feeling weak, his mouth is dry, wife says he didn't take his daily medication until 1600.  Pt says he didn't feel right when he came down to the porch.  He sat down & when he got up he just felt like something was wrong and decided to come in.  He felt unsteady.  Also C/O sore neck but always has some neck issues.    Pt says he felt \"real wobbly\" and feels weak, denies H/A.  Wife reports he had a brain bleed 5 years ago.  "
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
## 2019-08-09 DIAGNOSIS — R97.20 ELEVATED PROSTATE SPECIFIC ANTIGEN (PSA): ICD-10-CM

## 2019-08-09 DIAGNOSIS — N40.1 BENIGN NON-NODULAR PROSTATIC HYPERPLASIA WITH LOWER URINARY TRACT SYMPTOMS: ICD-10-CM

## 2019-08-09 DIAGNOSIS — N42.9 PROSTATE DISORDER: ICD-10-CM

## 2019-08-09 LAB — PSA SERPL-MCNC: 6.73 NG/ML

## 2019-08-09 PROCEDURE — 86618 LYME DISEASE ANTIBODY: CPT | Performed by: FAMILY MEDICINE

## 2019-08-09 PROCEDURE — 36415 COLL VENOUS BLD VENIPUNCTURE: CPT | Performed by: FAMILY MEDICINE

## 2019-08-09 PROCEDURE — 84153 ASSAY OF PSA TOTAL: CPT | Performed by: FAMILY MEDICINE

## 2019-08-12 ENCOUNTER — MYC MEDICAL ADVICE (OUTPATIENT)
Dept: FAMILY MEDICINE | Facility: OTHER | Age: 74
End: 2019-08-12

## 2019-08-12 LAB — B BURGDOR IGG+IGM SER QL: 0.2 (ref 0–0.89)

## 2019-08-13 ENCOUNTER — MYC MEDICAL ADVICE (OUTPATIENT)
Dept: FAMILY MEDICINE | Facility: OTHER | Age: 74
End: 2019-08-13

## 2019-09-17 DIAGNOSIS — E78.5 HYPERLIPIDEMIA, UNSPECIFIED HYPERLIPIDEMIA TYPE: ICD-10-CM

## 2019-09-17 DIAGNOSIS — I10 ESSENTIAL HYPERTENSION, BENIGN: ICD-10-CM

## 2019-09-23 RX ORDER — LISINOPRIL 40 MG/1
TABLET ORAL
Qty: 90 TABLET | Refills: 3 | Status: SHIPPED | OUTPATIENT
Start: 2019-08-06 | End: 2020-06-29

## 2019-09-23 RX ORDER — ROSUVASTATIN CALCIUM 20 MG/1
TABLET, COATED ORAL
Qty: 90 TABLET | Refills: 3 | Status: SHIPPED | OUTPATIENT
Start: 2019-08-06 | End: 2020-06-29

## 2019-09-23 RX ORDER — HYDROCHLOROTHIAZIDE 12.5 MG/1
TABLET ORAL
Qty: 90 TABLET | Refills: 3 | Status: SHIPPED | OUTPATIENT
Start: 2019-08-06 | End: 2020-06-29

## 2019-09-23 NOTE — TELEPHONE ENCOUNTER
Last Office Visit: 8/6/2019    Future Office visit:  No future appointment scheduled at this time.    The following scripts were sent to [facility] and should of went to [facility].  Called [facility] and these medications were never filled.  Resending to [facility].    lisinopril (PRINIVIL/ZESTRIL) 40 MG tablet  Take 1 tablet (40 mg) by mouth daily         Last Written Prescription Date:  8/6/2019    Last Fill Quantity: 90,   # refills: 3    hydrochlorothiazide (HYDRODIURIL) 12.5 MG tablet  Take 1 tablet (12.5 mg) by mouth daily          Last Written Prescription Date:  8/6/2019    Last Fill Quantity: 90,   # refills: 3     rosuvastatin (CRESTOR) 20 MG tablet  Take 1 tablet (20 mg) by mouth At Bedtime         Last Written Prescription Date:  8/6/2019    Last Fill Quantity: 90,   # refills: 3      Letty Greene RN  ....................  9/23/2019   10:04 AM

## 2019-10-14 ENCOUNTER — ALLIED HEALTH/NURSE VISIT (OUTPATIENT)
Dept: FAMILY MEDICINE | Facility: OTHER | Age: 74
End: 2019-10-14
Attending: PHYSICIAN ASSISTANT
Payer: MEDICARE

## 2019-10-14 DIAGNOSIS — Z23 NEED FOR PROPHYLACTIC VACCINATION AND INOCULATION AGAINST INFLUENZA: Primary | ICD-10-CM

## 2019-10-14 PROCEDURE — G0008 ADMIN INFLUENZA VIRUS VAC: HCPCS

## 2019-10-14 PROCEDURE — 90662 IIV NO PRSV INCREASED AG IM: CPT

## 2020-02-05 ENCOUNTER — TRANSFERRED RECORDS (OUTPATIENT)
Dept: HEALTH INFORMATION MANAGEMENT | Facility: OTHER | Age: 75
End: 2020-02-05

## 2020-02-10 ENCOUNTER — MYC MEDICAL ADVICE (OUTPATIENT)
Dept: FAMILY MEDICINE | Facility: OTHER | Age: 75
End: 2020-02-10

## 2020-02-10 DIAGNOSIS — N52.9 ERECTILE DYSFUNCTION, UNSPECIFIED ERECTILE DYSFUNCTION TYPE: Primary | ICD-10-CM

## 2020-02-10 RX ORDER — SILDENAFIL 100 MG/1
TABLET, FILM COATED ORAL
Qty: 20 TABLET | Status: SHIPPED | OUTPATIENT
Start: 2020-02-10 | End: 2021-09-21

## 2020-03-11 ENCOUNTER — HEALTH MAINTENANCE LETTER (OUTPATIENT)
Age: 75
End: 2020-03-11

## 2020-06-26 ENCOUNTER — HOSPITAL ENCOUNTER (EMERGENCY)
Facility: OTHER | Age: 75
Discharge: HOME | End: 2020-06-26
Attending: PHYSICIAN ASSISTANT | Admitting: PHYSICIAN ASSISTANT
Payer: COMMERCIAL

## 2020-06-26 ENCOUNTER — MYC MEDICAL ADVICE (OUTPATIENT)
Dept: FAMILY MEDICINE | Facility: OTHER | Age: 75
End: 2020-06-26

## 2020-06-26 VITALS
DIASTOLIC BLOOD PRESSURE: 80 MMHG | TEMPERATURE: 97.7 F | RESPIRATION RATE: 18 BRPM | BODY MASS INDEX: 27.28 KG/M2 | HEIGHT: 68 IN | OXYGEN SATURATION: 98 % | HEART RATE: 62 BPM | WEIGHT: 180 LBS | SYSTOLIC BLOOD PRESSURE: 137 MMHG

## 2020-06-26 DIAGNOSIS — S80.10XA SUPERFICIAL BRUISING OF LOWER LEG: ICD-10-CM

## 2020-06-26 DIAGNOSIS — B35.6 JOCK ITCH: ICD-10-CM
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
## 2020-06-26 PROCEDURE — 99284 EMERGENCY DEPT VISIT MOD MDM: CPT | Mod: Z6 | Performed by: PHYSICIAN ASSISTANT

## 2020-06-26 PROCEDURE — 99282 EMERGENCY DEPT VISIT SF MDM: CPT | Performed by: PHYSICIAN ASSISTANT

## 2020-06-26 RX ORDER — CLOTRIMAZOLE 1 %
CREAM (GRAM) TOPICAL 2 TIMES DAILY
Qty: 45 G | Refills: 0 | Status: SHIPPED | OUTPATIENT
Start: 2020-06-26 | End: 2020-07-11

## 2020-06-26 ASSESSMENT — ENCOUNTER SYMPTOMS
CHEST TIGHTNESS: 0
BACK PAIN: 0
ADENOPATHY: 0
CHILLS: 0
ABDOMINAL PAIN: 0
SHORTNESS OF BREATH: 0
FEVER: 0
HEMATURIA: 0
WOUND: 1
CONFUSION: 0
BRUISES/BLEEDS EASILY: 0

## 2020-06-26 ASSESSMENT — MIFFLIN-ST. JEOR: SCORE: 1530.97

## 2020-06-26 NOTE — ED TRIAGE NOTES
Noted to have a quarter size spot/swelling on right upper thigh, and has been growing/swelling in size. Changing rapidly. States also had a deer tick on left side. No pain, no numbness or tingling.

## 2020-06-26 NOTE — DISCHARGE INSTRUCTIONS
Get plenty of fluids and rest.  I recommend you elevate your right leg.  Keep your tick bite wound clean with soap and water.  Also keep your rash clean and dry with soap and water and apply over-the-counter antifungal cream for jock itch and I did prescribe you some cream if you needed it which can be picked up at [facility].  Continue to wash the bruise on your right hip, it is unclear what causes this but currently does not appear to be emergency and hopefully will gradually get better, if it is not if it is getting worse including increased pain, swelling that I recommend he return for further evaluation.  Follow-up with PCP as needed.

## 2020-06-26 NOTE — ED AVS SNAPSHOT
[facility]  [address]  [place] [address]  Phone:  [phone]  Fax:  [phone]                                    Chao Kearney   MRN: [id number]    Department:  [facility]   Date of Visit:  6/26/2020           After Visit Summary Signature Page    I have received my discharge instructions, and my questions have been answered. I have discussed any challenges I see with this plan with the nurse or doctor.    ..........................................................................................................................................  Patient/Patient Representative Signature      ..........................................................................................................................................  Patient Representative Print Name and Relationship to Patient    ..................................................               ................................................  Date                                   Time    ..........................................................................................................................................  Reviewed by Signature/Title    ...................................................              ..............................................  Date                                               Time          22EPIC Rev 08/18

## 2020-06-26 NOTE — ED PROVIDER NOTES
History     Chief Complaint   Patient presents with     Leg Swelling     HPI  Chao Kearney is a 74 year old male who presents to the ED for evaluation of a tick bite, groin rash, and a bruise to right hip area. Pt suffers from short term memory loss and therefore is a poor historian. He does have his wife with him that helps provide hx. he began to notice a bruising to his right hip this afternoon that he says have gotten much bigger.  Although, his wife says it has gotten smaller.  She did remove a tick from his left inner groin earlier today and says that she had gotten all parts out.  He has had a recurring rash around his genital area that is slightly uncomfortable.  He denies any chest pain, shortness of breath, recent fevers or sicknesses, abdominal pain, dysuria, testicle pain or diarrhea.    Allergies:  No Known Allergies    Problem List:    Patient Active Problem List    Diagnosis Date Noted     Generalized anxiety disorder 01/17/2018     Priority: Medium     Elevated prostate specific antigen (PSA) 01/17/2018     Priority: Medium     Overview:   in October 2005.  The patient is taking Levaquin daily for two months.  He   will have another PSA checked in October 2006, recommended by Dr. Wilson.       Hyperlipidemia 01/17/2018     Priority: Medium     Hypertension 01/17/2018     Priority: Medium     Plantar fasciitis 01/17/2018     Priority: Medium     Overview:   right       Benign non-nodular prostatic hyperplasia with lower urinary tract symptoms 07/27/2016     Priority: Medium     Brain injury (H) 01/16/2015     Priority: Medium     Retention of urine 12/12/2014     Priority: Medium     Right hip pain 10/10/2014     Priority: Medium     Anemia 09/22/2014     Priority: Medium     Hydrocephalus (H) 09/22/2014     Priority: Medium     SAH (subarachnoid hemorrhage) (H) 09/22/2014     Priority: Medium     Actinic keratosis 01/28/2014     Priority: Medium     Seborrheic keratosis 01/28/2014     Priority:  Medium     H/O adenomatous polyp of colon 2010     Priority: Medium     Diverticulosis of sigmoid colon 2010     Priority: Medium        Past Medical History:    Past Medical History:   Diagnosis Date     Actinic keratosis      Anemia      Diverticulosis of large intestine without perforation or abscess without bleeding      Elevated prostate specific antigen (PSA)      Essential (primary) hypertension      Generalized anxiety disorder      History of colonic polyps      Hydrocephalus (H)      Hyperlipidemia      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)      Lesion of oral mucosa      Nontraumatic subarachnoid hemorrhage (H)      Osteoarthritis of hip      Other seborrheic keratosis      Other specified disorders of bone, unspecified site (CODE)      Pain in right hip      Personal history of other medical treatment (CODE)      Plantar fascial fibromatosis      Retention of urine      Screening for other and unspecified cardiovascular conditions      Stiffness of unspecified joint, not elsewhere classified        Past Surgical History:    Past Surgical History:   Procedure Laterality Date     COLONOSCOPY  2010    (2010),F/U      COLONOSCOPY  10/15/2015    10/15/15,F/U      EXTRACAPSULAR CATARACT EXTRATION WITH INTRAOCULAR LENS IMPLANT           OTHER SURGICAL HISTORY      3/26/14,38257.0,MO TOTAL HIP ARTHROPLASTY,Left,direct anterior approach, Dr. Lama     OTHER SURGICAL HISTORY      ,[id number],CRANIOTOMY     OTHER SURGICAL HISTORY      2016,,HERNIA REPAIR,Left,LIH with mesh       Family History:    Family History   Problem Relation Age of Onset     Heart Disease Father 50        Heart Disease,heart failure     Other - See Comments Father          MS, long-standing     Cancer Mother 78        Cancer, lung cancer at age 78, developed brain metastases     Cancer Paternal Grandfather         Cancer, of cancer of unknown type     Other - See Comments  "Sister          MS       Social History:  Marital Status:   [2]  Social History     Tobacco Use     Smoking status: Former Smoker     Smokeless tobacco: Never Used   Substance Use Topics     Alcohol use: Yes     Alcohol/week: 2.0 standard drinks     Types: 2 Cans of beer per week     Comment: 2-3 times/week     Drug use: No     Comment: Drug use: No        Medications:    aspirin EC 81 MG EC tablet  Cholecalciferol (VITAMIN D3) 1000 UNITS CAPS  clotrimazole (LOTRIMIN) 1 % external cream  hydrochlorothiazide (HYDRODIURIL) 12.5 MG tablet  lisinopril (PRINIVIL/ZESTRIL) 40 MG tablet  Multiple Vitamins-Minerals (MULTILEX-T&M) TABS  rosuvastatin (CRESTOR) 20 MG tablet  sildenafil (VIAGRA) 100 MG tablet  tamsulosin (FLOMAX) 0.4 MG capsule  meclizine (ANTIVERT) 12.5 MG tablet  triamcinolone (KENALOG) 0.1 % external cream          Review of Systems   Constitutional: Negative for chills and fever.   HENT: Negative for congestion.    Eyes: Negative for visual disturbance.   Respiratory: Negative for chest tightness and shortness of breath.    Cardiovascular: Negative for chest pain.   Gastrointestinal: Negative for abdominal pain.   Genitourinary: Negative for hematuria.   Musculoskeletal: Negative for back pain.   Skin: Positive for rash and wound.   Neurological: Negative for syncope.   Hematological: Negative for adenopathy. Does not bruise/bleed easily.   Psychiatric/Behavioral: Negative for confusion.       Physical Exam   BP: 137/80  Pulse: 62  Temp: 97.7  F (36.5  C)  Resp: 18  Height: 172.7 cm (5' 8\")  Weight: 81.6 kg (180 lb)  SpO2: 98 %      Physical Exam  Constitutional:       General: He is not in acute distress.     Appearance: He is well-developed. He is not diaphoretic.   HENT:      Head: Normocephalic and atraumatic.   Eyes:      General: No scleral icterus.     Conjunctiva/sclera: Conjunctivae normal.   Neck:      Musculoskeletal: Neck supple.   Cardiovascular:      Rate and Rhythm: Normal rate and " regular rhythm.   Pulmonary:      Effort: Pulmonary effort is normal.      Breath sounds: Normal breath sounds.   Abdominal:      Palpations: Abdomen is soft.      Tenderness: There is no abdominal tenderness.   Musculoskeletal:         General: No deformity.   Lymphadenopathy:      Cervical: No cervical adenopathy.   Skin:     General: Skin is warm and dry.      Findings: Bruising and rash present.      Comments: Approximate 4.5 x 4.5 cm bruise to lateral right hip. Erythema to genital region. Small pea sized break in skin to left inner thigh.    Neurological:      Mental Status: He is alert and oriented to person, place, and time. Mental status is at baseline.   Psychiatric:         Mood and Affect: Mood normal.         Behavior: Behavior normal.         ED Course        Procedures               Critical Care time:  none               No results found for this or any previous visit (from the past 24 hour(s)).    Medications   Tdap (tetanus-diptheria-acell pertussis) (BOOSTRIX) injection 0.5 mL (0.5 mLs Intramuscular Not Given 6/26/20 1906)       Assessments & Plan (with Medical Decision Making)   Pt nontoxic in NAD. Heart, lung, bowel sounds normal, abd soft, nontender to palpation, nondistended. VSS, afebrile    He does have Approximate 4.5 x 4.5 cm bruise to lateral right hip. Erythema to genital region. Small pea sized break in skin to left inner thigh.    It is unclear what caused the patient's bruise on his right hip, and as explained earlier due to the patient's short-term memory loss it is unknown whether he bumped his leg or had a traumatic event earlier today.  However, does not seem consistent with an infection, DVT at this time.  It causes him no pain and currently appears to be diminishing according to wife.  I think for now he can continue to watch this area and if it is worsening then he should return for further evaluation.    The area of erythema around his genital region seems consistent with  tinea cruris.  We will prescribe an antifungal cream at this time.  However, he is told to keep the area very dry and clean and if not improving in the next few days that he may want to be reassessed as a steroid cream may be needed.    The area where the patient's wife removed a tick from appears well.  He is encouraged to keep clean with soap and water.  His tetanus shot was updated in the emergency department.  They did already take 200 mg of doxycycline prophylactically which is what I would have given to him anyway and I see no further treatment that is needed at this time.    Strict return precautions are given to the pt, they will return if symptoms are worsening or concerning. The pt understands and agrees with the plan and they are discharged.     Sarthak Lindsey PA-C    I have reviewed the nursing notes.    I have reviewed the findings, diagnosis, plan and need for follow up with the patient.       Discharge Medication List as of 6/26/2020  7:07 PM      START taking these medications    Details   clotrimazole (LOTRIMIN) 1 % external cream Apply topically 2 times daily for 15 daysDisp-45 g,[id number]             Final diagnoses:   Superficial bruising of lower leg   Tick bite   Jock itch       6/26/2020   [facility]     Sarthak Lindsey PA  06/26/20 1936

## 2020-06-26 NOTE — ED NOTES
Per wife she pulled a tick off his left upper thigh today and she gave him 2 doxycycline this afternoon. Also states he has some noted redness and weeping in his groin on the left side.

## 2020-06-27 DIAGNOSIS — I10 ESSENTIAL HYPERTENSION, BENIGN: ICD-10-CM

## 2020-06-27 DIAGNOSIS — E78.5 HYPERLIPIDEMIA, UNSPECIFIED HYPERLIPIDEMIA TYPE: ICD-10-CM

## 2020-06-27 NOTE — LETTER
June 29, 2020      Chao Kearney  [address]  [place] [address]        Dear Chao,         This letter is to remind you that you are due for your annual exam with Chan Cook. Your last comprehensive visit was more than 12 months ago.    A LIMITED refill of your Crestor, Hydrodiuril, and lisinopril has been called into your pharmacy. Additional refills require you to complete this appointment.    Please call the clinic at [phone] to schedule your appointment.    If you should require additional refills before your scheduled appointment, please contact your pharmacy and we will refill your medication until the date of your appointment.    If you are no longer seeing Chan Cook for primary care, please call to let us know.     Thank you for choosing [facility] and [facility] for your health care needs.    Sincerely,    Refill RN  [facility]

## 2020-06-27 NOTE — ED NOTES
Went to room to see the patient and the room was empty. Patient was aware that he needed paperwork and a tetnus shot. Checked with admissions and they stated that the patient had left. Di Charge RN called the patient left a message about returning to ER for shot if he would like. No answer. Message left.

## 2020-06-29 RX ORDER — ROSUVASTATIN CALCIUM 20 MG/1
TABLET, COATED ORAL
Qty: 90 TABLET | Refills: 0 | Status: SHIPPED | OUTPATIENT
Start: 2020-06-29 | End: 2020-08-03

## 2020-06-29 RX ORDER — LISINOPRIL 40 MG/1
TABLET ORAL
Qty: 90 TABLET | Refills: 0 | Status: SHIPPED | OUTPATIENT
Start: 2020-06-29 | End: 2020-08-03

## 2020-06-29 RX ORDER — HYDROCHLOROTHIAZIDE 12.5 MG/1
TABLET ORAL
Qty: 90 TABLET | Refills: 0 | Status: SHIPPED | OUTPATIENT
Start: 2020-06-29 | End: 2020-08-03

## 2020-06-29 NOTE — TELEPHONE ENCOUNTER
" Disp  Refills  Start  End  ABAD    rosuvastatin (CRESTOR) 20 MG tablet  90 tablet  3  8/6/2019   No    Sig: TAKE 1 TABLET AT BEDTIME       Disp  Refills  Start  End  ABAD    hydrochlorothiazide (HYDRODIURIL) 12.5 MG tablet  90 tablet  3  8/6/2019   No    Sig: TAKE 1 TABLET EVERY DAY       Disp  Refills  Start  End  ABAD    lisinopril (PRINIVIL/ZESTRIL) 40 MG tablet  90 tablet  3  8/6/2019   No    Sig: TAKE 1 TABLET EVERY DAY        LOV: 8/6/2019  Future Office visit: No future appointment scheduled at this time.   Limited refill sent along with letter via mail and [employer].    Requested Prescriptions   Pending Prescriptions Disp Refills     lisinopril (ZESTRIL) 40 MG tablet [Pharmacy Med Name: LISINOPRIL 40 MG Tablet] 90 tablet 3     Sig: TAKE 1 TABLET EVERY DAY       ACE Inhibitors (Including Combos) Protocol Passed - 6/27/2020  1:52 PM        Passed - Blood pressure under 140/90 in past 12 months     BP Readings from Last 3 Encounters:   06/26/20 137/80   08/08/19 128/69   08/06/19 130/80                 Passed - Recent (12 mo) or future (30 days) visit within the authorizing provider's specialty     Patient has had an office visit with the authorizing provider or a provider within the authorizing providers department within the previous 12 mos or has a future within next 30 days. See \"Patient Info\" tab in inbasket, or \"Choose Columns\" in Meds & Orders section of the refill encounter.              Passed - Medication is active on med list        Passed - Patient is age 18 or older        Passed - Normal serum creatinine on file in past 12 months     Recent Labs   Lab Test 08/07/19  2300   CR 1.18       Ok to refill medication if creatinine is low          Passed - Normal serum potassium on file in past 12 months     Recent Labs   Lab Test 08/07/19  2300   POTASSIUM 3.6                hydrochlorothiazide (HYDRODIURIL) 12.5 MG tablet [Pharmacy Med Name: HYDROCHLOROTHIAZIDE 12.5 MG Tablet] 90 tablet 3     Sig: TAKE 1 " "TABLET EVERY DAY       Diuretics (Including Combos) Protocol Passed - 6/27/2020  1:52 PM        Passed - Blood pressure under 140/90 in past 12 months     BP Readings from Last 3 Encounters:   06/26/20 137/80   08/08/19 128/69   08/06/19 130/80                 Passed - Recent (12 mo) or future (30 days) visit within the authorizing provider's specialty     Patient has had an office visit with the authorizing provider or a provider within the authorizing providers department within the previous 12 mos or has a future within next 30 days. See \"Patient Info\" tab in inbasket, or \"Choose Columns\" in Meds & Orders section of the refill encounter.              Passed - Medication is active on med list        Passed - Patient is age 18 or older        Passed - Normal serum creatinine on file in past 12 months     Recent Labs   Lab Test 08/07/19  2300   CR 1.18              Passed - Normal serum potassium on file in past 12 months     Recent Labs   Lab Test 08/07/19  2300   POTASSIUM 3.6                    Passed - Normal serum sodium on file in past 12 months     Recent Labs   Lab Test 08/07/19  2300                    rosuvastatin (CRESTOR) 20 MG tablet [Pharmacy Med Name: ROSUVASTATIN CALCIUM 20 MG Tablet] 90 tablet 3     Sig: TAKE 1 TABLET AT BEDTIME       Statins Protocol Passed - 6/27/2020  1:52 PM        Passed - LDL on file in past 12 months     Recent Labs   Lab Test 08/06/19  0952   LDL 98             Passed - No abnormal creatine kinase in past 12 months     No lab results found.             Passed - Recent (12 mo) or future (30 days) visit within the authorizing provider's specialty     Patient has had an office visit with the authorizing provider or a provider within the authorizing providers department within the previous 12 mos or has a future within next 30 days. See \"Patient Info\" tab in inbasket, or \"Choose Columns\" in Meds & Orders section of the refill encounter.              Passed - Medication is " active on med list        Passed - Patient is age 18 or older         Letty Greene RN  ....................  6/29/2020   2:00 PM

## 2020-08-03 ENCOUNTER — OFFICE VISIT (OUTPATIENT)
Dept: FAMILY MEDICINE | Facility: OTHER | Age: 75
End: 2020-08-03
Attending: FAMILY MEDICINE
Payer: COMMERCIAL

## 2020-08-03 VITALS
SYSTOLIC BLOOD PRESSURE: 124 MMHG | HEIGHT: 68 IN | TEMPERATURE: 97.3 F | HEART RATE: 71 BPM | OXYGEN SATURATION: 98 % | RESPIRATION RATE: 17 BRPM | WEIGHT: 185.4 LBS | DIASTOLIC BLOOD PRESSURE: 74 MMHG | BODY MASS INDEX: 28.1 KG/M2

## 2020-08-03 DIAGNOSIS — S06.9X9S BRAIN INJURY WITH LOSS OF CONSCIOUSNESS, SEQUELA (H): ICD-10-CM

## 2020-08-03 DIAGNOSIS — F41.1 GENERALIZED ANXIETY DISORDER: ICD-10-CM

## 2020-08-03 DIAGNOSIS — N42.9 PROSTATE DISORDER: ICD-10-CM

## 2020-08-03 DIAGNOSIS — E78.5 HYPERLIPIDEMIA, UNSPECIFIED HYPERLIPIDEMIA TYPE: ICD-10-CM

## 2020-08-03 DIAGNOSIS — N40.1 BENIGN NON-NODULAR PROSTATIC HYPERPLASIA WITH LOWER URINARY TRACT SYMPTOMS: ICD-10-CM

## 2020-08-03 DIAGNOSIS — I10 ESSENTIAL HYPERTENSION: ICD-10-CM

## 2020-08-03 DIAGNOSIS — R97.20 ELEVATED PROSTATE SPECIFIC ANTIGEN (PSA): ICD-10-CM

## 2020-08-03 DIAGNOSIS — Z00.00 ENCOUNTER FOR MEDICARE ANNUAL WELLNESS EXAM: Primary | ICD-10-CM

## 2020-08-03 DIAGNOSIS — I10 ESSENTIAL HYPERTENSION, BENIGN: ICD-10-CM

## 2020-08-03 DIAGNOSIS — E78.2 MIXED HYPERLIPIDEMIA: ICD-10-CM
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
## 2020-08-03 LAB
ALBUMIN SERPL-MCNC: 4.4 G/DL (ref 3.5–5.7)
ALP SERPL-CCNC: 42 U/L (ref 34–104)
ALT SERPL W P-5'-P-CCNC: 14 U/L (ref 7–52)
ANION GAP SERPL CALCULATED.3IONS-SCNC: 5 MMOL/L (ref 3–14)
AST SERPL W P-5'-P-CCNC: 21 U/L (ref 13–39)
BASOPHILS # BLD AUTO: 0 10E9/L (ref 0–0.2)
BASOPHILS NFR BLD AUTO: 0.5 %
BILIRUB SERPL-MCNC: 0.4 MG/DL (ref 0.3–1)
BUN SERPL-MCNC: 21 MG/DL (ref 7–25)
CALCIUM SERPL-MCNC: 9.6 MG/DL (ref 8.6–10.3)
CHLORIDE SERPL-SCNC: 104 MMOL/L (ref 98–107)
CHOLEST SERPL-MCNC: 182 MG/DL
CO2 SERPL-SCNC: 31 MMOL/L (ref 21–31)
CREAT SERPL-MCNC: 1.04 MG/DL (ref 0.7–1.3)
DIFFERENTIAL METHOD BLD: NORMAL
EOSINOPHIL # BLD AUTO: 0.1 10E9/L (ref 0–0.7)
EOSINOPHIL NFR BLD AUTO: 2 %
ERYTHROCYTE [DISTWIDTH] IN BLOOD BY AUTOMATED COUNT: 12.4 % (ref 10–15)
GFR SERPL CREATININE-BSD FRML MDRD: 70 ML/MIN/{1.73_M2}
GLUCOSE SERPL-MCNC: 89 MG/DL (ref 70–105)
HCT VFR BLD AUTO: 44.3 % (ref 40–53)
HDLC SERPL-MCNC: 70 MG/DL (ref 23–92)
HGB BLD-MCNC: 14.8 G/DL (ref 13.3–17.7)
IMM GRANULOCYTES # BLD: 0 10E9/L (ref 0–0.4)
IMM GRANULOCYTES NFR BLD: 0.2 %
LDLC SERPL CALC-MCNC: 89 MG/DL
LYMPHOCYTES # BLD AUTO: 0.9 10E9/L (ref 0.8–5.3)
LYMPHOCYTES NFR BLD AUTO: 15.1 %
MCH RBC QN AUTO: 31.4 PG (ref 26.5–33)
MCHC RBC AUTO-ENTMCNC: 33.4 G/DL (ref 31.5–36.5)
MCV RBC AUTO: 94 FL (ref 78–100)
MONOCYTES # BLD AUTO: 0.6 10E9/L (ref 0–1.3)
MONOCYTES NFR BLD AUTO: 10.3 %
NEUTROPHILS # BLD AUTO: 4.1 10E9/L (ref 1.6–8.3)
NEUTROPHILS NFR BLD AUTO: 71.9 %
NONHDLC SERPL-MCNC: 112 MG/DL
PLATELET # BLD AUTO: 190 10E9/L (ref 150–450)
POTASSIUM SERPL-SCNC: 4.2 MMOL/L (ref 3.5–5.1)
PROT SERPL-MCNC: 7.3 G/DL (ref 6.4–8.9)
PSA SERPL-MCNC: 7.57 NG/ML
RBC # BLD AUTO: 4.72 10E12/L (ref 4.4–5.9)
SODIUM SERPL-SCNC: 140 MMOL/L (ref 134–144)
TRIGL SERPL-MCNC: 117 MG/DL
WBC # BLD AUTO: 5.6 10E9/L (ref 4–11)

## 2020-08-03 PROCEDURE — G0463 HOSPITAL OUTPT CLINIC VISIT: HCPCS | Mod: 25

## 2020-08-03 PROCEDURE — 99214 OFFICE O/P EST MOD 30 MIN: CPT | Mod: 25 | Performed by: FAMILY MEDICINE

## 2020-08-03 PROCEDURE — 90715 TDAP VACCINE 7 YRS/> IM: CPT | Mod: GY

## 2020-08-03 PROCEDURE — 80053 COMPREHEN METABOLIC PANEL: CPT | Mod: ZL | Performed by: FAMILY MEDICINE

## 2020-08-03 PROCEDURE — 84153 ASSAY OF PSA TOTAL: CPT | Mod: ZL | Performed by: FAMILY MEDICINE

## 2020-08-03 PROCEDURE — G0439 PPPS, SUBSEQ VISIT: HCPCS | Performed by: FAMILY MEDICINE

## 2020-08-03 PROCEDURE — 36415 COLL VENOUS BLD VENIPUNCTURE: CPT | Mod: ZL | Performed by: FAMILY MEDICINE

## 2020-08-03 PROCEDURE — 90471 IMMUNIZATION ADMIN: CPT

## 2020-08-03 PROCEDURE — G0463 HOSPITAL OUTPT CLINIC VISIT: HCPCS

## 2020-08-03 PROCEDURE — 85025 COMPLETE CBC W/AUTO DIFF WBC: CPT | Mod: ZL | Performed by: FAMILY MEDICINE

## 2020-08-03 PROCEDURE — 80061 LIPID PANEL: CPT | Mod: ZL | Performed by: FAMILY MEDICINE

## 2020-08-03 RX ORDER — TAMSULOSIN HYDROCHLORIDE 0.4 MG/1
0.8 CAPSULE ORAL
Qty: 180 CAPSULE | Refills: 3 | Status: SHIPPED | OUTPATIENT
Start: 2020-08-03 | End: 2021-05-04

## 2020-08-03 RX ORDER — LISINOPRIL 40 MG/1
TABLET ORAL
Qty: 90 TABLET | Refills: 4 | Status: SHIPPED | OUTPATIENT
Start: 2020-08-03 | End: 2020-09-08

## 2020-08-03 RX ORDER — HYDROCHLOROTHIAZIDE 12.5 MG/1
TABLET ORAL
Qty: 90 TABLET | Refills: 4 | Status: SHIPPED | OUTPATIENT
Start: 2020-08-03 | End: 2020-09-08

## 2020-08-03 RX ORDER — ROSUVASTATIN CALCIUM 20 MG/1
20 TABLET, COATED ORAL AT BEDTIME
Qty: 90 TABLET | Refills: 4 | Status: SHIPPED | OUTPATIENT
Start: 2020-08-03 | End: 2020-09-08

## 2020-08-03 SDOH — HEALTH STABILITY: MENTAL HEALTH: HOW OFTEN DO YOU HAVE A DRINK CONTAINING ALCOHOL?: 2-3 TIMES A WEEK

## 2020-08-03 SDOH — HEALTH STABILITY: MENTAL HEALTH: HOW MANY STANDARD DRINKS CONTAINING ALCOHOL DO YOU HAVE ON A TYPICAL DAY?: 1 OR 2

## 2020-08-03 ASSESSMENT — PATIENT HEALTH QUESTIONNAIRE - PHQ9: 5. POOR APPETITE OR OVEREATING: NOT AT ALL

## 2020-08-03 ASSESSMENT — ANXIETY QUESTIONNAIRES
1. FEELING NERVOUS, ANXIOUS, OR ON EDGE: NOT AT ALL
3. WORRYING TOO MUCH ABOUT DIFFERENT THINGS: NOT AT ALL
GAD7 TOTAL SCORE: 0
5. BEING SO RESTLESS THAT IT IS HARD TO SIT STILL: NOT AT ALL
7. FEELING AFRAID AS IF SOMETHING AWFUL MIGHT HAPPEN: NOT AT ALL
2. NOT BEING ABLE TO STOP OR CONTROL WORRYING: NOT AT ALL
6. BECOMING EASILY ANNOYED OR IRRITABLE: NOT AT ALL

## 2020-08-03 ASSESSMENT — PAIN SCALES - GENERAL: PAINLEVEL: NO PAIN (0)

## 2020-08-03 ASSESSMENT — MIFFLIN-ST. JEOR: SCORE: 1557.22

## 2020-08-03 NOTE — PATIENT INSTRUCTIONS
Patient Education   Personalized Prevention Plan  You are due for the preventive services outlined below.  Your care team is available to assist you in scheduling these services.  If you have already completed any of these items, please share that information with your care team to update in your medical record.  Health Maintenance Due   Topic Date Due     Discuss Advance Care Planning  1945     Annual Wellness Visit  10/31/2010     AORTIC ANEURYSM SCREENING (SYSTEM ASSIGNED)  10/31/2010     FALL RISK ASSESSMENT  07/31/2019     PHQ-2  01/01/2020     Diptheria Tetanus Pertussis (DTAP/TDAP/TD) Vaccine (2 - Td) 03/24/2020

## 2020-08-03 NOTE — PROGRESS NOTES
"  SUBJECTIVE:   Chao Kearney is a 74 year old male who presents for Preventive Visit.      Are you in the first 12 months of your Medicare Part B coverage?  No    Physical Health:    In general, how would you rate your overall physical health? excellent    Outside of work, how many days during the week do you exercise? 6-7 days/week    Outside of work, approximately how many minutes a day do you exercise?45-60 minutes    If you drink alcohol do you typically have >3 drinks per day or >7 drinks per week? No    Do you usually eat at least 4 servings of fruit and vegetables a day, include whole grains & fiber and avoid regularly eating high fat or \"junk\" foods? NO    Do you have any problems taking medications regularly?  No    Do you have any side effects from medications? none    Needs assistance for the following daily activities: no assistance needed    Which of the following safety concerns are present in your home?  none identified     Hearing impairment: No    In the past 6 months, have you been bothered by leaking of urine? no    Mental Health:    In general, how would you rate your overall mental or emotional health? excellent  PHQ-2 Score: 0    Do you feel safe in your environment? Yes    Have you ever done Advance Care Planning? (For example, a Health Directive, POLST, or a discussion with a medical provider or your loved ones about your wishes): No, advance care planning information given to patient to review.  Patient plans to discuss their wishes with loved ones or provider.      Additional concerns to address?      Fall risk:  Fallen 2 or more times in the past year?: No  Any fall with injury in the past year?: No    Cognitive Screenin) Repeat 3 items (Leader, Season, Table)    2) Clock draw: Normal  3) 3 item recall: Recalls NO objects   Results: 0 items recalled: PROBABLE COGNITIVE IMPAIRMENT, **INFORM PROVIDER**    Mini-CogTM Copyright S [employer]. Licensed by the author for use in [facility] " "[facility]; reprinted with permission (soob@[facility]). All rights reserved.      Do you have sleep apnea, excessive snoring or daytime drowsiness?: no        Reviewed and updated as needed this visit by clinical staff  Tobacco  Allergies  Meds  Med Hx  Surg Hx  Fam Hx  Soc Hx        Reviewed and updated as needed this visit by Provider        Social History     Tobacco Use     Smoking status: Former Smoker     Smokeless tobacco: Never Used   Substance Use Topics     Alcohol use: Yes     Alcohol/week: 2.0 standard drinks     Types: 2 Cans of beer per week     Frequency: 2-3 times a week     Drinks per session: 1 or 2     Comment: 2-3 times/week                           Current providers sharing in care for this patient include:   Patient Care Team:  Chan Cook MD as PCP - General (Family Practice)  Chan Cook MD as Assigned PCP    The following health maintenance items are reviewed in Epic and correct as of today:  Health Maintenance   Topic Date Due     ADVANCE CARE PLANNING  1945     MEDICARE ANNUAL WELLNESS VISIT  10/31/2010     AORTIC ANEURYSM SCREENING (SYSTEM ASSIGNED)  10/31/2010     FALL RISK ASSESSMENT  07/31/2019     PHQ-2  01/01/2020     DTAP/TDAP/TD IMMUNIZATION (2 - Td) 03/24/2020     INFLUENZA VACCINE (1) 09/01/2020     LIPID  08/06/2024     COLORECTAL CANCER SCREENING  10/15/2025     PNEUMOCOCCAL IMMUNIZATION 65+ LOW/MEDIUM RISK  Completed     ZOSTER IMMUNIZATION  Completed     IPV IMMUNIZATION  Aged Out     MENINGITIS IMMUNIZATION  Aged Out     HEPATITIS B IMMUNIZATION  Aged Out     HEPATITIS C SCREENING  Discontinued     Labs reviewed in EPIC      ROS:  See below    OBJECTIVE:   /74 (BP Location: Right arm, Patient Position: Sitting, Cuff Size: Adult Regular)   Pulse 71   Temp 97.3  F (36.3  C) (Tympanic)   Resp 17   Ht 1.73 m (5' 8.11\")   Wt 84.1 kg (185 lb 6.4 oz)   SpO2 98%   BMI 28.10 kg/m   Estimated body mass index is 28.1 kg/m  as calculated from the " "following:    Height as of this encounter: 1.73 m (5' 8.11\").    Weight as of this encounter: 84.1 kg (185 lb 6.4 oz).  EXAM:   See below    Diagnostic Test Results:  Labs reviewed in Epic    ASSESSMENT / PLAN:   Chao was seen today for medicare visit.    Diagnoses and all orders for this visit:    Encounter for Medicare annual wellness exam    Essential hypertension  -     CBC with platelets differential; Future  -     Comprehensive metabolic panel; Future  -     Comprehensive metabolic panel  -     CBC with platelets differential    Brain injury with loss of consciousness, sequela (H)  -     CBC with platelets differential; Future  -     Comprehensive metabolic panel; Future  -     Comprehensive metabolic panel  -     CBC with platelets differential    Elevated prostate specific antigen (PSA)  -     Prostate Specific Antigen GH; Future  -     Prostate Specific Antigen GH    Benign non-nodular prostatic hyperplasia with lower urinary tract symptoms  -     tamsulosin (FLOMAX) 0.4 MG capsule; Take 2 capsules (0.8 mg) by mouth daily with food    Generalized anxiety disorder    Mixed hyperlipidemia  -     Comprehensive metabolic panel; Future  -     Lipid Profile; Future  -     Lipid Profile  -     Comprehensive metabolic panel    Prostate disorder  -     Prostate Specific Antigen GH; Future  -     Prostate Specific Antigen GH    Nonvenomous insect bite of multiple sites, initial encounter  -     GH IMM-  TDAP VACCINE (BOOSTRIX )    Essential hypertension, benign  -     hydrochlorothiazide (HYDRODIURIL) 12.5 MG tablet; TAKE 1 TABLET EVERY DAY  -     lisinopril (ZESTRIL) 40 MG tablet; TAKE 1 TABLET EVERY DAY    Hyperlipidemia, unspecified hyperlipidemia type  -     rosuvastatin (CRESTOR) 20 MG tablet; Take 1 tablet (20 mg) by mouth At Bedtime        COUNSELING:  Reviewed preventive health counseling, as reflected in patient instructions       Regular exercise       Healthy diet/nutrition    Estimated body mass index is " "28.1 kg/m  as calculated from the following:    Height as of this encounter: 1.73 m (5' 8.11\").    Weight as of this encounter: 84.1 kg (185 lb 6.4 oz).         reports that he has quit smoking. He has never used smokeless tobacco.      Appropriate preventive services were discussed with this patient, including applicable screening as appropriate for cardiovascular disease, diabetes, osteopenia/osteoporosis, and glaucoma.  As appropriate for age/gender, discussed screening for colorectal cancer, prostate cancer, breast cancer, and cervical cancer. Checklist reviewing preventive services available has been given to the patient.    Reviewed patients plan of care and provided an AVS. The Basic Care Plan (routine screening as documented in Health Maintenance) for Chao meets the Care Plan requirement. This Care Plan has been established and reviewed with the Patient.    Counseling Resources:  ATP IV Guidelines  Pooled Cohorts Equation Calculator  Breast Cancer Risk Calculator  FRAX Risk Assessment  ICSI Preventive Guidelines  Dietary Guidelines for Americans, 2010  USDA's MyPlate  ASA Prophylaxis  Lung CA Screening    Chan Cook MD  [facility]  "

## 2020-08-03 NOTE — PROGRESS NOTES
Nursing Notes:   Mandy Mcfarlane LPN  8/3/2020 11:04 AM  Signed  Chief Complaint   Patient presents with     Medicare Visit     annual    Immunization Documentation    Prior to Immunization administration, verified patients identity using patient's name and date of birth. Please see IMMUNIZATIONS  and order for additional information.  Patient / Parent instructed to remain in clinic for 15 minutes and report any adverse reaction to staff immediately.    Was entire vial of medication used? Yes  Vial/Syringe: Syringe    Mandy Mcfarlane LPN  8/3/2020   11:03 AM      SUBJECTIVE:  Chao Kearney  is a 74 year old male who comes in for Medicare Wellness and follow up of his medical problems.  He has been very isolated.      He has had no change in his mental status or neurologic condition after his brain injury.  He misses going to work out daily at the [facility].  He is staying active.  He still has poor short-term memory but that is really only sequela of his injury.    He is in need of TDAP for multiple insect bites as he is due.     He continues on aspirin, HCTZ and lisinopril for hypertension after his brain injury. He is also on Crestor.    He has had elevated PSA which we have been following. He last had biopsy in 2016 that was benign with some hyperplasia without malignancy. He is up to date on colonoscopy.     He has been fishing a fair amount. He has been catching sunfish.     Past Medical, Family, and Social History reviewed and updated as noted below.   ROS is negative except as noted above       No Known Allergies,   Family History   Problem Relation Age of Onset     Heart Disease Father 50        Heart Disease,heart failure     Other - See Comments Father          MS, long-standing     Cancer Mother 78        Cancer, lung cancer at age 78, developed brain metastases     Cancer Paternal Grandfather         Cancer, of cancer of unknown type     Other - See Comments Sister          MS   ,   Current  Outpatient Medications   Medication     aspirin EC 81 MG EC tablet     Cholecalciferol (VITAMIN D3) 1000 UNITS CAPS     hydrochlorothiazide (HYDRODIURIL) 12.5 MG tablet     lisinopril (ZESTRIL) 40 MG tablet     meclizine (ANTIVERT) 12.5 MG tablet     Multiple Vitamins-Minerals (MULTILEX-T&M) TABS     rosuvastatin (CRESTOR) 20 MG tablet     sildenafil (VIAGRA) 100 MG tablet     tamsulosin (FLOMAX) 0.4 MG capsule     triamcinolone (KENALOG) 0.1 % external cream     No current facility-administered medications for this visit.    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 Marin 1/24/05     Nontraumatic subarachnoid hemorrhage (H)     No Comments Provided     Osteoarthritis of hip     No Comments Provided     Other seborrheic keratosis     No Comments Provided     Other specified disorders of bone, unspecified site (CODE)     1/05,Lingual sean, bony growth, lower mandible, evaluation Dr. Marin 1/05     Pain in right hip     No Comments Provided     Personal history of other medical treatment (CODE)     05/16/05,Q wave in 3 and AVF leads on electrocardiogram done     Plantar fascial fibromatosis     No Comments Provided     Retention of urine     No Comments Provided     Screening for other and unspecified cardiovascular conditions      05/20/2005,Stress echocardiogram is negative for ischemia with ejection fraction of 85%     Stiffness of unspecified joint, not elsewhere classified     No Comments Provided   ,   Patient Active Problem List    Diagnosis Date Noted     Generalized anxiety disorder 01/17/2018     Priority: Medium     Elevated prostate specific antigen (PSA) 01/17/2018     Priority: Medium     Overview:   in October 2005.  The patient is taking Levaquin daily for two months.  He   will have another PSA checked in October 2006, recommended by Dr. iWlson.       Hyperlipidemia 01/17/2018     Priority: Medium     Hypertension 01/17/2018     Priority: Medium     Plantar fasciitis 01/17/2018     Priority: Medium     Overview:   right       Benign non-nodular prostatic hyperplasia with lower urinary tract symptoms 07/27/2016     Priority: Medium     Brain injury (H) 01/16/2015     Priority: Medium     Retention of urine 12/12/2014     Priority: Medium     Right hip pain 10/10/2014     Priority: Medium     Anemia 09/22/2014     Priority: Medium     Hydrocephalus (H) 09/22/2014     Priority: Medium     SAH (subarachnoid hemorrhage) (H) 09/22/2014     Priority: Medium     Actinic keratosis 01/28/2014     Priority: Medium     Seborrheic keratosis 01/28/2014     Priority: Medium     H/O adenomatous polyp of colon 04/27/2010     Priority: Medium     Diverticulosis of sigmoid colon 04/26/2010     Priority: Medium   ,   Past Surgical History:   Procedure Laterality Date     COLONOSCOPY  04/26/2010    (04/26/2010),F/U 2015     COLONOSCOPY  10/15/2015    10/15/15,F/U 2025     EXTRACAPSULAR CATARACT EXTRATION WITH INTRAOCULAR LENS IMPLANT      2016     OTHER SURGICAL HISTORY      3/26/14,85665.0,NE TOTAL HIP ARTHROPLASTY,Left,direct anterior approach, Dr. Lama     OTHER SURGICAL HISTORY      2014,[id number],CRANIOTOMY     OTHER SURGICAL HISTORY      11/29/2016,,HERNIA REPAIR,Left,LIH with mesh    and   Social History     Tobacco Use     Smoking  "status: Former Smoker     Smokeless tobacco: Never Used   Substance Use Topics     Alcohol use: Yes     Alcohol/week: 2.0 standard drinks     Types: 2 Cans of beer per week     Frequency: 2-3 times a week     Drinks per session: 1 or 2     Comment: 2-3 times/week     OBJECTIVE:  /74 (BP Location: Right arm, Patient Position: Sitting, Cuff Size: Adult Regular)   Pulse 71   Temp 97.3  F (36.3  C) (Tympanic)   Resp 17   Ht 1.73 m (5' 8.11\")   Wt 84.1 kg (185 lb 6.4 oz)   SpO2 98%   BMI 28.10 kg/m     EXAM:  General Appearance: Pleasant, alert, appropriate appearance for age. No acute distress  Head Exam: Normal. Normocephalic, atraumatic.  Eye Exam:  Normal external eyes, conjunctivae, lids, cornea. MIDNY. EOMI  Ear Exam: Normal TM's bilaterally. Normal auditory canals and external ears. Non-tender.  Nose Exam: Normal external nose, mucus membranes, and septum.  OroPharynx Exam:  Dental hygiene adequate. Normal buccal mucosa. Normal pharynx.  Neck Exam:  Supple, no masses or nodes. No audible bruits  Thyroid Exam: No nodules or enlargement.  Chest/Respiratory Exam: Normal chest wall and respirations. Clear to auscultation.  Cardiovascular Exam: Regular rate and rhythm. S1, S2, no murmur, click, gallop, or rubs.  Gastrointestinal Exam: Soft, non-tender, no masses or organomegaly.  Lymphatic Exam: Non-palpable nodes in neck, clavicular regions.  Musculoskeletal Exam: Back is straight and non-tender, full ROM of upper and lower extremities.  Foot Exam: Left and right foot: good pedal pulses  Skin: no rash or abnormalities  Neurologic Exam: Nonfocal, normal gross motor, tone coordination and no tremor.  Psychiatric Exam: Alert and oriented - appropriate affect.     Results for orders placed or performed in visit on 08/03/20   Prostate Specific Antigen GH     Status: Abnormal   Result Value Ref Range    Prostate Specific Antigen 7.572 (H) <3.100 ng/mL   Lipid Profile     Status: None   Result Value Ref Range    " Cholesterol 182 <200 mg/dL    Triglycerides 117 <150 mg/dL    HDL Cholesterol 70 23 - 92 mg/dL    LDL Cholesterol Calculated 89 <100 mg/dL    Non HDL Cholesterol 112 <130 mg/dL   Comprehensive metabolic panel     Status: None   Result Value Ref Range    Sodium 140 134 - 144 mmol/L    Potassium 4.2 3.5 - 5.1 mmol/L    Chloride 104 98 - 107 mmol/L    Carbon Dioxide 31 21 - 31 mmol/L    Anion Gap 5 3 - 14 mmol/L    Glucose 89 70 - 105 mg/dL    Urea Nitrogen 21 7 - 25 mg/dL    Creatinine 1.04 0.70 - 1.30 mg/dL    GFR Estimate 70 >60 mL/min/[1.73_m2]    GFR Estimate If Black 84 >60 mL/min/[1.73_m2]    Calcium 9.6 8.6 - 10.3 mg/dL    Bilirubin Total 0.4 0.3 - 1.0 mg/dL    Albumin 4.4 3.5 - 5.7 g/dL    Protein Total 7.3 6.4 - 8.9 g/dL    Alkaline Phosphatase 42 34 - 104 U/L    ALT 14 7 - 52 U/L    AST 21 13 - 39 U/L   CBC with platelets differential     Status: None   Result Value Ref Range    WBC 5.6 4.0 - 11.0 10e9/L    RBC Count 4.72 4.4 - 5.9 10e12/L    Hemoglobin 14.8 13.3 - 17.7 g/dL    Hematocrit 44.3 40.0 - 53.0 %    MCV 94 78 - 100 fl    MCH 31.4 26.5 - 33.0 pg    MCHC 33.4 31.5 - 36.5 g/dL    RDW 12.4 10.0 - 15.0 %    Platelet Count 190 150 - 450 10e9/L    Diff Method Automated Method     % Neutrophils 71.9 %    % Lymphocytes 15.1 %    % Monocytes 10.3 %    % Eosinophils 2.0 %    % Basophils 0.5 %    % Immature Granulocytes 0.2 %    Absolute Neutrophil 4.1 1.6 - 8.3 10e9/L    Absolute Lymphocytes 0.9 0.8 - 5.3 10e9/L    Absolute Monocytes 0.6 0.0 - 1.3 10e9/L    Absolute Eosinophils 0.1 0.0 - 0.7 10e9/L    Absolute Basophils 0.0 0.0 - 0.2 10e9/L    Abs Immature Granulocytes 0.0 0 - 0.4 10e9/L      ASSESSMENT/Plan :    Chao was seen today for medicare visit.    Diagnoses and all orders for this visit:    Encounter for Medicare annual wellness exam    Essential hypertension  -     CBC with platelets differential; Future  -     Comprehensive metabolic panel; Future  -     Comprehensive metabolic panel  -     CBC  with platelets differential    Brain injury with loss of consciousness, sequela (H)  -     CBC with platelets differential; Future  -     Comprehensive metabolic panel; Future  -     Comprehensive metabolic panel  -     CBC with platelets differential    Elevated prostate specific antigen (PSA)  -     Prostate Specific Antigen GH; Future  -     Prostate Specific Antigen GH    Benign non-nodular prostatic hyperplasia with lower urinary tract symptoms  -     tamsulosin (FLOMAX) 0.4 MG capsule; Take 2 capsules (0.8 mg) by mouth daily with food    Generalized anxiety disorder    Mixed hyperlipidemia  -     Comprehensive metabolic panel; Future  -     Lipid Profile; Future  -     Lipid Profile  -     Comprehensive metabolic panel    Prostate disorder  -     Prostate Specific Antigen GH; Future  -     Prostate Specific Antigen GH    Nonvenomous insect bite of multiple sites, initial encounter  -     GH IMM-  TDAP VACCINE (BOOSTRIX )    Essential hypertension, benign  -     hydrochlorothiazide (HYDRODIURIL) 12.5 MG tablet; TAKE 1 TABLET EVERY DAY  -     lisinopril (ZESTRIL) 40 MG tablet; TAKE 1 TABLET EVERY DAY    Hyperlipidemia, unspecified hyperlipidemia type  -     rosuvastatin (CRESTOR) 20 MG tablet; Take 1 tablet (20 mg) by mouth At Bedtime      Will notify of lab results when available. Continue current medications. TDAP today.  PSA is stable.    A total of 25 minutes was spent with the patient, greater than 50% of the time was spent in counseling/discussion of the aforementioned concerns in addition to Medicare Wellness.     Chan Cook MD

## 2020-08-03 NOTE — NURSING NOTE
Chief Complaint   Patient presents with     Medicare Visit     annual    Immunization Documentation    Prior to Immunization administration, verified patients identity using patient's name and date of birth. Please see IMMUNIZATIONS  and order for additional information.  Patient / Parent instructed to remain in clinic for 15 minutes and report any adverse reaction to staff immediately.    Was entire vial of medication used? Yes  Vial/Syringe: Syringe    Mandy Mcfarlane LPN  8/3/2020   11:03 AM

## 2020-08-04 ASSESSMENT — ANXIETY QUESTIONNAIRES: GAD7 TOTAL SCORE: 0

## 2020-09-04 DIAGNOSIS — I10 ESSENTIAL HYPERTENSION, BENIGN: ICD-10-CM

## 2020-09-04 DIAGNOSIS — E78.5 HYPERLIPIDEMIA, UNSPECIFIED HYPERLIPIDEMIA TYPE: ICD-10-CM

## 2020-09-08 RX ORDER — LISINOPRIL 40 MG/1
TABLET ORAL
Qty: 90 TABLET | Refills: 3 | Status: SHIPPED | OUTPATIENT
Start: 2020-08-03 | End: 2021-09-21

## 2020-09-08 RX ORDER — HYDROCHLOROTHIAZIDE 12.5 MG/1
TABLET ORAL
Qty: 90 TABLET | Refills: 3 | Status: SHIPPED | OUTPATIENT
Start: 2020-08-03 | End: 2021-09-21

## 2020-09-08 RX ORDER — ROSUVASTATIN CALCIUM 20 MG/1
TABLET, COATED ORAL
Qty: 90 TABLET | Refills: 3 | Status: SHIPPED | OUTPATIENT
Start: 2020-08-03 | End: 2021-09-21

## 2020-09-08 NOTE — TELEPHONE ENCOUNTER
Disp  Refills  Start  End  ABAD    hydrochlorothiazide (HYDRODIURIL) 12.5 MG tablet  90 tablet  4  8/3/2020   No    Sig: TAKE 1 TABLET EVERY DAY       Disp  Refills  Start  End  ABAD    lisinopril (ZESTRIL) 40 MG tablet  90 tablet  4  8/3/2020   No    Sig: TAKE 1 TABLET EVERY DAY       Disp  Refills  Start  End  ABAD    rosuvastatin (CRESTOR) 20 MG tablet  90 tablet  4  8/3/2020   No    Sig - Route: Take 1 tablet (20 mg) by mouth At Bedtime - Oral      [facility].     Letty Greene RN  ....................  9/8/2020   4:18 PM

## 2020-10-20 ENCOUNTER — HOSPITAL ENCOUNTER (OUTPATIENT)
Dept: CT IMAGING | Facility: OTHER | Age: 75
End: 2020-10-20
Attending: FAMILY MEDICINE
Payer: MEDICARE

## 2020-10-20 ENCOUNTER — OFFICE VISIT (OUTPATIENT)
Dept: FAMILY MEDICINE | Facility: OTHER | Age: 75
End: 2020-10-20
Attending: FAMILY MEDICINE
Payer: MEDICARE

## 2020-10-20 VITALS
RESPIRATION RATE: 16 BRPM | WEIGHT: 185.13 LBS | HEIGHT: 68 IN | BODY MASS INDEX: 28.06 KG/M2 | DIASTOLIC BLOOD PRESSURE: 68 MMHG | TEMPERATURE: 97 F | HEART RATE: 56 BPM | SYSTOLIC BLOOD PRESSURE: 134 MMHG | OXYGEN SATURATION: 97 %

## 2020-10-20 DIAGNOSIS — R42 DIZZINESS: ICD-10-CM

## 2020-10-20 DIAGNOSIS — Z13.29 SCREENING FOR THYROID DISORDER: ICD-10-CM

## 2020-10-20 DIAGNOSIS — R42 DIZZINESS: Primary | ICD-10-CM

## 2020-10-20 LAB
ALBUMIN SERPL-MCNC: 4.7 G/DL (ref 3.5–5.7)
ALP SERPL-CCNC: 48 U/L (ref 34–104)
ALT SERPL W P-5'-P-CCNC: 13 U/L (ref 7–52)
ANION GAP SERPL CALCULATED.3IONS-SCNC: 9 MMOL/L (ref 3–14)
AST SERPL W P-5'-P-CCNC: 23 U/L (ref 13–39)
BASOPHILS # BLD AUTO: 0 10E9/L (ref 0–0.2)
BASOPHILS NFR BLD AUTO: 0.6 %
BILIRUB SERPL-MCNC: 0.6 MG/DL (ref 0.3–1)
BUN SERPL-MCNC: 23 MG/DL (ref 7–25)
CALCIUM SERPL-MCNC: 10 MG/DL (ref 8.6–10.3)
CHLORIDE SERPL-SCNC: 104 MMOL/L (ref 98–107)
CO2 SERPL-SCNC: 29 MMOL/L (ref 21–31)
CREAT SERPL-MCNC: 1.11 MG/DL (ref 0.7–1.3)
DIFFERENTIAL METHOD BLD: NORMAL
EOSINOPHIL # BLD AUTO: 0.1 10E9/L (ref 0–0.7)
EOSINOPHIL NFR BLD AUTO: 0.8 %
ERYTHROCYTE [DISTWIDTH] IN BLOOD BY AUTOMATED COUNT: 12.2 % (ref 10–15)
GFR SERPL CREATININE-BSD FRML MDRD: 65 ML/MIN/{1.73_M2}
GLUCOSE SERPL-MCNC: 126 MG/DL (ref 70–105)
HCT VFR BLD AUTO: 43.7 % (ref 40–53)
HGB BLD-MCNC: 14.7 G/DL (ref 13.3–17.7)
IMM GRANULOCYTES # BLD: 0 10E9/L (ref 0–0.4)
IMM GRANULOCYTES NFR BLD: 0.2 %
LYMPHOCYTES # BLD AUTO: 0.8 10E9/L (ref 0.8–5.3)
LYMPHOCYTES NFR BLD AUTO: 12.6 %
MCH RBC QN AUTO: 31.4 PG (ref 26.5–33)
MCHC RBC AUTO-ENTMCNC: 33.6 G/DL (ref 31.5–36.5)
MCV RBC AUTO: 93 FL (ref 78–100)
MONOCYTES # BLD AUTO: 0.6 10E9/L (ref 0–1.3)
MONOCYTES NFR BLD AUTO: 8.7 %
NEUTROPHILS # BLD AUTO: 5.1 10E9/L (ref 1.6–8.3)
NEUTROPHILS NFR BLD AUTO: 77.1 %
PLATELET # BLD AUTO: 204 10E9/L (ref 150–450)
POTASSIUM SERPL-SCNC: 4.3 MMOL/L (ref 3.5–5.1)
PROT SERPL-MCNC: 7.3 G/DL (ref 6.4–8.9)
RBC # BLD AUTO: 4.68 10E12/L (ref 4.4–5.9)
SODIUM SERPL-SCNC: 142 MMOL/L (ref 134–144)
TROPONIN I SERPL-MCNC: 5.2 PG/ML
TSH SERPL DL<=0.05 MIU/L-ACNC: 2.16 IU/ML (ref 0.34–5.6)
WBC # BLD AUTO: 6.7 10E9/L (ref 4–11)

## 2020-10-20 PROCEDURE — 70450 CT HEAD/BRAIN W/O DYE: CPT

## 2020-10-20 PROCEDURE — 36415 COLL VENOUS BLD VENIPUNCTURE: CPT | Mod: ZL | Performed by: FAMILY MEDICINE

## 2020-10-20 PROCEDURE — 85025 COMPLETE CBC W/AUTO DIFF WBC: CPT | Mod: ZL | Performed by: FAMILY MEDICINE

## 2020-10-20 PROCEDURE — 80053 COMPREHEN METABOLIC PANEL: CPT | Mod: ZL | Performed by: FAMILY MEDICINE

## 2020-10-20 PROCEDURE — 93010 ELECTROCARDIOGRAM REPORT: CPT | Performed by: INTERNAL MEDICINE

## 2020-10-20 PROCEDURE — 84484 ASSAY OF TROPONIN QUANT: CPT | Mod: ZL | Performed by: FAMILY MEDICINE

## 2020-10-20 PROCEDURE — 99214 OFFICE O/P EST MOD 30 MIN: CPT | Performed by: FAMILY MEDICINE

## 2020-10-20 PROCEDURE — 84443 ASSAY THYROID STIM HORMONE: CPT | Mod: ZL,GZ | Performed by: FAMILY MEDICINE

## 2020-10-20 PROCEDURE — 93005 ELECTROCARDIOGRAM TRACING: CPT

## 2020-10-20 PROCEDURE — G0463 HOSPITAL OUTPT CLINIC VISIT: HCPCS | Mod: 25

## 2020-10-20 ASSESSMENT — ENCOUNTER SYMPTOMS
NERVOUS/ANXIOUS: 0
SHORTNESS OF BREATH: 0
WEAKNESS: 0
FACIAL ASYMMETRY: 0
CHEST TIGHTNESS: 0
DIZZINESS: 1
FEVER: 0
NUMBNESS: 0
SPEECH DIFFICULTY: 0
COUGH: 0
PARESTHESIAS: 0
FATIGUE: 1

## 2020-10-20 ASSESSMENT — PAIN SCALES - GENERAL: PAINLEVEL: NO PAIN (0)

## 2020-10-20 ASSESSMENT — MIFFLIN-ST. JEOR: SCORE: 1554.22

## 2020-10-20 NOTE — PROGRESS NOTES
SUBJECTIVE:   Nursing Notes:   Martine Laurent LPN  10/20/2020 10:46 AM  Sign at exiting of workspace  Patient presents to clinic experiencing dizziness and unsteady feeling.  Medication Reconciliation: complete    NADER Higginbotham Caio is a 74 year old male who presents to clinic today for dizziness and lightheadedness.  He has a history of subarachnoid hemorrhage in the past.  The dizziness doesn't bother him when he is outside.  He notices it only when inside.  Their carbon monoxide detector was old, so they got a new one and it was reading higher than it should.  They have electric off-peak heating and also a gas furnace.  His wife has not had any symptoms.  He has been more tired lately than normal.  They are going to have the fire department check their furnace for them today to make sure there are no issues with it.  His symptoms started about 3 days ago.  No chest pain shortness of breath.  No cough or fever.  Has not had a headache.  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    HPI    I personally reviewed medications/allergies/history listed below:    Patient Active Problem List    Diagnosis Date Noted     Generalized anxiety disorder 01/17/2018     Priority: Medium     Elevated prostate specific antigen (PSA) 01/17/2018     Priority: Medium     Overview:   in October 2005.  The patient is taking Levaquin daily for two months.  He   will have another PSA checked in October 2006, recommended by Dr. Wilson.       Hyperlipidemia 01/17/2018     Priority: Medium     Hypertension 01/17/2018     Priority: Medium     Plantar fasciitis 01/17/2018     Priority: Medium     Overview:   right       Benign non-nodular prostatic hyperplasia with lower urinary tract symptoms 07/27/2016     Priority: Medium     Brain injury (H) 01/16/2015     Priority: Medium     Retention of urine 12/12/2014     Priority: Medium     Right hip pain 10/10/2014     Priority: Medium      Anemia 09/22/2014     Priority: Medium     Hydrocephalus (H) 09/22/2014     Priority: Medium     SAH (subarachnoid hemorrhage) (H) 09/22/2014     Priority: Medium     Actinic keratosis 01/28/2014     Priority: Medium     Seborrheic keratosis 01/28/2014     Priority: Medium     H/O adenomatous polyp of colon 04/27/2010     Priority: Medium     Diverticulosis of sigmoid colon 04/26/2010     Priority: Medium     Past Medical History:   Diagnosis Date     Actinic keratosis     No Comments Provided     Anemia     No Comments Provided     Diverticulosis of large intestine without perforation or abscess without bleeding     No Comments Provided     Elevated prostate specific antigen (PSA)     No Comments Provided     Essential (primary) hypertension     No Comments Provided     Generalized anxiety disorder     No Comments Provided     History of colonic polyps     No Comments Provided     Hydrocephalus (H)     (obstructive)     Hyperlipidemia     No Comments Provided     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 Marin 1/24/05     Nontraumatic subarachnoid hemorrhage (H)     No Comments Provided     Osteoarthritis of hip     No Comments Provided     Other seborrheic keratosis     No Comments Provided     Other specified disorders of bone, unspecified site (CODE)     1/05,Lingual sean, bony growth, lower mandible, evaluation Dr. Marin 1/05     Pain in right hip     No Comments Provided     Personal history of other medical treatment (CODE)     05/16/05,Q wave in 3 and AVF leads on electrocardiogram done     Plantar fascial fibromatosis     No Comments Provided     Retention of urine     No Comments Provided     Screening for other and unspecified cardiovascular conditions     05/20/2005,Stress echocardiogram is negative for ischemia with ejection fraction of 85%     Stiffness of unspecified joint,  not elsewhere classified     No Comments Provided      Past Surgical History:   Procedure Laterality Date     COLONOSCOPY  2010    (2010),F/U      COLONOSCOPY  10/15/2015    10/15/15,F/U      EXTRACAPSULAR CATARACT EXTRATION WITH INTRAOCULAR LENS IMPLANT           OTHER SURGICAL HISTORY      3/26/14,15318.0,[place] TOTAL HIP ARTHROPLASTY,Left,direct anterior approach, Dr. Lama     OTHER SURGICAL HISTORY      ,[id number],CRANIOTOMY     OTHER SURGICAL HISTORY      2016,,HERNIA REPAIR,Left,LIH with mesh     Family History   Problem Relation Age of Onset     Heart Disease Father 50        Heart Disease,heart failure     Other - See Comments Father          MS, long-standing     Cancer Mother 78        Cancer, lung cancer at age 78, developed brain metastases     Cancer Paternal Grandfather         Cancer, of cancer of unknown type     Other - See Comments Sister          MS     Social History     Tobacco Use     Smoking status: Former Smoker     Smokeless tobacco: Never Used   Substance Use Topics     Alcohol use: Yes     Alcohol/week: 2.0 standard drinks     Types: 2 Cans of beer per week     Frequency: 2-3 times a week     Drinks per session: 1 or 2     Comment: 2-3 times/week     Social History     Social History Narrative    Lived in [place] for over 30 years.  He taught grade 4 and 6.    .  His wife grew up here and they built on family land here after he retired.      2 kids.    Andrez in [place].  Chana lives in [place].    Very active with walks, and daily stretches.     Current Outpatient Medications   Medication Sig Dispense Refill     aspirin EC 81 MG EC tablet Take 81 mg by mouth daily with food       Cholecalciferol (VITAMIN D3) 1000 UNITS CAPS Take 1 capsule by mouth daily       hydrochlorothiazide (HYDRODIURIL) 12.5 MG tablet TAKE 1 TABLET EVERY DAY 90 tablet 3     lisinopril (ZESTRIL) 40 MG tablet TAKE 1 TABLET EVERY DAY 90 tablet 3     Multiple  "Vitamins-Minerals (MULTILEX-T&M) TABS Take 1 tablet by mouth daily       rosuvastatin (CRESTOR) 20 MG tablet TAKE 1 TABLET AT BEDTIME 90 tablet 3     sildenafil (VIAGRA) 100 MG tablet Take 1 tablet by mouth as needed Take 30 min to 4 hours before sexual activity. Max 100mg/24hr 20 tablet prn     triamcinolone (KENALOG) 0.1 % external cream Apply topically to affected area(s) 3 times daily. 30 g 1     meclizine (ANTIVERT) 12.5 MG tablet Take 2 tablets (25 mg) by mouth 4 times daily as needed for dizziness 20 tablet 0     tamsulosin (FLOMAX) 0.4 MG capsule Take 2 capsules (0.8 mg) by mouth daily with food 180 capsule 3     No Known Allergies    Review of Systems   Constitutional: Positive for fatigue. Negative for fever.   Respiratory: Negative for cough, chest tightness and shortness of breath.    Cardiovascular: Negative for chest pain and peripheral edema.   Neurological: Positive for dizziness. Negative for facial asymmetry, speech difficulty, weakness, numbness and paresthesias.   Psychiatric/Behavioral: Negative for mood changes. The patient is not nervous/anxious.         OBJECTIVE:     /68 (BP Location: Right arm, Patient Position: Sitting, Cuff Size: Adult Regular)   Pulse 56   Temp 97  F (36.1  C) (Tympanic)   Resp 16   Ht 1.727 m (5' 8\")   Wt 84 kg (185 lb 2 oz)   SpO2 97%   BMI 28.15 kg/m    Body mass index is 28.15 kg/m .     Orthostatic Vitals from 10/18/20 1135 to 10/20/20 1135    Date and Time Orthostatic BP Orthostatic Pulse Patient Position BP   Location Cuff Size   10/20/20 1127 160/92 -- Standing Right arm Adult Regular   10/20/20 1122 144/88 -- Sitting Right arm Adult Regular   10/20/20 1118 156/86 -- Supine Right arm Adult Regular           Physical Exam  Constitutional:       General: He is not in acute distress.     Appearance: Normal appearance. He is normal weight. He is not ill-appearing, toxic-appearing or diaphoretic.   HENT:      Head: Normocephalic.      Right Ear: Tympanic " membrane normal.      Left Ear: Tympanic membrane normal.      Nose: Nose normal.      Mouth/Throat:      Mouth: Mucous membranes are moist.      Pharynx: Oropharynx is clear. No oropharyngeal exudate or posterior oropharyngeal erythema.   Eyes:      Extraocular Movements: Extraocular movements intact.      Pupils: Pupils are equal, round, and reactive to light.   Neck:      Musculoskeletal: Normal range of motion and neck supple. No muscular tenderness.      Vascular: No carotid bruit.   Cardiovascular:      Rate and Rhythm: Normal rate and regular rhythm.      Pulses: Normal pulses.      Heart sounds: No murmur.   Pulmonary:      Effort: Pulmonary effort is normal.      Breath sounds: Normal breath sounds. No wheezing, rhonchi or rales.   Abdominal:      General: Abdomen is flat. Bowel sounds are normal.      Palpations: Abdomen is soft. There is no mass.      Tenderness: There is no abdominal tenderness. There is no guarding or rebound.   Musculoskeletal:         General: No swelling.      Right lower leg: No edema.      Left lower leg: No edema.   Lymphadenopathy:      Cervical: No cervical adenopathy.   Skin:     General: Skin is warm and dry.      Capillary Refill: Capillary refill takes less than 2 seconds.   Neurological:      General: No focal deficit present.      Mental Status: He is alert and oriented to person, place, and time. Mental status is at baseline.      Cranial Nerves: No cranial nerve deficit.      Sensory: No sensory deficit.      Motor: No weakness.      Coordination: Coordination normal.      Gait: Gait normal.      Deep Tendon Reflexes: Reflexes normal.      Comments: He does have some short term memory issues evident, but is at his baseline.   Psychiatric:         Mood and Affect: Mood normal.         Behavior: Behavior normal.           PHQ-9 SCORE 7/19/2016 7/27/2016 11/9/2016   PHQ-9 Total Score 1 0 0       PHQ-2 Score:     PHQ-2 ( 1999 [employer]) 10/20/2020 8/3/2020   Q1: Little interest  or pleasure in doing things 0 0   Q2: Feeling down, depressed or hopeless 0 0   PHQ-2 Score 0 0       EDDIE-7 SCORE 8/30/2017 7/31/2018 8/3/2020   Total Score 1 1 0           I personally reviewed results withpatient as listed below:   Diagnostic Test Results:  Results for orders placed or performed during the hospital encounter of 10/20/20   CT Head w/o Contrast     Status: None    Narrative    PROCEDURE: CT HEAD W/O CONTRAST     HISTORY: dizzy/unsteady, hx of subarachnoid bleed in 2014.; Dizziness.    COMPARISON: 8/7/2019    TECHNIQUE:  Helical images of the head from the foramen magnum to the  vertex were obtained without contrast.    FINDINGS: Postoperative changes of prior right frontal kirsten hole with  an underlying tract of gliosis are redemonstrated. No ventriculostomy  catheter is seen. The lateral and third ventricles are dilated, with a  stable bifrontal diameter of up to 6.0 cm. No definite transependymal  CSF resorption is seen.    The gray-white distinction is unchanged.    The calvarium is intact. The mastoids and visualized paranasal sinuses  are well aerated.      Impression    IMPRESSION: Chronic ventriculomegaly, without sulcal crowding, not  significantly changed when compared to the prior CT head dated  8/7/2019. Consider MR to assess for subtle subependymal CSF resorption  or superficial siderosis.    CHASE BROTHERS MD   Results for orders placed or performed in visit on 10/20/20   Troponin GH     Status: None   Result Value Ref Range    Troponin 5.2 <34.0 pg/mL   TSH Reflex GH     Status: None   Result Value Ref Range    TSH Reflex 2.16 0.34 - 5.60 IU/mL   CBC with platelets differential     Status: None   Result Value Ref Range    WBC 6.7 4.0 - 11.0 10e9/L    RBC Count 4.68 4.4 - 5.9 10e12/L    Hemoglobin 14.7 13.3 - 17.7 g/dL    Hematocrit 43.7 40.0 - 53.0 %    MCV 93 78 - 100 fl    MCH 31.4 26.5 - 33.0 pg    MCHC 33.6 31.5 - 36.5 g/dL    RDW 12.2 10.0 - 15.0 %    Platelet Count 204 150 -  450 10e9/L    Diff Method Automated Method     % Neutrophils 77.1 %    % Lymphocytes 12.6 %    % Monocytes 8.7 %    % Eosinophils 0.8 %    % Basophils 0.6 %    % Immature Granulocytes 0.2 %    Absolute Neutrophil 5.1 1.6 - 8.3 10e9/L    Absolute Lymphocytes 0.8 0.8 - 5.3 10e9/L    Absolute Monocytes 0.6 0.0 - 1.3 10e9/L    Absolute Eosinophils 0.1 0.0 - 0.7 10e9/L    Absolute Basophils 0.0 0.0 - 0.2 10e9/L    Abs Immature Granulocytes 0.0 0 - 0.4 10e9/L   Comprehensive metabolic panel     Status: Abnormal   Result Value Ref Range    Sodium 142 134 - 144 mmol/L    Potassium 4.3 3.5 - 5.1 mmol/L    Chloride 104 98 - 107 mmol/L    Carbon Dioxide 29 21 - 31 mmol/L    Anion Gap 9 3 - 14 mmol/L    Glucose 126 (H) 70 - 105 mg/dL    Urea Nitrogen 23 7 - 25 mg/dL    Creatinine 1.11 0.70 - 1.30 mg/dL    GFR Estimate 65 >60 mL/min/[1.73_m2]    GFR Estimate If Black 78 >60 mL/min/[1.73_m2]    Calcium 10.0 8.6 - 10.3 mg/dL    Bilirubin Total 0.6 0.3 - 1.0 mg/dL    Albumin 4.7 3.5 - 5.7 g/dL    Protein Total 7.3 6.4 - 8.9 g/dL    Alkaline Phosphatase 48 34 - 104 U/L    ALT 13 7 - 52 U/L    AST 23 13 - 39 U/L   EKG 12-lead, tracing only (Same Day)     Status: None (Preliminary result)   Result Value Ref Range    Interpretation ECG Click View Image link to view waveform and result       EKG today shows sinus bradycardia with premature atrial contractions with a rate of 58 bpm.  He has an incomplete right bundle branch block.    ASSESSMENT/PLAN:       ICD-10-CM    1. Dizziness  R42 EKG 12-lead, tracing only (Same Day)     Comprehensive metabolic panel     CBC with platelets differential     CT Head w/o Contrast     Orthostatic blood pressure and pulse     Troponin GH     Troponin GH     CBC with platelets differential     Comprehensive metabolic panel   2. Screening for thyroid disorder  Z13.29 TSH Reflex GH     TSH Reflex GH       1.  His work-up looks fairly benign at this time.  The CT findings are not new.  Labs otherwise look  fairly normal.  They are going to have their furnace checked at home to ensure there is no carbon monoxide issues.  Otherwise, discussed that he may have a virus or allergies causing some of the dizziness.  If symptoms are worsening or not improving, he should follow-up with PCP for further evaluation.  2.  See #1.    Stacey Hurley MD  [facility] AND [facility]    Portions of this dictation were created using the Dragon Nuance voice recognition system. Proofreading was completed but there may be errors in text.

## 2020-10-21 ENCOUNTER — MYC MEDICAL ADVICE (OUTPATIENT)
Dept: FAMILY MEDICINE | Facility: OTHER | Age: 75
End: 2020-10-21

## 2020-10-22 ENCOUNTER — TELEPHONE (OUTPATIENT)
Dept: FAMILY MEDICINE | Facility: OTHER | Age: 75
End: 2020-10-22

## 2020-10-22 NOTE — TELEPHONE ENCOUNTER
The patient was given an appointment on Oct 26 th.  Mandy Mcfarlane LPN..................10/22/2020   2:17 PM

## 2020-10-22 NOTE — TELEPHONE ENCOUNTER
Patient is scheduled to see [facility] on November 5, 2020 but is looking to be worked in sooner and was told to follow up with [facility] per [facility]. Please advise.     Sydney Barnes on 10/22/2020 at 2:02 PM

## 2020-10-23 LAB — INTERPRETATION ECG - MUSE: NORMAL

## 2020-10-26 ENCOUNTER — OFFICE VISIT (OUTPATIENT)
Dept: FAMILY MEDICINE | Facility: OTHER | Age: 75
End: 2020-10-26
Attending: FAMILY MEDICINE
Payer: COMMERCIAL

## 2020-10-26 VITALS
TEMPERATURE: 96.4 F | DIASTOLIC BLOOD PRESSURE: 82 MMHG | OXYGEN SATURATION: 98 % | BODY MASS INDEX: 28.59 KG/M2 | SYSTOLIC BLOOD PRESSURE: 134 MMHG | HEART RATE: 59 BPM | RESPIRATION RATE: 16 BRPM | WEIGHT: 188 LBS

## 2020-10-26 DIAGNOSIS — F41.1 GENERALIZED ANXIETY DISORDER: ICD-10-CM

## 2020-10-26 DIAGNOSIS — S06.9X9S BRAIN INJURY WITH LOSS OF CONSCIOUSNESS, SEQUELA (H): ICD-10-CM

## 2020-10-26 DIAGNOSIS — R42 DIZZINESS: Primary | ICD-10-CM

## 2020-10-26 PROCEDURE — G0463 HOSPITAL OUTPT CLINIC VISIT: HCPCS

## 2020-10-26 PROCEDURE — 99213 OFFICE O/P EST LOW 20 MIN: CPT | Performed by: FAMILY MEDICINE

## 2020-10-26 ASSESSMENT — PAIN SCALES - GENERAL: PAINLEVEL: NO PAIN (0)

## 2020-10-26 NOTE — PROGRESS NOTES
"Nursing Notes:   Mandy Mcfarlane LPN  10/26/2020  9:56 AM  Signed  Chief Complaint   Patient presents with     RECHECK     dizziness       Initial /82   Pulse 59   Temp 96.4  F (35.8  C) (Temporal)   Resp 16   Wt 85.3 kg (188 lb)   SpO2 98%   BMI 28.59 kg/m   Estimated body mass index is 28.59 kg/m  as calculated from the following:    Height as of 10/20/20: 1.727 m (5' 8\").    Weight as of this encounter: 85.3 kg (188 lb).  Medication Reconciliation: complete    Mandy Mcfarlane LPN      SUBJECTIVE:  Chao Kearney  is a 74 year old male who comes in this morning because of dizziness.  He was seen last Tuesday by Dr. Johnny Markham.  There was some concern about carbon monoxide exposure and they were going to have the furnace checked.  Because of his previous brain bleed, CT of the head without contrast was done.  That scan showed chronic changes were not significantly different from previous ones.  The radiologist suggested an MRI to assess for subtle subependymal CSF resorption or superficial siderosis.     He was working on his deer stand.  He got down and had a funny sensation. He had no balance issues but \"didn't feel 100%\".  He didn't bow hunt that day.  He found meclizine in the cupboard and it seems to help.     Past Medical, Family, and Social History reviewed and updated as noted below.   ROS is negative except as noted above       No Known Allergies,   Family History   Problem Relation Age of Onset     Heart Disease Father 50        Heart Disease,heart failure     Other - See Comments Father          MS, long-standing     Cancer Mother 78        Cancer, lung cancer at age 78, developed brain metastases     Cancer Paternal Grandfather         Cancer, of cancer of unknown type     Other - See Comments Sister          MS   ,   Current Outpatient Medications   Medication     aspirin EC 81 MG EC tablet     Cholecalciferol (VITAMIN D3) 1000 UNITS CAPS     hydrochlorothiazide (HYDRODIURIL) " 12.5 MG tablet     lisinopril (ZESTRIL) 40 MG tablet     Multiple Vitamins-Minerals (MULTILEX-T&M) TABS     rosuvastatin (CRESTOR) 20 MG tablet     tamsulosin (FLOMAX) 0.4 MG capsule     triamcinolone (KENALOG) 0.1 % external cream     meclizine (ANTIVERT) 12.5 MG tablet     sildenafil (VIAGRA) 100 MG tablet     No current facility-administered medications for this visit.    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 Marin 1/24/05     Nontraumatic subarachnoid hemorrhage (H)     No Comments Provided     Osteoarthritis of hip     No Comments Provided     Other seborrheic keratosis     No Comments Provided     Other specified disorders of bone, unspecified site (CODE)     1/05,Lingual sean, bony growth, lower mandible, evaluation Dr. Marin 1/05     Pain in right hip     No Comments Provided     Personal history of other medical treatment (CODE)     05/16/05,Q wave in 3 and AVF leads on electrocardiogram done     Plantar fascial fibromatosis     No Comments Provided     Retention of urine     No Comments Provided     Screening for other and unspecified cardiovascular conditions     05/20/2005,Stress echocardiogram is negative for ischemia with ejection fraction of 85%     Stiffness of unspecified joint, not elsewhere classified     No  Comments Provided   ,   Patient Active Problem List    Diagnosis Date Noted     Generalized anxiety disorder 01/17/2018     Priority: Medium     Elevated prostate specific antigen (PSA) 01/17/2018     Priority: Medium     Overview:   in October 2005.  The patient is taking Levaquin daily for two months.  He   will have another PSA checked in October 2006, recommended by Dr. Wilson.       Hyperlipidemia 01/17/2018     Priority: Medium     Hypertension 01/17/2018     Priority: Medium     Plantar fasciitis 01/17/2018     Priority: Medium     Overview:   right       Benign non-nodular prostatic hyperplasia with lower urinary tract symptoms 07/27/2016     Priority: Medium     Brain injury (H) 01/16/2015     Priority: Medium     Retention of urine 12/12/2014     Priority: Medium     Right hip pain 10/10/2014     Priority: Medium     Anemia 09/22/2014     Priority: Medium     Hydrocephalus (H) 09/22/2014     Priority: Medium     SAH (subarachnoid hemorrhage) (H) 09/22/2014     Priority: Medium     Actinic keratosis 01/28/2014     Priority: Medium     Seborrheic keratosis 01/28/2014     Priority: Medium     H/O adenomatous polyp of colon 04/27/2010     Priority: Medium     Diverticulosis of sigmoid colon 04/26/2010     Priority: Medium   ,   Past Surgical History:   Procedure Laterality Date     COLONOSCOPY  04/26/2010    (04/26/2010),F/U 2015     COLONOSCOPY  10/15/2015    10/15/15,F/U 2025     EXTRACAPSULAR CATARACT EXTRATION WITH INTRAOCULAR LENS IMPLANT      2016     OTHER SURGICAL HISTORY      3/26/14,02935.0,[place] TOTAL HIP ARTHROPLASTY,Left,direct anterior approach, Dr. Lama     OTHER SURGICAL HISTORY      2014,[id number],CRANIOTOMY     OTHER SURGICAL HISTORY      11/29/2016,,HERNIA REPAIR,Left,LIH with mesh    and   Social History     Tobacco Use     Smoking status: Former Smoker     Smokeless tobacco: Never Used   Substance Use Topics     Alcohol use: Yes     Alcohol/week: 2.0 standard drinks     Types: 2 Cans of  beer per week     Frequency: 2-3 times a week     Drinks per session: 1 or 2     Comment: 2-3 times/week     OBJECTIVE:  /82   Pulse 59   Temp 96.4  F (35.8  C) (Temporal)   Resp 16   Wt 85.3 kg (188 lb)   SpO2 98%   BMI 28.59 kg/m     EXAM:  Alert and cooperative, no distress.  Affect is broad ranging and appropriate.  Short-term memory is still not good but better than it has been.  TMs appear clear.  Throat is clear.  Neck is supple without significant adenopathy.  No thyromegaly or bruits.  Lungs are clear, no rales rhonchi or wheezes are heard.  Cardiac RRR without murmur.  He has no focal neurologic findings.  Labs were reviewed and were unremarkable.  CT was reviewed.  EKG shows sinus bradycardia with a PAC.  Discussed this in some detail.  ASSESSMENT/Plan :    Chao was seen today for recheck.    Diagnoses and all orders for this visit:    Dizziness    Brain injury with loss of consciousness, sequela (H)    Generalized anxiety disorder      At this point, difficult to characterize whether or not this is vertigo versus lightheadedness.  What ever symptoms that he is having now seem to be mitigated and really not even noticeable.  We discussed potentially doing MRI for further evaluation or potentially doing a Zio patch but at this point we elected to simply employ expectant management.  If he is having further difficulties they will let us know.    A total of 15 minutes was spent with the patient, greater than 50% of the time was spent in counseling/discussion of the aforementioned concerns.     Chan Cook MD

## 2020-10-27 ENCOUNTER — MYC MEDICAL ADVICE (OUTPATIENT)
Dept: FAMILY MEDICINE | Facility: OTHER | Age: 75
End: 2020-10-27

## 2020-11-24 ENCOUNTER — MYC MEDICAL ADVICE (OUTPATIENT)
Dept: FAMILY MEDICINE | Facility: OTHER | Age: 75
End: 2020-11-24

## 2020-11-24 DIAGNOSIS — R42 VERTIGO: Primary | ICD-10-CM

## 2020-11-24 RX ORDER — MECLIZINE HCL 12.5 MG 12.5 MG/1
25 TABLET ORAL 4 TIMES DAILY PRN
Qty: 20 TABLET | Refills: 3 | Status: SHIPPED | OUTPATIENT
Start: 2020-11-24 | End: 2021-07-21

## 2020-12-27 ENCOUNTER — HEALTH MAINTENANCE LETTER (OUTPATIENT)
Age: 75
End: 2020-12-27

## 2020-12-29 ENCOUNTER — TELEPHONE (OUTPATIENT)
Dept: FAMILY MEDICINE | Facility: OTHER | Age: 75
End: 2020-12-29

## 2020-12-29 NOTE — TELEPHONE ENCOUNTER
Please make note in patients chart and MYCHART that he had his flu injection at [facility] on 9/8/20.   Ita Crocker on 12/29/2020 at 9:55 AM

## 2021-04-05 ENCOUNTER — MYC MEDICAL ADVICE (OUTPATIENT)
Dept: FAMILY MEDICINE | Facility: OTHER | Age: 76
End: 2021-04-05

## 2021-04-05 NOTE — TELEPHONE ENCOUNTER
The patient was given an appointment on Thursday April 8 th with Dr Cook.  Mandy Mcfarlane LPN..................4/5/2021   1:43 PM

## 2021-04-08 ENCOUNTER — OFFICE VISIT (OUTPATIENT)
Dept: FAMILY MEDICINE | Facility: OTHER | Age: 76
End: 2021-04-08
Attending: FAMILY MEDICINE
Payer: MEDICARE

## 2021-04-08 ENCOUNTER — HOSPITAL ENCOUNTER (OUTPATIENT)
Dept: GENERAL RADIOLOGY | Facility: OTHER | Age: 76
End: 2021-04-08
Attending: FAMILY MEDICINE
Payer: MEDICARE

## 2021-04-08 VITALS
TEMPERATURE: 97.2 F | SYSTOLIC BLOOD PRESSURE: 132 MMHG | WEIGHT: 189 LBS | OXYGEN SATURATION: 98 % | RESPIRATION RATE: 16 BRPM | BODY MASS INDEX: 28.74 KG/M2 | HEART RATE: 60 BPM | DIASTOLIC BLOOD PRESSURE: 76 MMHG

## 2021-04-08 DIAGNOSIS — M25.551 RIGHT HIP PAIN: Primary | ICD-10-CM

## 2021-04-08 DIAGNOSIS — Z96.642 H/O TOTAL HIP ARTHROPLASTY, LEFT: ICD-10-CM

## 2021-04-08 DIAGNOSIS — M25.552 BILATERAL HIP PAIN: ICD-10-CM

## 2021-04-08 DIAGNOSIS — M25.551 BILATERAL HIP PAIN: ICD-10-CM

## 2021-04-08 PROCEDURE — 99213 OFFICE O/P EST LOW 20 MIN: CPT | Performed by: FAMILY MEDICINE

## 2021-04-08 PROCEDURE — 73523 X-RAY EXAM HIPS BI 5/> VIEWS: CPT

## 2021-04-08 PROCEDURE — G0463 HOSPITAL OUTPT CLINIC VISIT: HCPCS

## 2021-04-08 PROCEDURE — G0463 HOSPITAL OUTPT CLINIC VISIT: HCPCS | Mod: 25

## 2021-04-08 ASSESSMENT — ANXIETY QUESTIONNAIRES
GAD7 TOTAL SCORE: 1
2. NOT BEING ABLE TO STOP OR CONTROL WORRYING: NOT AT ALL
3. WORRYING TOO MUCH ABOUT DIFFERENT THINGS: SEVERAL DAYS
7. FEELING AFRAID AS IF SOMETHING AWFUL MIGHT HAPPEN: NOT AT ALL
5. BEING SO RESTLESS THAT IT IS HARD TO SIT STILL: NOT AT ALL
1. FEELING NERVOUS, ANXIOUS, OR ON EDGE: NOT AT ALL
6. BECOMING EASILY ANNOYED OR IRRITABLE: NOT AT ALL
IF YOU CHECKED OFF ANY PROBLEMS ON THIS QUESTIONNAIRE, HOW DIFFICULT HAVE THESE PROBLEMS MADE IT FOR YOU TO DO YOUR WORK, TAKE CARE OF THINGS AT HOME, OR GET ALONG WITH OTHER PEOPLE: NOT DIFFICULT AT ALL

## 2021-04-08 ASSESSMENT — PATIENT HEALTH QUESTIONNAIRE - PHQ9: 5. POOR APPETITE OR OVEREATING: NOT AT ALL

## 2021-04-08 ASSESSMENT — PAIN SCALES - GENERAL: PAINLEVEL: NO PAIN (0)

## 2021-04-08 NOTE — NURSING NOTE
"Chief Complaint   Patient presents with     Other     Discuss possible right hip replacement       Initial /76   Pulse 60   Temp 97.2  F (36.2  C) (Tympanic)   Resp 16   Wt 85.7 kg (189 lb)   SpO2 98%   BMI 28.74 kg/m   Estimated body mass index is 28.74 kg/m  as calculated from the following:    Height as of 10/20/20: 1.727 m (5' 8\").    Weight as of this encounter: 85.7 kg (189 lb).  Medication Reconciliation: complete    Gage Davis LPN    "

## 2021-04-08 NOTE — PATIENT INSTRUCTIONS
Your right hip is wearing but is not currently causing pain when walking or standing, so I don't think we need to do anything now. If it bothers more with activity, we can consider doing an image guided steroid shot to buy time (especially if it bothers around bow season).      I will check on the [employer] [facility] and let you know when it is slated to re-open.

## 2021-04-08 NOTE — PROGRESS NOTES
"Nursing Notes:   Gage Davis LPN  2021  4:36 PM  Signed  Chief Complaint   Patient presents with     Other     Discuss possible right hip replacement       Initial /76   Pulse 60   Temp 97.2  F (36.2  C) (Tympanic)   Resp 16   Wt 85.7 kg (189 lb)   SpO2 98%   BMI 28.74 kg/m   Estimated body mass index is 28.74 kg/m  as calculated from the following:    Height as of 10/20/20: 1.727 m (5' 8\").    Weight as of this encounter: 85.7 kg (189 lb).  Medication Reconciliation: complete    Gage Davis LPN        SUBJECTIVE:  Chao Kearney  is a 75 year old male who comes in today for discussion of hip pain. He is walking every day.  He hurts more when sitting than with activity. His left hip feels ok, but his right hip bothers more.  He had his left hip replaced by Dr. Lama in .  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  This is quite bothersome to him and he cannot wait to get back at it whenever possible.    Past Medical, Family, and Social History reviewed and updated as noted below.   ROS is negative except as noted above       Allergies   Allergen Reactions     Fluress    ,   Family History   Problem Relation Age of Onset     Heart Disease Father 50        Heart Disease,heart failure     Other - See Comments Father          MS, long-standing     Cancer Mother 78        Cancer, lung cancer at age 78, developed brain metastases     Cancer Paternal Grandfather         Cancer, of cancer of unknown type     Other - See Comments Sister          MS   ,   Current Outpatient Medications   Medication     aspirin EC 81 MG EC tablet     Cholecalciferol (VITAMIN D3) 1000 UNITS CAPS     hydrochlorothiazide (HYDRODIURIL) 12.5 MG tablet     lisinopril (ZESTRIL) 40 MG tablet     meclizine (ANTIVERT) 12.5 MG tablet     Multiple Vitamins-Minerals " (MULTILEX-T&M) TABS     rosuvastatin (CRESTOR) 20 MG tablet     tamsulosin (FLOMAX) 0.4 MG capsule     triamcinolone (KENALOG) 0.1 % external cream     sildenafil (VIAGRA) 100 MG tablet     No current facility-administered medications for this visit.    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 Marin 1/24/05     Nontraumatic subarachnoid hemorrhage (H)     No Comments Provided     Osteoarthritis of hip     No Comments Provided     Other seborrheic keratosis     No Comments Provided     Other specified disorders of bone, unspecified site (CODE)     1/05,Lingual sean, bony growth, lower mandible, evaluation Dr. Marin 1/05     Pain in right hip     No Comments Provided     Personal history of other medical treatment (CODE)     05/16/05,Q wave in 3 and AVF leads on electrocardiogram done     Plantar fascial fibromatosis     No Comments Provided     Retention of urine     No Comments Provided     Screening for other and unspecified cardiovascular conditions     05/20/2005,Stress echocardiogram is negative for ischemia with ejection fraction of 85%     Stiffness of unspecified joint, not elsewhere classified     No Comments Provided   ,   Patient Active Problem List    Diagnosis Date Noted     Generalized anxiety disorder 01/17/2018      Priority: Medium     Elevated prostate specific antigen (PSA) 01/17/2018     Priority: Medium     Overview:   in October 2005.  The patient is taking Levaquin daily for two months.  He   will have another PSA checked in October 2006, recommended by Dr. Wilson.       Hyperlipidemia 01/17/2018     Priority: Medium     Hypertension 01/17/2018     Priority: Medium     Plantar fasciitis 01/17/2018     Priority: Medium     Overview:   right       Benign non-nodular prostatic hyperplasia with lower urinary tract symptoms 07/27/2016     Priority: Medium     Brain injury (H) 01/16/2015     Priority: Medium     Retention of urine 12/12/2014     Priority: Medium     Right hip pain 10/10/2014     Priority: Medium     Anemia 09/22/2014     Priority: Medium     Hydrocephalus (H) 09/22/2014     Priority: Medium     SAH (subarachnoid hemorrhage) (H) 09/22/2014     Priority: Medium     Actinic keratosis 01/28/2014     Priority: Medium     Seborrheic keratosis 01/28/2014     Priority: Medium     H/O adenomatous polyp of colon 04/27/2010     Priority: Medium     Diverticulosis of sigmoid colon 04/26/2010     Priority: Medium   ,   Past Surgical History:   Procedure Laterality Date     COLONOSCOPY  04/26/2010    (04/26/2010),F/U 2015     COLONOSCOPY  10/15/2015    10/15/15,F/U 2025     EXTRACAPSULAR CATARACT EXTRATION WITH INTRAOCULAR LENS IMPLANT      2016     OTHER SURGICAL HISTORY      3/26/14,65235.0,DE TOTAL HIP ARTHROPLASTY,Left,direct anterior approach, Dr. Lama     OTHER SURGICAL HISTORY      2014,[id number],CRANIOTOMY     OTHER SURGICAL HISTORY      11/29/2016,,HERNIA REPAIR,Left,LIH with mesh    and   Social History     Tobacco Use     Smoking status: Former Smoker     Smokeless tobacco: Never Used   Substance Use Topics     Alcohol use: Yes     Alcohol/week: 2.0 standard drinks     Types: 2 Cans of beer per week     Frequency: 2-3 times a week     Drinks per session: 1 or 2     Comment: 2-3 times/week     OBJECTIVE:  BP  132/76   Pulse 60   Temp 97.2  F (36.2  C) (Tympanic)   Resp 16   Wt 85.7 kg (189 lb)   SpO2 98%   BMI 28.74 kg/m     EXAM:  Alert and cooperative, no distress.  He is here by himself and has difficulty with short-term memory.  Examination of his gait reveals no antalgic gait.  He has normal range of motion of his left hip without pain.  Examination of the right hip reveals some slight discomfort on extreme flexion but not much with internal and external rotation in flexion.    He is hip and pelvis x-rays show severe degenerative change in his right hip joint.  His left prosthetic hip is well-seated and appears in good position.  ASSESSMENT/Plan :    Chao was seen today for other.    Diagnoses and all orders for this visit:    Right hip pain  -     XR Pelvis and Hip Bilateral 2 Views; Future    H/O total hip arthroplasty, left  -     XR Pelvis and Hip Bilateral 2 Views; Future      While he has significant degenerative change in his right hip, he does not have pain with standing or with activity so I do not think we need to move ahead with anything at this time.  We discussed doing a temporizing measures such as a cortisone injection if he has pain with ambulation or standing but because of the fact that it is really not bothersome at this point, I do not think we need to move ahead.  He will let us know if he has further symptoms.    Chan Cook MD

## 2021-04-09 ASSESSMENT — ANXIETY QUESTIONNAIRES: GAD7 TOTAL SCORE: 1

## 2021-05-04 ENCOUNTER — MYC MEDICAL ADVICE (OUTPATIENT)
Dept: FAMILY MEDICINE | Facility: OTHER | Age: 76
End: 2021-05-04

## 2021-05-04 DIAGNOSIS — N40.1 BENIGN NON-NODULAR PROSTATIC HYPERPLASIA WITH LOWER URINARY TRACT SYMPTOMS: ICD-10-CM

## 2021-05-04 RX ORDER — TAMSULOSIN HYDROCHLORIDE 0.4 MG/1
0.8 CAPSULE ORAL
Qty: 180 CAPSULE | Refills: 3 | Status: SHIPPED | OUTPATIENT
Start: 2021-05-04 | End: 2021-09-21

## 2021-06-21 ENCOUNTER — OFFICE VISIT (OUTPATIENT)
Dept: FAMILY MEDICINE | Facility: OTHER | Age: 76
End: 2021-06-21
Attending: FAMILY MEDICINE
Payer: COMMERCIAL

## 2021-06-21 VITALS
BODY MASS INDEX: 27.86 KG/M2 | SYSTOLIC BLOOD PRESSURE: 110 MMHG | OXYGEN SATURATION: 98 % | WEIGHT: 183.8 LBS | TEMPERATURE: 97.2 F | HEIGHT: 68 IN | HEART RATE: 53 BPM | DIASTOLIC BLOOD PRESSURE: 62 MMHG | RESPIRATION RATE: 20 BRPM

## 2021-06-21 DIAGNOSIS — T14.8XXA ABRASION: Primary | ICD-10-CM

## 2021-06-21 PROCEDURE — G0463 HOSPITAL OUTPT CLINIC VISIT: HCPCS | Performed by: FAMILY MEDICINE

## 2021-06-21 PROCEDURE — 99213 OFFICE O/P EST LOW 20 MIN: CPT | Performed by: FAMILY MEDICINE

## 2021-06-21 ASSESSMENT — PAIN SCALES - GENERAL: PAINLEVEL: NO PAIN (0)

## 2021-06-21 ASSESSMENT — MIFFLIN-ST. JEOR: SCORE: 1543.21

## 2021-06-21 NOTE — NURSING NOTE
Patient here for sore on the right shin and the right foot after dropping a log on it. Medication Reconciliation: complete.    Beth Gotti LPN  6/21/2021 3:43 PM

## 2021-06-22 NOTE — PROGRESS NOTES
"  SUBJECTIVE:   Chao Kearney is a 75 year old male who presents to clinic today for the following health issues: Right leg trauma    Patient arrives here for right leg trauma.  He reports a log recently hit his right shin.  This occurred about a week ago.  He is up-to-date on his tetanus.  He is concerned is not healing as fast as it should reports the log did not hit directly on the shin but more at an angle falling against the shin        Patient Active Problem List    Diagnosis Date Noted     Generalized anxiety disorder 01/17/2018     Priority: Medium     Elevated prostate specific antigen (PSA) 01/17/2018     Priority: Medium     Overview:   in October 2005.  The patient is taking Levaquin daily for two months.  He   will have another PSA checked in October 2006, recommended by Dr. Wilson.       Hyperlipidemia 01/17/2018     Priority: Medium     Hypertension 01/17/2018     Priority: Medium     Plantar fasciitis 01/17/2018     Priority: Medium     Overview:   right       Benign non-nodular prostatic hyperplasia with lower urinary tract symptoms 07/27/2016     Priority: Medium     Brain injury (H) 01/16/2015     Priority: Medium     Retention of urine 12/12/2014     Priority: Medium     Right hip pain 10/10/2014     Priority: Medium     Anemia 09/22/2014     Priority: Medium     Hydrocephalus (H) 09/22/2014     Priority: Medium     SAH (subarachnoid hemorrhage) (H) 09/22/2014     Priority: Medium     Actinic keratosis 01/28/2014     Priority: Medium     Seborrheic keratosis 01/28/2014     Priority: Medium     H/O adenomatous polyp of colon 04/27/2010     Priority: Medium     Diverticulosis of sigmoid colon 04/26/2010     Priority: Medium       Review of Systems     OBJECTIVE:     /62   Pulse 53   Temp 97.2  F (36.2  C)   Resp 20   Ht 1.727 m (5' 8\")   Wt 83.4 kg (183 lb 12.8 oz)   SpO2 98%   BMI 27.95 kg/m    Body mass index is 27.95 kg/m .  Physical Exam  Constitutional:       Appearance: Normal " appearance.   Musculoskeletal: Normal range of motion.      Comments: There is an abrasion.  Healing well.  The tibia is manipulated I cannot express any pain or discomfort abrasion measures about 2 x 3 cm   Neurological:      Mental Status: He is alert.         Diagnostic Test Results:  none     ASSESSMENT/PLAN:         1. Abrasion  Reassurance is given to patient.  Advised him it can take some time physician to heal.  Follow-up as needed        Cullen Grier MD  [facility]

## 2021-07-20 DIAGNOSIS — R42 VERTIGO: ICD-10-CM

## 2021-07-21 RX ORDER — MECLIZINE HCL 12.5 MG 12.5 MG/1
TABLET ORAL
Qty: 20 TABLET | Refills: 3 | Status: SHIPPED | OUTPATIENT
Start: 2021-07-21 | End: 2023-01-31

## 2021-07-21 NOTE — TELEPHONE ENCOUNTER
"Requested Prescriptions   Pending Prescriptions Disp Refills     meclizine (ANTIVERT) 12.5 MG tablet [Pharmacy Med Name: MECLIZINE 12.5MG (RX) TABLETS] 20 tablet 3     Sig: TAKE 2 TABLETS(25 MG) BY MOUTH FOUR TIMES DAILY AS NEEDED FOR DIZZINESS        Antivertigo/Antiemetic Agents Passed - 7/20/2021  8:55 AM        Passed - Recent (12 mo) or future (30 days) visit within the authorizing provider's specialty     Patient has had an office visit with the authorizing provider or a provider within the authorizing providers department within the previous 12 mos or has a future within next 30 days. See \"Patient Info\" tab in inbasket, or \"Choose Columns\" in Meds & Orders section of the refill encounter.              Passed - Medication is active on med list        Passed - Patient is 18 years of age or older           LOV 6/21/21 [place]  Prescription approved per [facility] Refill Protocol.  Brenda J. Goodell, RN on 7/21/2021 at 11:15 AM    "

## 2021-07-24 ENCOUNTER — MYC MEDICAL ADVICE (OUTPATIENT)
Dept: FAMILY MEDICINE | Facility: OTHER | Age: 76
End: 2021-07-24

## 2021-09-21 ENCOUNTER — OFFICE VISIT (OUTPATIENT)
Dept: FAMILY MEDICINE | Facility: OTHER | Age: 76
End: 2021-09-21
Attending: FAMILY MEDICINE
Payer: MEDICARE

## 2021-09-21 VITALS
RESPIRATION RATE: 16 BRPM | TEMPERATURE: 96.4 F | HEART RATE: 57 BPM | OXYGEN SATURATION: 99 % | WEIGHT: 188 LBS | SYSTOLIC BLOOD PRESSURE: 134 MMHG | BODY MASS INDEX: 28.59 KG/M2 | DIASTOLIC BLOOD PRESSURE: 80 MMHG

## 2021-09-21 DIAGNOSIS — N40.1 BENIGN NON-NODULAR PROSTATIC HYPERPLASIA WITH LOWER URINARY TRACT SYMPTOMS: ICD-10-CM

## 2021-09-21 DIAGNOSIS — E78.5 HYPERLIPIDEMIA, UNSPECIFIED HYPERLIPIDEMIA TYPE: ICD-10-CM

## 2021-09-21 DIAGNOSIS — E78.2 MIXED HYPERLIPIDEMIA: ICD-10-CM

## 2021-09-21 DIAGNOSIS — Z23 NEEDS FLU SHOT: ICD-10-CM

## 2021-09-21 DIAGNOSIS — N42.9 PROSTATE DISORDER: ICD-10-CM

## 2021-09-21 DIAGNOSIS — I10 ESSENTIAL HYPERTENSION, BENIGN: Primary | ICD-10-CM

## 2021-09-21 DIAGNOSIS — L82.1 SEBORRHEIC KERATOSIS: ICD-10-CM

## 2021-09-21 DIAGNOSIS — S06.9X9S BRAIN INJURY WITH LOSS OF CONSCIOUSNESS, SEQUELA (H): ICD-10-CM

## 2021-09-21 LAB
ALBUMIN SERPL-MCNC: 4.3 G/DL (ref 3.5–5.7)
ALP SERPL-CCNC: 38 U/L (ref 34–104)
ALT SERPL W P-5'-P-CCNC: 14 U/L (ref 7–52)
ANION GAP SERPL CALCULATED.3IONS-SCNC: 8 MMOL/L (ref 3–14)
AST SERPL W P-5'-P-CCNC: 23 U/L (ref 13–39)
BASOPHILS # BLD AUTO: 0 10E3/UL (ref 0–0.2)
BASOPHILS NFR BLD AUTO: 1 %
BILIRUB SERPL-MCNC: 0.6 MG/DL (ref 0.3–1)
BUN SERPL-MCNC: 21 MG/DL (ref 7–25)
CALCIUM SERPL-MCNC: 9.6 MG/DL (ref 8.6–10.3)
CHLORIDE BLD-SCNC: 104 MMOL/L (ref 98–107)
CHOLEST SERPL-MCNC: 153 MG/DL
CO2 SERPL-SCNC: 29 MMOL/L (ref 21–31)
CREAT SERPL-MCNC: 1.15 MG/DL (ref 0.7–1.3)
EOSINOPHIL # BLD AUTO: 0.1 10E3/UL (ref 0–0.7)
EOSINOPHIL NFR BLD AUTO: 1 %
ERYTHROCYTE [DISTWIDTH] IN BLOOD BY AUTOMATED COUNT: 12.4 % (ref 10–15)
FASTING STATUS PATIENT QL REPORTED: NO
GFR SERPL CREATININE-BSD FRML MDRD: 62 ML/MIN/1.73M2
GLUCOSE BLD-MCNC: 108 MG/DL (ref 70–105)
HCT VFR BLD AUTO: 42.6 % (ref 40–53)
HDLC SERPL-MCNC: 59 MG/DL (ref 23–92)
HGB BLD-MCNC: 14.4 G/DL (ref 13.3–17.7)
IMM GRANULOCYTES # BLD: 0 10E3/UL
IMM GRANULOCYTES NFR BLD: 0 %
LDLC SERPL CALC-MCNC: 65 MG/DL
LYMPHOCYTES # BLD AUTO: 0.8 10E3/UL (ref 0.8–5.3)
LYMPHOCYTES NFR BLD AUTO: 13 %
MCH RBC QN AUTO: 31.5 PG (ref 26.5–33)
MCHC RBC AUTO-ENTMCNC: 33.8 G/DL (ref 31.5–36.5)
MCV RBC AUTO: 93 FL (ref 78–100)
MONOCYTES # BLD AUTO: 0.6 10E3/UL (ref 0–1.3)
MONOCYTES NFR BLD AUTO: 10 %
NEUTROPHILS # BLD AUTO: 4.7 10E3/UL (ref 1.6–8.3)
NEUTROPHILS NFR BLD AUTO: 75 %
NONHDLC SERPL-MCNC: 94 MG/DL
NRBC # BLD AUTO: 0 10E3/UL
NRBC BLD AUTO-RTO: 0 /100
PLATELET # BLD AUTO: 171 10E3/UL (ref 150–450)
POTASSIUM BLD-SCNC: 4.2 MMOL/L (ref 3.5–5.1)
PROT SERPL-MCNC: 6.8 G/DL (ref 6.4–8.9)
PSA SERPL-MCNC: 6.26 UG/L (ref 0–4)
RBC # BLD AUTO: 4.57 10E6/UL (ref 4.4–5.9)
SODIUM SERPL-SCNC: 141 MMOL/L (ref 134–144)
TRIGL SERPL-MCNC: 147 MG/DL
WBC # BLD AUTO: 6.3 10E3/UL (ref 4–11)

## 2021-09-21 PROCEDURE — G0463 HOSPITAL OUTPT CLINIC VISIT: HCPCS

## 2021-09-21 PROCEDURE — 82040 ASSAY OF SERUM ALBUMIN: CPT | Mod: ZL | Performed by: FAMILY MEDICINE

## 2021-09-21 PROCEDURE — G0463 HOSPITAL OUTPT CLINIC VISIT: HCPCS | Mod: 25

## 2021-09-21 PROCEDURE — 80061 LIPID PANEL: CPT | Mod: ZL | Performed by: FAMILY MEDICINE

## 2021-09-21 PROCEDURE — 90662 IIV NO PRSV INCREASED AG IM: CPT

## 2021-09-21 PROCEDURE — 99214 OFFICE O/P EST MOD 30 MIN: CPT | Performed by: FAMILY MEDICINE

## 2021-09-21 PROCEDURE — 84153 ASSAY OF PSA TOTAL: CPT | Mod: ZL | Performed by: FAMILY MEDICINE

## 2021-09-21 PROCEDURE — 36415 COLL VENOUS BLD VENIPUNCTURE: CPT | Mod: ZL | Performed by: FAMILY MEDICINE

## 2021-09-21 PROCEDURE — 85004 AUTOMATED DIFF WBC COUNT: CPT | Mod: ZL | Performed by: FAMILY MEDICINE

## 2021-09-21 PROCEDURE — G0008 ADMIN INFLUENZA VIRUS VAC: HCPCS

## 2021-09-21 RX ORDER — HYDROCHLOROTHIAZIDE 12.5 MG/1
TABLET ORAL
Qty: 90 TABLET | Refills: 3 | Status: SHIPPED | OUTPATIENT
Start: 2021-09-21 | End: 2021-09-22

## 2021-09-21 RX ORDER — TAMSULOSIN HYDROCHLORIDE 0.4 MG/1
0.8 CAPSULE ORAL
Qty: 180 CAPSULE | Refills: 3 | Status: SHIPPED | OUTPATIENT
Start: 2021-09-21 | End: 2021-09-22

## 2021-09-21 RX ORDER — ROSUVASTATIN CALCIUM 20 MG/1
20 TABLET, COATED ORAL AT BEDTIME
Qty: 90 TABLET | Refills: 3 | Status: SHIPPED | OUTPATIENT
Start: 2021-09-21 | End: 2021-09-22

## 2021-09-21 RX ORDER — LISINOPRIL 40 MG/1
TABLET ORAL
Qty: 90 TABLET | Refills: 3 | Status: SHIPPED | OUTPATIENT
Start: 2021-09-21 | End: 2021-09-22

## 2021-09-21 ASSESSMENT — PATIENT HEALTH QUESTIONNAIRE - PHQ9: 5. POOR APPETITE OR OVEREATING: NOT AT ALL

## 2021-09-21 ASSESSMENT — ANXIETY QUESTIONNAIRES
7. FEELING AFRAID AS IF SOMETHING AWFUL MIGHT HAPPEN: NOT AT ALL
GAD7 TOTAL SCORE: 0
6. BECOMING EASILY ANNOYED OR IRRITABLE: NOT AT ALL
2. NOT BEING ABLE TO STOP OR CONTROL WORRYING: NOT AT ALL
IF YOU CHECKED OFF ANY PROBLEMS ON THIS QUESTIONNAIRE, HOW DIFFICULT HAVE THESE PROBLEMS MADE IT FOR YOU TO DO YOUR WORK, TAKE CARE OF THINGS AT HOME, OR GET ALONG WITH OTHER PEOPLE: NOT DIFFICULT AT ALL
3. WORRYING TOO MUCH ABOUT DIFFERENT THINGS: NOT AT ALL
5. BEING SO RESTLESS THAT IT IS HARD TO SIT STILL: NOT AT ALL
1. FEELING NERVOUS, ANXIOUS, OR ON EDGE: NOT AT ALL

## 2021-09-21 ASSESSMENT — PAIN SCALES - GENERAL: PAINLEVEL: NO PAIN (0)

## 2021-09-21 NOTE — PROGRESS NOTES
Nursing Notes:   Martine Mann LPN  2021 10:11 AM  Signed  Medication list reconciled.  Martine Mann LPN on 2021 at 9:22 AM        SUBJECTIVE:  Chao Kearney  is a 75 year old male who comes in today for evaluation of some moles on his forearm.  He has several scattered dark spots.  These appear consistent with seborrheic keratoses.    He continues on lisinopril 40 mg daily and hydrochlorothiazide 12.5 mg daily along with rosuvastatin 20 mg daily and Flomax 0.4 mg daily.  He is due for follow-up labs.    He is up-to-date on all his immunizations.  He has already had his flu shot.    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  He is very interested in doing the [employer] again at the [employer] but it is not open yet.     Past Medical, Family, and Social History reviewed and updated as noted below.   ROS is negative except as noted above       Allergies   Allergen Reactions     Fluress    ,   Family History   Problem Relation Age of Onset     Cancer Mother 78        Cancer, lung cancer at age 78, developed brain metastases     Heart Disease Father 50        Heart Disease,heart failure     Other - See Comments Father          MS, long-standing     Other - See Comments Sister          MS     Cancer Paternal Grandfather         Cancer, of cancer of unknown type   ,   Current Outpatient Medications   Medication     aspirin EC 81 MG EC tablet     Cholecalciferol (VITAMIN D3) 1000 UNITS CAPS     hydrochlorothiazide (HYDRODIURIL) 12.5 MG tablet     lisinopril (ZESTRIL) 40 MG tablet     meclizine (ANTIVERT) 12.5 MG tablet     Multiple Vitamins-Minerals (MULTILEX-T&M) TABS     rosuvastatin (CRESTOR) 20 MG tablet     tamsulosin (FLOMAX) 0.4 MG capsule     triamcinolone (KENALOG) 0.1 % external cream     No current facility-administered medications for this visit.   ,   Past Medical History:   Diagnosis Date     Actinic keratosis     No Comments Provided     Anemia     No Comments  Provided     Diverticulosis of large intestine without perforation or abscess without bleeding     No Comments Provided     Elevated prostate specific antigen (PSA)     No Comments Provided     Essential (primary) hypertension     No Comments Provided     Generalized anxiety disorder     No Comments Provided     History of colonic polyps     No Comments Provided     Hydrocephalus (H)     (obstructive)     Hyperlipidemia     No Comments Provided     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 Marin 1/24/05     Nontraumatic subarachnoid hemorrhage (H)     No Comments Provided     Osteoarthritis of hip     No Comments Provided     Other seborrheic keratosis     No Comments Provided     Other specified disorders of bone, unspecified site (CODE)     1/05,Lingual sean, bony growth, lower mandible, evaluation Dr. Marin 1/05     Pain in right hip     No Comments Provided     Personal history of other medical treatment (CODE)     05/16/05,Q wave in 3 and AVF leads on electrocardiogram done     Plantar fascial fibromatosis     No Comments Provided     Retention of urine     No Comments Provided     Screening for other and unspecified cardiovascular conditions     05/20/2005,Stress echocardiogram is negative for ischemia with ejection fraction of 85%     Stiffness of unspecified joint, not elsewhere classified     No Comments Provided   ,   Patient Active Problem List    Diagnosis Date Noted     Generalized anxiety disorder 01/17/2018     Priority: Medium     Elevated prostate specific antigen (PSA) 01/17/2018     Priority: Medium     Overview:   in October 2005.  The patient is taking Levaquin daily for two months.  He   will have another PSA checked in October 2006, recommended by Dr. Wilson.       Hyperlipidemia 01/17/2018     Priority: Medium     Hypertension 01/17/2018     Priority: Medium      Plantar fasciitis 2018     Priority: Medium     Overview:   right       Benign non-nodular prostatic hyperplasia with lower urinary tract symptoms 2016     Priority: Medium     Brain injury (H) 2015     Priority: Medium     Retention of urine 2014     Priority: Medium     Right hip pain 10/10/2014     Priority: Medium     Anemia 2014     Priority: Medium     Hydrocephalus (H) 2014     Priority: Medium     SAH (subarachnoid hemorrhage) (H) 2014     Priority: Medium     Actinic keratosis 2014     Priority: Medium     Seborrheic keratosis 2014     Priority: Medium     H/O adenomatous polyp of colon 2010     Priority: Medium     Diverticulosis of sigmoid colon 2010     Priority: Medium   ,   Past Surgical History:   Procedure Laterality Date     COLONOSCOPY  2010    (2010),F/U      COLONOSCOPY  10/15/2015    10/15/15,F/U      EXTRACAPSULAR CATARACT EXTRATION WITH INTRAOCULAR LENS IMPLANT           OTHER SURGICAL HISTORY      3/26/14,73951.0,MO TOTAL HIP ARTHROPLASTY,Left,direct anterior approach, Dr. Lama     OTHER SURGICAL HISTORY      ,[id number],CRANIOTOMY     OTHER SURGICAL HISTORY      2016,,HERNIA REPAIR,Left,LIH with mesh    and   Social History     Tobacco Use     Smoking status: Former Smoker     Quit date: 1970     Years since quittin.7     Smokeless tobacco: Never Used     Tobacco comment: smoked off and on for 2 years   Substance Use Topics     Alcohol use: Yes     Alcohol/week: 2.0 standard drinks     Types: 2 Cans of beer per week     Comment: 2 times a week/2 drinks     OBJECTIVE:  /80   Pulse 57   Temp (!) 96.4  F (35.8  C) (Temporal)   Resp 16   Wt 85.3 kg (188 lb)   SpO2 99%   BMI 28.59 kg/m     EXAM:  General Appearance: Pleasant, alert, appropriate appearance for age. No acute distress  Head Exam: Normal. Normocephalic, atraumatic.  Eye Exam:  Normal external eyes,  conjunctivae, lids, cornea. MINDY. EOMI  Ear Exam: Normal TM's bilaterally. Normal auditory canals and external ears. Non-tender.  Nose Exam: Normal external nose, mucus membranes, and septum.  OroPharynx Exam:  Dental hygiene adequate. Normal buccal mucosa. Normal pharynx.  Neck Exam:  Supple, no masses or nodes. No audible bruits  Thyroid Exam: No nodules or enlargement.  Chest/Respiratory Exam: Normal chest wall and respirations. Clear to auscultation.  Cardiovascular Exam: Regular rate and rhythm. S1, S2, no murmur, click, gallop, or rubs.  Gastrointestinal Exam: Soft, non-tender, no masses or organomegaly.  Lymphatic Exam: Non-palpable nodes in neck, clavicular regions.  Musculoskeletal Exam: Back is straight and non-tender, full ROM of upper and lower extremities.  Foot Exam: Left and right foot: good pedal pulses  Skin: no rash or abnormalities.  Scattered seborrheic keratoses are seen.  No worrisome skin lesions are noted  Neurologic Exam: Nonfocal, normal gross motor, tone coordination and no tremor.  Psychiatric Exam: Alert and oriented - appropriate affect.     Results for orders placed or performed in visit on 09/21/21   PSA tumor marker     Status: Abnormal   Result Value Ref Range    PSA Tumor Marker 6.26 (H) 0.00 - 4.00 ug/L   Lipid Profile     Status: None   Result Value Ref Range    Cholesterol 153 <200 mg/dL    Triglycerides 147 <150 mg/dL    Direct Measure HDL 59 23 - 92 mg/dL    LDL Cholesterol Calculated 65 <=100 mg/dL    Non HDL Cholesterol 94 <130 mg/dL    Patient Fasting > 8hrs? No     Narrative    Cholesterol  Desirable:  <200 mg/dL    Triglycerides  Normal:  Less than 150 mg/dL  Borderline High:  150-199 mg/dL  High:  200-499 mg/dL  Very High:  Greater than or equal to 500 mg/dL    Direct Measure HDL  Female:  Greater than or equal to 50 mg/dL   Male:  Greater than or equal to 40 mg/dL    LDL Cholesterol  Desirable:  <100mg/dL  Above Desirable:  100-129 mg/dL   Borderline High:  130-159  mg/dL   High:  160-189 mg/dL   Very High:  >= 190 mg/dL    Non HDL Cholesterol  Desirable:  130 mg/dL  Above Desirable:  130-159 mg/dL  Borderline High:  160-189 mg/dL  High:  190-219 mg/dL  Very High:  Greater than or equal to 220 mg/dL   Comprehensive metabolic panel     Status: Abnormal   Result Value Ref Range    Sodium 141 134 - 144 mmol/L    Potassium 4.2 3.5 - 5.1 mmol/L    Chloride 104 98 - 107 mmol/L    Carbon Dioxide (CO2) 29 21 - 31 mmol/L    Anion Gap 8 3 - 14 mmol/L    Urea Nitrogen 21 7 - 25 mg/dL    Creatinine 1.15 0.70 - 1.30 mg/dL    Calcium 9.6 8.6 - 10.3 mg/dL    Glucose 108 (H) 70 - 105 mg/dL    Alkaline Phosphatase 38 34 - 104 U/L    AST 23 13 - 39 U/L    ALT 14 7 - 52 U/L    Protein Total 6.8 6.4 - 8.9 g/dL    Albumin 4.3 3.5 - 5.7 g/dL    Bilirubin Total 0.6 0.3 - 1.0 mg/dL    GFR Estimate 62 >60 mL/min/1.73m2   CBC with platelets and differential     Status: None   Result Value Ref Range    WBC Count 6.3 4.0 - 11.0 10e3/uL    RBC Count 4.57 4.40 - 5.90 10e6/uL    Hemoglobin 14.4 13.3 - 17.7 g/dL    Hematocrit 42.6 40.0 - 53.0 %    MCV 93 78 - 100 fL    MCH 31.5 26.5 - 33.0 pg    MCHC 33.8 31.5 - 36.5 g/dL    RDW 12.4 10.0 - 15.0 %    Platelet Count 171 150 - 450 10e3/uL    % Neutrophils 75 %    % Lymphocytes 13 %    % Monocytes 10 %    % Eosinophils 1 %    % Basophils 1 %    % Immature Granulocytes 0 %    NRBCs per 100 WBC 0 <1 /100    Absolute Neutrophils 4.7 1.6 - 8.3 10e3/uL    Absolute Lymphocytes 0.8 0.8 - 5.3 10e3/uL    Absolute Monocytes 0.6 0.0 - 1.3 10e3/uL    Absolute Eosinophils 0.1 0.0 - 0.7 10e3/uL    Absolute Basophils 0.0 0.0 - 0.2 10e3/uL    Absolute Immature Granulocytes 0.0 <=0.0 10e3/uL    Absolute NRBCs 0.0 10e3/uL   CBC with Platelets & Differential     Status: None    Narrative    The following orders were created for panel order CBC with Platelets & Differential.  Procedure                               Abnormality         Status                     ---------                                -----------         ------                     CBC with platelets and d...[[id number]]                      Final result                 Please view results for these tests on the individual orders.      ASSESSMENT/Plan :    Chao was seen today for derm problem.    Diagnoses and all orders for this visit:    Essential hypertension, benign  -     CBC with Platelets & Differential; Future  -     Comprehensive metabolic panel; Future  -     Comprehensive metabolic panel  -     CBC with Platelets & Differential  -     hydrochlorothiazide (HYDRODIURIL) 12.5 MG tablet; TAKE 1 TABLET EVERY DAY  -     lisinopril (ZESTRIL) 40 MG tablet; TAKE 1 TABLET EVERY DAY    Needs flu shot  -     FLU SHOT 65+ (FLUZONE HD)    Hyperlipidemia, unspecified hyperlipidemia type  -     rosuvastatin (CRESTOR) 20 MG tablet; Take 1 tablet (20 mg) by mouth At Bedtime    Mixed hyperlipidemia  -     Comprehensive metabolic panel; Future  -     Lipid Profile; Future  -     Lipid Profile  -     Comprehensive metabolic panel    Prostate disorder  -     PSA tumor marker; Future  -     PSA tumor marker    Brain injury with loss of consciousness, sequela (H)  -     CBC with Platelets & Differential; Future  -     Comprehensive metabolic panel; Future  -     Comprehensive metabolic panel  -     CBC with Platelets & Differential    Benign non-nodular prostatic hyperplasia with lower urinary tract symptoms  -     tamsulosin (FLOMAX) 0.4 MG capsule; Take 2 capsules (0.8 mg) by mouth daily with food    Seborrheic keratosis      Discussed diet, exercise and healthy lifestyle changes. Will notify of lab results when available. Continue current medications.     Flu shot was given today.    Reassured about seborrheic keratoses.    Chan Cook MD

## 2021-09-21 NOTE — PATIENT INSTRUCTIONS
Your moles are called seborrheic keratoses. These are essentially age spots. (Like barnacles, these grow on old ships;)  They are not precancerous and require no treatment.     Will notify of lab results when available.

## 2021-09-22 ENCOUNTER — MYC MEDICAL ADVICE (OUTPATIENT)
Dept: FAMILY MEDICINE | Facility: OTHER | Age: 76
End: 2021-09-22

## 2021-09-22 DIAGNOSIS — N40.1 BENIGN NON-NODULAR PROSTATIC HYPERPLASIA WITH LOWER URINARY TRACT SYMPTOMS: ICD-10-CM

## 2021-09-22 DIAGNOSIS — E78.5 HYPERLIPIDEMIA, UNSPECIFIED HYPERLIPIDEMIA TYPE: ICD-10-CM

## 2021-09-22 DIAGNOSIS — I10 ESSENTIAL HYPERTENSION, BENIGN: ICD-10-CM

## 2021-09-22 RX ORDER — TAMSULOSIN HYDROCHLORIDE 0.4 MG/1
0.8 CAPSULE ORAL
Qty: 180 CAPSULE | Refills: 3 | Status: SHIPPED | OUTPATIENT
Start: 2021-09-22 | End: 2022-10-11

## 2021-09-22 RX ORDER — LISINOPRIL 40 MG/1
TABLET ORAL
Qty: 90 TABLET | Refills: 3 | Status: SHIPPED | OUTPATIENT
Start: 2021-09-22 | End: 2022-10-06

## 2021-09-22 RX ORDER — HYDROCHLOROTHIAZIDE 12.5 MG/1
TABLET ORAL
Qty: 90 TABLET | Refills: 3 | Status: SHIPPED | OUTPATIENT
Start: 2021-09-22 | End: 2022-10-11

## 2021-09-22 RX ORDER — ROSUVASTATIN CALCIUM 20 MG/1
20 TABLET, COATED ORAL AT BEDTIME
Qty: 90 TABLET | Refills: 3 | Status: ON HOLD | OUTPATIENT
Start: 2021-09-22 | End: 2022-05-17

## 2021-09-22 ASSESSMENT — ANXIETY QUESTIONNAIRES: GAD7 TOTAL SCORE: 0

## 2021-10-20 ENCOUNTER — MYC MEDICAL ADVICE (OUTPATIENT)
Dept: FAMILY MEDICINE | Facility: OTHER | Age: 76
End: 2021-10-20

## 2022-01-13 ENCOUNTER — MYC MEDICAL ADVICE (OUTPATIENT)
Dept: FAMILY MEDICINE | Facility: OTHER | Age: 77
End: 2022-01-13
Payer: COMMERCIAL

## 2022-01-13 DIAGNOSIS — M25.551 RIGHT HIP PAIN: Primary | ICD-10-CM

## 2022-01-25 ENCOUNTER — HOSPITAL ENCOUNTER (OUTPATIENT)
Dept: GENERAL RADIOLOGY | Facility: OTHER | Age: 77
Discharge: HOME | End: 2022-01-25
Attending: FAMILY MEDICINE | Admitting: FAMILY MEDICINE
Payer: MEDICARE

## 2022-01-25 DIAGNOSIS — M25.551 RIGHT HIP PAIN: ICD-10-CM

## 2022-01-25 PROCEDURE — 250N000009 HC RX 250: Performed by: STUDENT IN AN ORGANIZED HEALTH CARE EDUCATION/TRAINING PROGRAM

## 2022-01-25 PROCEDURE — 255N000002 HC RX 255 OP 636: Performed by: STUDENT IN AN ORGANIZED HEALTH CARE EDUCATION/TRAINING PROGRAM

## 2022-01-25 PROCEDURE — 250N000011 HC RX IP 250 OP 636: Performed by: STUDENT IN AN ORGANIZED HEALTH CARE EDUCATION/TRAINING PROGRAM

## 2022-01-25 PROCEDURE — 77002 NEEDLE LOCALIZATION BY XRAY: CPT

## 2022-01-25 RX ORDER — LIDOCAINE HYDROCHLORIDE 10 MG/ML
5 INJECTION, SOLUTION INFILTRATION; PERINEURAL ONCE
Status: COMPLETED | OUTPATIENT
Start: 2022-01-25 | End: 2022-01-25

## 2022-01-25 RX ORDER — BUPIVACAINE HYDROCHLORIDE 5 MG/ML
3 INJECTION, SOLUTION PERINEURAL ONCE
Status: COMPLETED | OUTPATIENT
Start: 2022-01-25 | End: 2022-01-25

## 2022-01-25 RX ORDER — TRIAMCINOLONE ACETONIDE 40 MG/ML
1 INJECTION, SUSPENSION INTRA-ARTICULAR; INTRAMUSCULAR ONCE
Status: COMPLETED | OUTPATIENT
Start: 2022-01-25 | End: 2022-01-25

## 2022-01-25 RX ADMIN — IOHEXOL 3 ML: 240 INJECTION, SOLUTION INTRATHECAL; INTRAVASCULAR; INTRAVENOUS; ORAL at 13:34

## 2022-01-25 RX ADMIN — TRIAMCINOLONE ACETONIDE 40 MG: 40 INJECTION, SUSPENSION INTRA-ARTICULAR; INTRAMUSCULAR at 13:35

## 2022-01-25 RX ADMIN — BUPIVACAINE HYDROCHLORIDE 15 MG: 5 INJECTION, SOLUTION EPIDURAL; INTRACAUDAL at 13:35

## 2022-01-25 RX ADMIN — LIDOCAINE HYDROCHLORIDE 3 ML: 10 INJECTION, SOLUTION INFILTRATION; PERINEURAL at 13:35

## 2022-02-08 ENCOUNTER — OFFICE VISIT (OUTPATIENT)
Dept: ORTHOPEDICS | Facility: OTHER | Age: 77
End: 2022-02-08
Attending: FAMILY MEDICINE
Payer: COMMERCIAL

## 2022-02-08 VITALS
SYSTOLIC BLOOD PRESSURE: 136 MMHG | HEART RATE: 60 BPM | DIASTOLIC BLOOD PRESSURE: 68 MMHG | WEIGHT: 190 LBS | BODY MASS INDEX: 28.89 KG/M2

## 2022-02-08 DIAGNOSIS — M25.551 RIGHT HIP PAIN: ICD-10-CM

## 2022-02-08 PROCEDURE — G0463 HOSPITAL OUTPT CLINIC VISIT: HCPCS

## 2022-02-08 PROCEDURE — 99203 OFFICE O/P NEW LOW 30 MIN: CPT | Performed by: ORTHOPAEDIC SURGERY

## 2022-02-08 NOTE — PROGRESS NOTES
Visit Date: 02/08/2022    REASON FOR EVALUATION:  Right hip pain.    HISTORY OF PRESENT ILLNESS:  Chao comes in with regards to his right hip.  He has been having ongoing pain here for the last several months, is getting worse in nature.  He does report he has a leg length difference here at this time, but he has had a lot of pelvic rotation.  He feels short on the right side and the left side does have a lift there to accommodate.  He had a pleural-based injection done 01/25/2022, and that has actually worked quite well for him.  He is surprised with regards to that.  He is feeling a lot better here.  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  He does report that the injection that he most recently had worked very well for him.  He did have x-rays updated as well.  I have reviewed that, does show severe arthritic changes in the hip joint itself.  Pain is with activity, better with rest.  He does occasionally use anti-inflammatories, but nothing of significance.    MEDICATIONS:  Reviewed.    ALLERGIES:  ALSO REVIEWED.    REVIEW OF SYSTEMS:  A 12-point otherwise negative with the exception stated above.    PHYSICAL EXAMINATION:  The patient is alert and oriented x3, cooperative with exam, in no distress.  Does walk some gait antalgia.  Affect appropriate.  He is 190 pounds.  Examination of the right hip shows hip flexion to 90, internal and external rotation of 10 and 10, abduction of 30.  Pain with hip flex and internal rotation.  Crepitus throughout that arc of motion.  Some swelling is present there as well.  Hip flexion strength is 5/5.  Dorsalis pedis pulse 2+.    IMAGING:  X-rays reviewed, shows moderate approaching a severe amount of hip arthritis at this time.  He does have some pelvic rotation.  Left total hip remains in good alignment and position.    IMPRESSION:    1.  Right hip arthrosis, severe.  2.  History of left total hip replacement.    PLAN:  At this time, I would say we  continue to wait out his time here in regards to the injection as long as that is helping.  We can certainly repeat that a couple times a year.  Certainly long-term management will consist of the need for joint reconstruction.  He is going to keep a watch on this at this point.  If he does need to proceed to that, he certainly wants it done, so he can heal up in time before hunting season comes forward at this point.  We will also get him set up here for physical therapy in addition to that.    Luis Miguel Lama MD        D: 2022   T: 2022   MT: CHIP    Name:     NABIL VILLANUEVA  MRN:      [id number]        Account:    [id number]   :      1945           Visit Date: 2022     Document: [id number]

## 2022-02-08 NOTE — PROGRESS NOTES
Patient is here for consult on his right hip pain.  Ngozi Plata LPN .....................2/8/2022 9:07 AM

## 2022-02-09 ENCOUNTER — MYC MEDICAL ADVICE (OUTPATIENT)
Dept: ORTHOPEDICS | Facility: OTHER | Age: 77
End: 2022-02-09
Payer: COMMERCIAL

## 2022-03-01 ENCOUNTER — HOSPITAL ENCOUNTER (OUTPATIENT)
Dept: PHYSICAL THERAPY | Facility: OTHER | Age: 77
Setting detail: THERAPIES SERIES
End: 2022-03-01
Attending: ORTHOPAEDIC SURGERY
Payer: MEDICARE

## 2022-03-01 DIAGNOSIS — M25.551 RIGHT HIP PAIN: ICD-10-CM

## 2022-03-01 PROCEDURE — 97110 THERAPEUTIC EXERCISES: CPT | Mod: GP

## 2022-03-01 PROCEDURE — 97161 PT EVAL LOW COMPLEX 20 MIN: CPT | Mod: GP

## 2022-03-02 ENCOUNTER — MYC MEDICAL ADVICE (OUTPATIENT)
Dept: FAMILY MEDICINE | Facility: OTHER | Age: 77
End: 2022-03-02
Payer: COMMERCIAL

## 2022-03-02 NOTE — PROGRESS NOTES
[facility]    OUTPATIENT PHYSICAL THERAPY ORTHOPEDIC EVALUATION  PLAN OF TREATMENT FOR OUTPATIENT REHABILITATION  (COMPLETE FOR INITIAL CLAIMS ONLY)  Patient's Last Name, First Name, M.I.  YOB: 1945  Chao Kearney    Provider s Name:  [facility]   Medical Record No.  [id number]   Start of Care Date:  03/01/22   Onset Date:   (has been going on for years)   Type:     _X__PT   ___OT   ___SLP Medical Diagnosis:  R hip pain M25.551     PT Diagnosis:  R hip weakness and HEP development   Visits from SOC:  1      _________________________________________________________________________________  Plan of Treatment/Functional Goals:  balance training, gait training, joint mobilization, manual therapy, motor coordination training, neuromuscular re-education, ROM, strengthening, stretching  HEP development  Hot packs, Cryotherapy, Electrical stimulation, Traction  vasopneumatic compression  Goals  Goal Identifier: ROM  Goal Description: Pt will present with 100 deg of B hip flexion to increase ROM for stepping over obstacles and decreasing falls risk during gait.   Target Date: 04/12/22    Goal Identifier: Endurance  Goal Description: pt will ambulate on treadmill at 3% incline for 6 min and 1.5 speed or higher in order to improve endurance for hunting and fishing.   Target Date: 04/26/22    Goal Identifier: Strength   Goal Description: Pt will demonstrate ability to complete 10 reps of full range sidelying hip ABD without compensations to increase strength for gait stability.   Target Date: 03/29/22    Goal Identifier: Balance  Goal Description: Pt will demonstrate ability to complete B tandem stance for 30 sec without UE assist in order to progress static stablity for walking on uneven ground in the woods.   Target Date: 04/27/22     Therapy Frequency:  2  times/Week  Predicted Duration of Therapy Intervention:  8 weeks (HEP development impacted by poor memory)    MARK BelleT                 I CERTIFY THE NEED FOR THESE SERVICES FURNISHED UNDER        THIS PLAN OF TREATMENT AND WHILE UNDER MY CARE     (Physician co-signature of this document indicates review and certification of the therapy plan).                       Certification Date From:  03/01/22   Certification Date To:  04/26/22    Referring Provider:  Dr. Lama    Initial Assessment        See Epic Evaluation Start of Care Date: 03/01/22

## 2022-03-02 NOTE — PROGRESS NOTES
03/01/22 0900   General Information   Type of Visit Initial OP Ortho PT Evaluation   Start of Care Date 03/01/22   Referring Physician Dr. Lama   Patient/Family Goals Statement pt would like to improve function to prevent R hip replacement as long as possible.    Orders Evaluate and Treat   Date of Order 02/08/22   Certification Required? Yes   Medical Diagnosis R hip pain M25.551   Surgical/Medical history reviewed Yes   Precautions/Limitations no known precautions/limitations   General Information Comments Pt is a 76 year old male referred to skilled PT services following an increase in pain with chronic R hip pain while hunting and fishing. Pt recently had injection in the last month to R hip and has had no pain since injection but feels weak and stiff and wants to be ready for hunting this fall. Pt reports some pain with squating and walking on uneven surfaces but overall is feeling better. pt would like an HEP to help prevent future decline.    Presentation and Etiology   Pertinent history of current problem (include personal factors and/or comorbidities that impact the POC) PMH: poor short term memory, brain bleed (many years ago), HTN, L RICKY   Impairments E. Decreased flexibility;F. Decreased strength and endurance;G. Impaired balance   Functional Limitations perform activities of daily living;perform desired leisure / sports activities   Symptom Location R hip    How/Where did it occur From Degenerative Joint Disease   Onset date of current episode/exacerbation   (has been going on for years)   Chronicity Chronic   Pain rating (0-10 point scale) Best (/10);Worst (/10)   Best (/10) 1/10   Worst (/10) 3/10   Pain quality B. Dull   Frequency of pain/symptoms C. With activity   Pain/symptoms are: Worse during the night   Pain/symptoms exacerbated by A. Sitting;F. Nothing   Pain/symptoms eased by B. Walking;F. Certain positions;H. Cold   Progression of symptoms since onset: Improved  (since injection)    Current / Previous Interventions   X-ray Results Results   X-ray results arthritic changes and narrowing joint space   Prior Level of Function   Prior Level of Function-Mobility independent   Prior Level of Function-ADLs independent   Functional Level Prior Comment pt lives with wife in home, is active with hunting and fishing but does have poor memory   Current Level of Function   Current Community Support Family/friend caregiver   Patient role/employment history F. Retired   Living environment House/townhome   Home/community accessibility pt lives with wife, hunts and fishes on unstable surfaces.    Current equipment-Gait/Locomotion None   Current equipment-ADL None   Fall Risk Screen   Fall screen completed by PT   Have you fallen 2 or more times in the past year? No   Have you fallen and had an injury in the past year? No   Is patient a fall risk? No   Abuse Screen (yes response referral indicated)   Feels Unsafe at Home or Work/School no   Feels Threatened by Someone no   Does Anyone Try to Keep You From Having Contact with Others or Doing Things Outside Your Home? no   Physical Signs of Abuse Present no   Patient needs abuse support services and resources No   Hip Objective Findings   Side (if bilateral, select both right and left) Right   Observation R leg is shorter than L, pt weared orthotics and has lift on shoe   Gait/Locomotion increased lateral sway, no AD and B foot clearance    Balance/Proprioception (Single Leg Stance) able to stand B for about 2 sec without UE assist, 15 sec in tandem stance   Hip ROM Comments pt kept repeating his ROM has been poor since childhood, is very stiff in both hips   Lumbar ROM WFL   Pelvic Screen not formally assessed, has history of pelvic rotation   Functional Closed Chain Screen no pain with squating   Hip/Knee Strength Comments L hip grossly 4+/5   Hip Flexibility Comments pt limited B   Scour Test positive on R   MARYLIN Test positive    FADIR Test positive    Hip  Special Tests Comments pt reporting tightness and pain with MARYLIN/FADIR is normal for him due to tightness   Palpation increased tissue tension in anterior and posterior aspects of hip but is not painful    Accessory Motion/Joint Mobility decreased inferior and posterior motion but not painful    Right Hip Flexion PROM 90 deg (pt limited B)   Right Hip Abduction PROM 10 deg   Right Hip Adduction PROM 8 deg   Right Hip Ext PROM 10 deg   Right Hip Flexion Strength 4-/5   Right Hip Abduction Strength 4/5   Right Hip Extension Strength 3/5   Right Knee Flexion Strength 4/5   Right Knee Extension Strength 4/5    Flexibility Test limited    Obers/ITB Flexibility limited   Right Hamstring Flexibility limited   Right Piriformis Flexibility limited   Right Prone Quad Flexibility limited   Planned Therapy Interventions   Planned Therapy Interventions balance training;gait training;joint mobilization;manual therapy;motor coordination training;neuromuscular re-education;ROM;strengthening;stretching   Planned Therapy Interventions Comment HEP development   Planned Modality Interventions   Planned Modality Interventions Hot packs;Cryotherapy;Electrical stimulation;Traction   Planned Modality Interventions Comments vasopneumatic compression   Clinical Impression   Criteria for Skilled Therapeutic Interventions Met yes, treatment indicated   PT Diagnosis R hip weakness and HEP development   Influenced by the following impairments weakness, limited tissue and joint motion, poor motor control, poor memory   Functional limitations due to impairments walking on unstable surfaces, stairs, gait    Clinical Presentation Stable/Uncomplicated   Clinical Presentation Rationale symptoms are consistent with joint degeneration, pt would like to postpone hip replacement as long as possible    Clinical Decision Making (Complexity) Low complexity   Therapy Frequency 2 times/Week   Predicted Duration of Therapy Intervention (days/wks) 8  weeks  (HEP development impacted by poor memory)   Risk & Benefits of therapy have been explained Yes   Patient, Family & other staff in agreement with plan of care Yes   Clinical Impression Comments Pt is a 76 year old male referred to skilled PT services following hip injections and decrease in pain in order to develop ROM and strength program to prevent RICKY for as long as possible. Pt presents with increased weakness, poor motor control, and poor balance on the R hip impacting safety when walking outdoors and hunting. pt can benefit from skilled PT services to address these deficits and develop HEP to maintain pt function.    Education Assessment   Preferred Learning Style Listening;Reading   Barriers to Learning Cognitive   ORTHO GOALS   PT Ortho Eval Goals 1;2;3;4   Ortho Goal 1   Goal Identifier ROM   Goal Description Pt will present with 100 deg of B hip flexion to increase ROM for stepping over obstacles and decreasing falls risk during gait.    Target Date 04/12/22   Ortho Goal 2   Goal Identifier Endurance   Goal Description pt will ambulate on treadmill at 3% incline for 6 min and 1.5 speed or higher in order to improve endurance for hunting and fishing.    Target Date 04/26/22   Ortho Goal 3   Goal Identifier Strength    Goal Description Pt will demonstrate ability to complete 10 reps of full range sidelying hip ABD without compensations to increase strength for gait stability.    Target Date 03/29/22   Ortho Goal 4   Goal Identifier Balance   Goal Description Pt will demonstrate ability to complete B tandem stance for 30 sec without UE assist in order to progress static stablity for walking on uneven ground in the woods.    Target Date 04/27/22   Total Evaluation Time   PT Candace Low Complexity Minutes (65571) 20   Therapy Certification   Certification date from 03/01/22   Certification date to 04/26/22   Medical Diagnosis R hip pain M25.551

## 2022-03-03 ENCOUNTER — HOSPITAL ENCOUNTER (OUTPATIENT)
Dept: PHYSICAL THERAPY | Facility: OTHER | Age: 77
Setting detail: THERAPIES SERIES
End: 2022-03-03
Attending: ORTHOPAEDIC SURGERY
Payer: MEDICARE

## 2022-03-03 PROCEDURE — 97110 THERAPEUTIC EXERCISES: CPT | Mod: GP,CQ

## 2022-03-08 ENCOUNTER — HOSPITAL ENCOUNTER (OUTPATIENT)
Dept: PHYSICAL THERAPY | Facility: OTHER | Age: 77
Setting detail: THERAPIES SERIES
End: 2022-03-08
Attending: ORTHOPAEDIC SURGERY
Payer: MEDICARE

## 2022-03-08 PROCEDURE — 97110 THERAPEUTIC EXERCISES: CPT | Mod: GP

## 2022-03-12 ENCOUNTER — MYC MEDICAL ADVICE (OUTPATIENT)
Dept: ORTHOPEDICS | Facility: OTHER | Age: 77
End: 2022-03-12
Payer: COMMERCIAL

## 2022-03-14 DIAGNOSIS — M25.551 RIGHT HIP PAIN: Primary | ICD-10-CM

## 2022-03-15 ENCOUNTER — HOSPITAL ENCOUNTER (OUTPATIENT)
Dept: PHYSICAL THERAPY | Facility: OTHER | Age: 77
Setting detail: THERAPIES SERIES
Discharge: HOME | End: 2022-03-15
Attending: ORTHOPAEDIC SURGERY
Payer: MEDICARE

## 2022-03-15 PROCEDURE — 97110 THERAPEUTIC EXERCISES: CPT | Mod: GP

## 2022-03-17 ENCOUNTER — HOSPITAL ENCOUNTER (OUTPATIENT)
Dept: PHYSICAL THERAPY | Facility: OTHER | Age: 77
Setting detail: THERAPIES SERIES
Discharge: HOME | End: 2022-03-17
Attending: ORTHOPAEDIC SURGERY
Payer: MEDICARE

## 2022-03-17 PROCEDURE — 97110 THERAPEUTIC EXERCISES: CPT | Mod: GP

## 2022-03-17 PROCEDURE — 97112 NEUROMUSCULAR REEDUCATION: CPT | Mod: GP

## 2022-04-07 NOTE — PROGRESS NOTES
[facility]    Outpatient Physical Therapy Discharge Note  Patient: Chao Kearney  : 1945    Beginning/End Dates of Reporting Period:  3/1/22 to 3/17/22    Referring Provider: Dr. Lama    Therapy Diagnosis: R hip weakness and HEP development     Client Self Report: Pt reports his grandson is going home today. Had a nice visit and pt continues to walk daily. Pt is now having hip replaced on May 17th, no longer needs therapy as he is addressing deficits through HEP and hip replacement    Objective Measurements:  Objective Measure: ROM  Details: see eval  Objective Measure: strength   Details: 15 sit<>stands with UE assist and poor technique    Goals:  Goal Identifier ROM   Goal Description Pt will present with 100 deg of B hip flexion to increase ROM for stepping over obstacles and decreasing falls risk during gait.    Target Date 22   Date Met      Progress (detail required for progress note): (P) Not met, pt does not complete RoM at home consistently      Goal Identifier Endurance   Goal Description pt will ambulate on treadmill at 3% incline for 6 min and 1.5 speed or higher in order to improve endurance for hunting and fishing.    Target Date 22   Date Met      Progress (detail required for progress note): (P) progressing, increased tolerance to walking and walking outdoors.      Goal Identifier Strength    Goal Description Pt will demonstrate ability to complete 10 reps of full range sidelying hip ABD without compensations to increase strength for gait stability.    Target Date 22   Date Met  (P) 22   Progress (detail required for progress note): (P) MET     Goal Identifier Balance   Goal Description Pt will demonstrate ability to complete B tandem stance for 30 sec without UE assist in order to progress static stablity for walking on uneven ground in the woods.    Target  Date 04/27/22   Date Met      Progress (detail required for progress note): (P) Progressing, still needing UE assist.      Plan:  Discharge from therapy.    Discharge:    Reason for Discharge: No further expectation of progress.    Equipment Issued: none    Discharge Plan: pt to have hip replacement in may

## 2022-04-26 ENCOUNTER — MYC MEDICAL ADVICE (OUTPATIENT)
Dept: FAMILY MEDICINE | Facility: OTHER | Age: 77
End: 2022-04-26
Payer: COMMERCIAL

## 2022-04-26 NOTE — TELEPHONE ENCOUNTER
After the patient's name and date of birth was verified, the patient was told the below information.  Mandy Mcfarlane LPN..................4/26/2022   2:52 PM

## 2022-04-26 NOTE — TELEPHONE ENCOUNTER
He is having surgery on his hip May 17 th. He is wanting to inject it before the surgery as it is hurting him. I told him I do not think he can inject it right before surgery but I would check with you.  Mandy Mcfarlane LPN..................4/26/2022   11:55 AM

## 2022-04-26 NOTE — TELEPHONE ENCOUNTER
I think the recommendation would be not to do an injection this close to surgery.  Chan Cook MD on 4/26/2022 at 12:39 PM

## 2022-05-03 ENCOUNTER — OFFICE VISIT (OUTPATIENT)
Dept: FAMILY MEDICINE | Facility: OTHER | Age: 77
End: 2022-05-03
Attending: FAMILY MEDICINE
Payer: COMMERCIAL

## 2022-05-03 VITALS
DIASTOLIC BLOOD PRESSURE: 70 MMHG | RESPIRATION RATE: 16 BRPM | BODY MASS INDEX: 29.66 KG/M2 | HEIGHT: 67 IN | TEMPERATURE: 97.1 F | WEIGHT: 189 LBS | OXYGEN SATURATION: 99 % | SYSTOLIC BLOOD PRESSURE: 124 MMHG | HEART RATE: 69 BPM

## 2022-05-03 DIAGNOSIS — S06.9X9S BRAIN INJURY WITH LOSS OF CONSCIOUSNESS, SEQUELA (H): ICD-10-CM

## 2022-05-03 DIAGNOSIS — M16.11 PRIMARY OSTEOARTHRITIS OF RIGHT HIP: ICD-10-CM

## 2022-05-03 DIAGNOSIS — E78.5 HYPERLIPIDEMIA, UNSPECIFIED HYPERLIPIDEMIA TYPE: ICD-10-CM

## 2022-05-03 DIAGNOSIS — I10 ESSENTIAL HYPERTENSION, BENIGN: ICD-10-CM

## 2022-05-03 DIAGNOSIS — F41.1 GENERALIZED ANXIETY DISORDER: ICD-10-CM

## 2022-05-03 DIAGNOSIS — Z01.818 PREOP GENERAL PHYSICAL EXAM: Primary | ICD-10-CM

## 2022-05-03 LAB
ANION GAP SERPL CALCULATED.3IONS-SCNC: 5 MMOL/L (ref 3–14)
ATRIAL RATE - MUSE: 62 BPM
BASOPHILS # BLD AUTO: 0 10E3/UL (ref 0–0.2)
BASOPHILS NFR BLD AUTO: 1 %
BUN SERPL-MCNC: 19 MG/DL (ref 7–25)
CALCIUM SERPL-MCNC: 9.3 MG/DL (ref 8.6–10.3)
CHLORIDE BLD-SCNC: 103 MMOL/L (ref 98–107)
CO2 SERPL-SCNC: 31 MMOL/L (ref 21–31)
CREAT SERPL-MCNC: 1.19 MG/DL (ref 0.7–1.3)
DIASTOLIC BLOOD PRESSURE - MUSE: NORMAL MMHG
EOSINOPHIL # BLD AUTO: 0.1 10E3/UL (ref 0–0.7)
EOSINOPHIL NFR BLD AUTO: 1 %
ERYTHROCYTE [DISTWIDTH] IN BLOOD BY AUTOMATED COUNT: 12.1 % (ref 10–15)
GFR SERPL CREATININE-BSD FRML MDRD: 63 ML/MIN/1.73M2
GLUCOSE BLD-MCNC: 110 MG/DL (ref 70–105)
HCT VFR BLD AUTO: 42.9 % (ref 40–53)
HGB BLD-MCNC: 14.6 G/DL (ref 13.3–17.7)
IMM GRANULOCYTES # BLD: 0 10E3/UL
IMM GRANULOCYTES NFR BLD: 1 %
INTERPRETATION ECG - MUSE: NORMAL
LYMPHOCYTES # BLD AUTO: 0.9 10E3/UL (ref 0.8–5.3)
LYMPHOCYTES NFR BLD AUTO: 16 %
MCH RBC QN AUTO: 31.1 PG (ref 26.5–33)
MCHC RBC AUTO-ENTMCNC: 34 G/DL (ref 31.5–36.5)
MCV RBC AUTO: 91 FL (ref 78–100)
MONOCYTES # BLD AUTO: 0.5 10E3/UL (ref 0–1.3)
MONOCYTES NFR BLD AUTO: 10 %
NEUTROPHILS # BLD AUTO: 4.1 10E3/UL (ref 1.6–8.3)
NEUTROPHILS NFR BLD AUTO: 71 %
NRBC # BLD AUTO: 0 10E3/UL
NRBC BLD AUTO-RTO: 0 /100
P AXIS - MUSE: 47 DEGREES
PLATELET # BLD AUTO: 181 10E3/UL (ref 150–450)
POTASSIUM BLD-SCNC: 3.9 MMOL/L (ref 3.5–5.1)
PR INTERVAL - MUSE: 170 MS
QRS DURATION - MUSE: 92 MS
QT - MUSE: 392 MS
QTC - MUSE: 397 MS
R AXIS - MUSE: -19 DEGREES
RBC # BLD AUTO: 4.7 10E6/UL (ref 4.4–5.9)
SODIUM SERPL-SCNC: 139 MMOL/L (ref 134–144)
SYSTOLIC BLOOD PRESSURE - MUSE: NORMAL MMHG
T AXIS - MUSE: 38 DEGREES
VENTRICULAR RATE- MUSE: 62 BPM
WBC # BLD AUTO: 5.7 10E3/UL (ref 4–11)

## 2022-05-03 PROCEDURE — 80048 BASIC METABOLIC PNL TOTAL CA: CPT | Mod: ZL | Performed by: FAMILY MEDICINE

## 2022-05-03 PROCEDURE — 85025 COMPLETE CBC W/AUTO DIFF WBC: CPT | Mod: ZL | Performed by: FAMILY MEDICINE

## 2022-05-03 PROCEDURE — 93005 ELECTROCARDIOGRAM TRACING: CPT | Performed by: FAMILY MEDICINE

## 2022-05-03 PROCEDURE — 36415 COLL VENOUS BLD VENIPUNCTURE: CPT | Mod: ZL | Performed by: FAMILY MEDICINE

## 2022-05-03 PROCEDURE — G0463 HOSPITAL OUTPT CLINIC VISIT: HCPCS | Mod: 25

## 2022-05-03 PROCEDURE — 93010 ELECTROCARDIOGRAM REPORT: CPT | Performed by: INTERNAL MEDICINE

## 2022-05-03 PROCEDURE — G0463 HOSPITAL OUTPT CLINIC VISIT: HCPCS

## 2022-05-03 PROCEDURE — 99214 OFFICE O/P EST MOD 30 MIN: CPT | Performed by: FAMILY MEDICINE

## 2022-05-03 ASSESSMENT — ANXIETY QUESTIONNAIRES
7. FEELING AFRAID AS IF SOMETHING AWFUL MIGHT HAPPEN: NOT AT ALL
GAD7 TOTAL SCORE: 0
6. BECOMING EASILY ANNOYED OR IRRITABLE: NOT AT ALL
4. TROUBLE RELAXING: NOT AT ALL
3. WORRYING TOO MUCH ABOUT DIFFERENT THINGS: NOT AT ALL
2. NOT BEING ABLE TO STOP OR CONTROL WORRYING: NOT AT ALL
GAD7 TOTAL SCORE: 0
1. FEELING NERVOUS, ANXIOUS, OR ON EDGE: NOT AT ALL
5. BEING SO RESTLESS THAT IT IS HARD TO SIT STILL: NOT AT ALL
7. FEELING AFRAID AS IF SOMETHING AWFUL MIGHT HAPPEN: NOT AT ALL

## 2022-05-03 ASSESSMENT — PAIN SCALES - GENERAL: PAINLEVEL: NO PAIN (0)

## 2022-05-03 NOTE — H&P (VIEW-ONLY)
[facility] AND [facility]  [address]  [place] [address]  Phone: [phone]  Fax: [phone]  Primary Provider: Waldo Cook  Pre-op Performing Provider: WALDO COOK      PREOPERATIVE EVALUATION:  Today's date: 5/3/2022    Chao Kearney is a 76 year old male who presents for a preoperative evaluation.    Surgical Information:  Surgery/Procedure: Right RICKY  Surgery Location: [facility]  Surgeon: Dr Lama  Surgery Date: 05/17/2022  Time of Surgery: TBD  Where patient plans to recover: At home with family  Fax number for surgical facility: Note does not need to be faxed, will be available electronically in Epic.    Type of Anesthesia Anticipated: to be determined    Chao was seen today for pre-op exam.    Diagnoses and all orders for this visit:    Preop general physical exam  -     EKG 12-lead, tracing only (Same Day)  -     CBC with Platelets & Differential; Future  -     Basic metabolic panel; Future  -     Basic metabolic panel  -     CBC with Platelets & Differential    Primary osteoarthritis of right hip  -     CBC with Platelets & Differential; Future  -     Basic metabolic panel; Future  -     Basic metabolic panel  -     CBC with Platelets & Differential    Essential hypertension, benign  -     EKG 12-lead, tracing only (Same Day)  -     Basic metabolic panel; Future  -     Basic metabolic panel    Hyperlipidemia, unspecified hyperlipidemia type    Generalized anxiety disorder    Brain injury with loss of consciousness, sequela (H)       Okay to go ahead with planned procedure. Stop aspirin 1 week prior to surgery.  OK to take regular medications with a sip of water the  morning of surgery.     Subjective     HPI related to upcoming procedure: Right RICKY.  He had his left done quite a while ago and did well with that.  His right has bothered him enough now that he wants to move ahead.  He saw Dr. Lama who agrees.  He does have a leg length discrepancy with his right  leg being shorter than his left.  He is hoping that this can be corrected with his surgery.    Preop Questions 5/3/2022   1. Have you ever had a heart attack or stroke? No   2. Have you ever had surgery on your heart or blood vessels, such as a stent placement, a coronary artery bypass, or surgery on an artery in your head, neck, heart, or legs? No   3. Do you have chest pain with activity? No   4. Do you have a history of  heart failure? No   5. Do you currently have a cold, bronchitis or symptoms of other infection? No   6. Do you have a cough, shortness of breath, or wheezing? No   7. Do you or anyone in your family have previous history of blood clots? No   8. Do you or does anyone in your family have a serious bleeding problem such as prolonged bleeding following surgeries or cuts? No   9. Have you ever had problems with anemia or been told to take iron pills? No   10. Have you had any abnormal blood loss such as black, tarry or bloody stools? No   11. Have you ever had a blood transfusion? No   12. Are you willing to have a blood transfusion if it is medically needed before, during, or after your surgery? Yes   13. Have you or any of your relatives ever had problems with anesthesia? No   14. Do you have sleep apnea, excessive snoring or daytime drowsiness? No   15. Do you have any artifical heart valves or other implanted medical devices like a pacemaker, defibrillator, or continuous glucose monitor? No   16. Do you have artificial joints? YES - Left hip   17. Are you allergic to latex? No       Health Care Directive:  Patient does not have a Health Care Directive or Living Will: Patient states has Advance Directive and will bring in a copy to clinic.    Preoperative Review of :   reviewed - no record of controlled substances prescribed.  PDMP Review       Value Time User    State PDMP site checked  Yes 5/3/2022  9:28 AM Chan Cook MD           Status of Chronic Conditions:  HYPERLIPIDEMIA -  Patient has a long history of significant Hyperlipidemia requiring medication for treatment with recent good control. Patient reports no problems or side effects with the medication.     HYPERTENSION - Patient has longstanding history of HTN , currently denies any symptoms referable to elevated blood pressure. Specifically denies chest pain, palpitations, dyspnea, orthopnea, PND or peripheral edema. Blood pressure readings have been in normal range. Current medication regimen is as listed below. Patient denies any side effects of medication.       Review of Systems  Constitutional, neuro, ENT, endocrine, pulmonary, cardiac, gastrointestinal, genitourinary, musculoskeletal, integument and psychiatric systems are negative, except as otherwise noted.    Patient Active Problem List    Diagnosis Date Noted     Generalized anxiety disorder 01/17/2018     Priority: Medium     Elevated prostate specific antigen (PSA) 01/17/2018     Priority: Medium     Overview:   in October 2005.  The patient is taking Levaquin daily for two months.  He   will have another PSA checked in October 2006, recommended by Dr. Wilson.       Hyperlipidemia 01/17/2018     Priority: Medium     Hypertension 01/17/2018     Priority: Medium     Plantar fasciitis 01/17/2018     Priority: Medium     Overview:   right       Benign non-nodular prostatic hyperplasia with lower urinary tract symptoms 07/27/2016     Priority: Medium     Brain injury (H) 01/16/2015     Priority: Medium     Retention of urine 12/12/2014     Priority: Medium     Right hip pain 10/10/2014     Priority: Medium     Anemia 09/22/2014     Priority: Medium     Hydrocephalus (H) 09/22/2014     Priority: Medium     SAH (subarachnoid hemorrhage) (H) 09/22/2014     Priority: Medium     Actinic keratosis 01/28/2014     Priority: Medium     Seborrheic keratosis 01/28/2014     Priority: Medium     H/O adenomatous polyp of colon 04/27/2010     Priority: Medium     Diverticulosis of sigmoid  colon 04/26/2010     Priority: Medium      Past Medical History:   Diagnosis Date     Actinic keratosis     No Comments Provided     Anemia     No Comments Provided     Diverticulosis of large intestine without perforation or abscess without bleeding     No Comments Provided     Elevated prostate specific antigen (PSA)     No Comments Provided     Essential (primary) hypertension     No Comments Provided     Generalized anxiety disorder     No Comments Provided     History of colonic polyps     No Comments Provided     Hydrocephalus (H)     (obstructive)     Hyperlipidemia     No Comments Provided     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 Marin 1/24/05     Nontraumatic subarachnoid hemorrhage (H)     No Comments Provided     Osteoarthritis of hip     No Comments Provided     Other seborrheic keratosis     No Comments Provided     Other specified disorders of bone, unspecified site (CODE)     1/05,Lingual sean, bony growth, lower mandible, evaluation Dr. Marin 1/05     Pain in right hip     No Comments Provided     Personal history of other medical treatment (CODE)     05/16/05,Q wave in 3 and AVF leads on electrocardiogram done     Plantar fascial fibromatosis     No Comments Provided     Retention of urine     No Comments Provided     Screening for other and unspecified cardiovascular conditions     05/20/2005,Stress echocardiogram is negative for ischemia with ejection fraction of 85%     Stiffness of unspecified joint, not elsewhere classified     No Comments Provided     Past Surgical History:   Procedure Laterality Date     COLONOSCOPY  04/26/2010    (04/26/2010),F/U 2015     COLONOSCOPY  10/15/2015    10/15/15,F/U 2025     EXTRACAPSULAR CATARACT EXTRATION WITH INTRAOCULAR LENS IMPLANT      2016     OTHER SURGICAL HISTORY      3/26/14,01243.0,IA TOTAL HIP ARTHROPLASTY,Left,direct  anterior approach, Dr. Lama     OTHER SURGICAL HISTORY      ,[id number],CRANIOTOMY     OTHER SURGICAL HISTORY      2016,,HERNIA REPAIR,Left,LIH with mesh     Current Outpatient Medications   Medication Sig Dispense Refill     aspirin EC 81 MG EC tablet Take 81 mg by mouth daily with food       Cholecalciferol (VITAMIN D3) 1000 UNITS CAPS Take 1 capsule by mouth daily       hydrochlorothiazide (HYDRODIURIL) 12.5 MG tablet TAKE 1 TABLET EVERY DAY 90 tablet 3     lisinopril (ZESTRIL) 40 MG tablet TAKE 1 TABLET EVERY DAY 90 tablet 3     meclizine (ANTIVERT) 12.5 MG tablet TAKE 2 TABLETS(25 MG) BY MOUTH FOUR TIMES DAILY AS NEEDED FOR DIZZINESS 20 tablet 3     Multiple Vitamins-Minerals (MULTILEX-T&M) TABS Take 1 tablet by mouth daily       rosuvastatin (CRESTOR) 20 MG tablet Take 1 tablet (20 mg) by mouth At Bedtime 90 tablet 3     tamsulosin (FLOMAX) 0.4 MG capsule Take 2 capsules (0.8 mg) by mouth daily with food 180 capsule 3     triamcinolone (KENALOG) 0.1 % external cream Apply topically to affected area(s) 3 times daily. 30 g 1       Allergies   Allergen Reactions     Fluress         Social History     Tobacco Use     Smoking status: Former Smoker     Quit date: 1970     Years since quittin.3     Smokeless tobacco: Never Used     Tobacco comment: smoked off and on for 2 years   Substance Use Topics     Alcohol use: Yes     Alcohol/week: 2.0 standard drinks     Types: 2 Cans of beer per week     Comment: 2 times a week/2 drinks     Family History   Problem Relation Age of Onset     Cancer Mother 78        Cancer, lung cancer at age 78, developed brain metastases     Heart Disease Father 50        Heart Disease,heart failure     Other - See Comments Father          MS, long-standing     Other - See Comments Sister          MS     Cancer Paternal Grandfather         Cancer, of cancer of unknown type     History   Drug Use No     Comment: Drug use: No         Objective     /70   Pulse  "69   Temp 97.1  F (36.2  C) (Temporal)   Resp 16   Ht 1.702 m (5' 7\")   Wt 85.7 kg (189 lb)   SpO2 99%   BMI 29.60 kg/m      Physical Exam  `````````````````````````````````````````````````````````````````````````````````````````````````````````````````````````````````````````````````````````````````    Recent Labs   Lab Test 09/21/21  0952 10/20/20  1124   HGB 14.4 14.7    204    142   POTASSIUM 4.2 4.3   CR 1.15 1.11        Diagnostics:  Recent Results (from the past 24 hour(s))   Basic metabolic panel    Collection Time: 05/03/22  9:56 AM   Result Value Ref Range    Sodium 139 134 - 144 mmol/L    Potassium 3.9 3.5 - 5.1 mmol/L    Chloride 103 98 - 107 mmol/L    Carbon Dioxide (CO2) 31 21 - 31 mmol/L    Anion Gap 5 3 - 14 mmol/L    Urea Nitrogen 19 7 - 25 mg/dL    Creatinine 1.19 0.70 - 1.30 mg/dL    Calcium 9.3 8.6 - 10.3 mg/dL    Glucose 110 (H) 70 - 105 mg/dL    GFR Estimate 63 >60 mL/min/1.73m2   CBC with platelets and differential    Collection Time: 05/03/22  9:56 AM   Result Value Ref Range    WBC Count 5.7 4.0 - 11.0 10e3/uL    RBC Count 4.70 4.40 - 5.90 10e6/uL    Hemoglobin 14.6 13.3 - 17.7 g/dL    Hematocrit 42.9 40.0 - 53.0 %    MCV 91 78 - 100 fL    MCH 31.1 26.5 - 33.0 pg    MCHC 34.0 31.5 - 36.5 g/dL    RDW 12.1 10.0 - 15.0 %    Platelet Count 181 150 - 450 10e3/uL    % Neutrophils 71 %    % Lymphocytes 16 %    % Monocytes 10 %    % Eosinophils 1 %    % Basophils 1 %    % Immature Granulocytes 1 %    NRBCs per 100 WBC 0 <1 /100    Absolute Neutrophils 4.1 1.6 - 8.3 10e3/uL    Absolute Lymphocytes 0.9 0.8 - 5.3 10e3/uL    Absolute Monocytes 0.5 0.0 - 1.3 10e3/uL    Absolute Eosinophils 0.1 0.0 - 0.7 10e3/uL    Absolute Basophils 0.0 0.0 - 0.2 10e3/uL    Absolute Immature Granulocytes 0.0 <=0.4 10e3/uL    Absolute NRBCs 0.0 10e3/uL   EKG 12-lead, tracing only (Same Day)    Collection Time: 05/03/22 10:00 AM   Result Value Ref Range    Systolic Blood Pressure  mmHg    Diastolic Blood " Pressure  mmHg    Ventricular Rate 62 BPM    Atrial Rate 62 BPM    VA Interval 170 ms    QRS Duration 92 ms     ms    QTc 397 ms    P Axis 47 degrees    R AXIS -19 degrees    T Axis 38 degrees    Interpretation ECG       Sinus rhythm with Premature atrial complexes  Inferior infarct , age undetermined  Abnormal ECG  When compared with ECG of 20-OCT-2020 11:52,  Incomplete right bundle branch block is no longer Present        EKG: Normal Sinus Rhythm, normal axis, normal intervals, no acute ST/T changes c/w ischemia, no LVH by voltage criteria, unchanged from previous tracings    Revised Cardiac Risk Index (RCRI):  The patient has the following serious cardiovascular risks for perioperative complications:   - Cerebrovascular Disease (TIA or CVA) = 1 point     RCRI Interpretation: 1 point: Class II (low risk - 0.9% complication rate)           Signed Electronically by: Chan Cook MD  Copy of this evaluation report is provided to requesting physician.      Answers for HPI/ROS submitted by the patient on 5/3/2022  EDDIE 7 TOTAL SCORE: 0

## 2022-05-03 NOTE — PROGRESS NOTES
[facility] AND [facility]  [address]  [place] [address]  Phone: [phone]  Fax: [phone]  Primary Provider: Waldo Cook  Pre-op Performing Provider: WALDO COOK      PREOPERATIVE EVALUATION:  Today's date: 5/3/2022    Chao Kearney is a 76 year old male who presents for a preoperative evaluation.    Surgical Information:  Surgery/Procedure: Right RICKY  Surgery Location: [facility]  Surgeon: Dr Lama  Surgery Date: 05/17/2022  Time of Surgery: TBD  Where patient plans to recover: At home with family  Fax number for surgical facility: Note does not need to be faxed, will be available electronically in Epic.    Type of Anesthesia Anticipated: to be determined    Chao was seen today for pre-op exam.    Diagnoses and all orders for this visit:    Preop general physical exam  -     EKG 12-lead, tracing only (Same Day)  -     CBC with Platelets & Differential; Future  -     Basic metabolic panel; Future  -     Basic metabolic panel  -     CBC with Platelets & Differential    Primary osteoarthritis of right hip  -     CBC with Platelets & Differential; Future  -     Basic metabolic panel; Future  -     Basic metabolic panel  -     CBC with Platelets & Differential    Essential hypertension, benign  -     EKG 12-lead, tracing only (Same Day)  -     Basic metabolic panel; Future  -     Basic metabolic panel    Hyperlipidemia, unspecified hyperlipidemia type    Generalized anxiety disorder    Brain injury with loss of consciousness, sequela (H)       Okay to go ahead with planned procedure. Stop aspirin 1 week prior to surgery.  OK to take regular medications with a sip of water the  morning of surgery.     Subjective     HPI related to upcoming procedure: Right RICKY.  He had his left done quite a while ago and did well with that.  His right has bothered him enough now that he wants to move ahead.  He saw Dr. Lama who agrees.  He does have a leg length discrepancy with his right  leg being shorter than his left.  He is hoping that this can be corrected with his surgery.    Preop Questions 5/3/2022   1. Have you ever had a heart attack or stroke? No   2. Have you ever had surgery on your heart or blood vessels, such as a stent placement, a coronary artery bypass, or surgery on an artery in your head, neck, heart, or legs? No   3. Do you have chest pain with activity? No   4. Do you have a history of  heart failure? No   5. Do you currently have a cold, bronchitis or symptoms of other infection? No   6. Do you have a cough, shortness of breath, or wheezing? No   7. Do you or anyone in your family have previous history of blood clots? No   8. Do you or does anyone in your family have a serious bleeding problem such as prolonged bleeding following surgeries or cuts? No   9. Have you ever had problems with anemia or been told to take iron pills? No   10. Have you had any abnormal blood loss such as black, tarry or bloody stools? No   11. Have you ever had a blood transfusion? No   12. Are you willing to have a blood transfusion if it is medically needed before, during, or after your surgery? Yes   13. Have you or any of your relatives ever had problems with anesthesia? No   14. Do you have sleep apnea, excessive snoring or daytime drowsiness? No   15. Do you have any artifical heart valves or other implanted medical devices like a pacemaker, defibrillator, or continuous glucose monitor? No   16. Do you have artificial joints? YES - Left hip   17. Are you allergic to latex? No       Health Care Directive:  Patient does not have a Health Care Directive or Living Will: Patient states has Advance Directive and will bring in a copy to clinic.    Preoperative Review of :   reviewed - no record of controlled substances prescribed.  PDMP Review       Value Time User    State PDMP site checked  Yes 5/3/2022  9:28 AM Chan Cook MD           Status of Chronic Conditions:  HYPERLIPIDEMIA -  Patient has a long history of significant Hyperlipidemia requiring medication for treatment with recent good control. Patient reports no problems or side effects with the medication.     HYPERTENSION - Patient has longstanding history of HTN , currently denies any symptoms referable to elevated blood pressure. Specifically denies chest pain, palpitations, dyspnea, orthopnea, PND or peripheral edema. Blood pressure readings have been in normal range. Current medication regimen is as listed below. Patient denies any side effects of medication.       Review of Systems  Constitutional, neuro, ENT, endocrine, pulmonary, cardiac, gastrointestinal, genitourinary, musculoskeletal, integument and psychiatric systems are negative, except as otherwise noted.    Patient Active Problem List    Diagnosis Date Noted     Generalized anxiety disorder 01/17/2018     Priority: Medium     Elevated prostate specific antigen (PSA) 01/17/2018     Priority: Medium     Overview:   in October 2005.  The patient is taking Levaquin daily for two months.  He   will have another PSA checked in October 2006, recommended by Dr. Wilson.       Hyperlipidemia 01/17/2018     Priority: Medium     Hypertension 01/17/2018     Priority: Medium     Plantar fasciitis 01/17/2018     Priority: Medium     Overview:   right       Benign non-nodular prostatic hyperplasia with lower urinary tract symptoms 07/27/2016     Priority: Medium     Brain injury (H) 01/16/2015     Priority: Medium     Retention of urine 12/12/2014     Priority: Medium     Right hip pain 10/10/2014     Priority: Medium     Anemia 09/22/2014     Priority: Medium     Hydrocephalus (H) 09/22/2014     Priority: Medium     SAH (subarachnoid hemorrhage) (H) 09/22/2014     Priority: Medium     Actinic keratosis 01/28/2014     Priority: Medium     Seborrheic keratosis 01/28/2014     Priority: Medium     H/O adenomatous polyp of colon 04/27/2010     Priority: Medium     Diverticulosis of sigmoid  colon 04/26/2010     Priority: Medium      Past Medical History:   Diagnosis Date     Actinic keratosis     No Comments Provided     Anemia     No Comments Provided     Diverticulosis of large intestine without perforation or abscess without bleeding     No Comments Provided     Elevated prostate specific antigen (PSA)     No Comments Provided     Essential (primary) hypertension     No Comments Provided     Generalized anxiety disorder     No Comments Provided     History of colonic polyps     No Comments Provided     Hydrocephalus (H)     (obstructive)     Hyperlipidemia     No Comments Provided     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 Marin 1/24/05     Nontraumatic subarachnoid hemorrhage (H)     No Comments Provided     Osteoarthritis of hip     No Comments Provided     Other seborrheic keratosis     No Comments Provided     Other specified disorders of bone, unspecified site (CODE)     1/05,Lingual sean, bony growth, lower mandible, evaluation Dr. Marin 1/05     Pain in right hip     No Comments Provided     Personal history of other medical treatment (CODE)     05/16/05,Q wave in 3 and AVF leads on electrocardiogram done     Plantar fascial fibromatosis     No Comments Provided     Retention of urine     No Comments Provided     Screening for other and unspecified cardiovascular conditions     05/20/2005,Stress echocardiogram is negative for ischemia with ejection fraction of 85%     Stiffness of unspecified joint, not elsewhere classified     No Comments Provided     Past Surgical History:   Procedure Laterality Date     COLONOSCOPY  04/26/2010    (04/26/2010),F/U 2015     COLONOSCOPY  10/15/2015    10/15/15,F/U 2025     EXTRACAPSULAR CATARACT EXTRATION WITH INTRAOCULAR LENS IMPLANT      2016     OTHER SURGICAL HISTORY      3/26/14,79528.0,MI TOTAL HIP ARTHROPLASTY,Left,direct  anterior approach, Dr. Lama     OTHER SURGICAL HISTORY      ,[id number],CRANIOTOMY     OTHER SURGICAL HISTORY      2016,,HERNIA REPAIR,Left,LIH with mesh     Current Outpatient Medications   Medication Sig Dispense Refill     aspirin EC 81 MG EC tablet Take 81 mg by mouth daily with food       Cholecalciferol (VITAMIN D3) 1000 UNITS CAPS Take 1 capsule by mouth daily       hydrochlorothiazide (HYDRODIURIL) 12.5 MG tablet TAKE 1 TABLET EVERY DAY 90 tablet 3     lisinopril (ZESTRIL) 40 MG tablet TAKE 1 TABLET EVERY DAY 90 tablet 3     meclizine (ANTIVERT) 12.5 MG tablet TAKE 2 TABLETS(25 MG) BY MOUTH FOUR TIMES DAILY AS NEEDED FOR DIZZINESS 20 tablet 3     Multiple Vitamins-Minerals (MULTILEX-T&M) TABS Take 1 tablet by mouth daily       rosuvastatin (CRESTOR) 20 MG tablet Take 1 tablet (20 mg) by mouth At Bedtime 90 tablet 3     tamsulosin (FLOMAX) 0.4 MG capsule Take 2 capsules (0.8 mg) by mouth daily with food 180 capsule 3     triamcinolone (KENALOG) 0.1 % external cream Apply topically to affected area(s) 3 times daily. 30 g 1       Allergies   Allergen Reactions     Fluress         Social History     Tobacco Use     Smoking status: Former Smoker     Quit date: 1970     Years since quittin.3     Smokeless tobacco: Never Used     Tobacco comment: smoked off and on for 2 years   Substance Use Topics     Alcohol use: Yes     Alcohol/week: 2.0 standard drinks     Types: 2 Cans of beer per week     Comment: 2 times a week/2 drinks     Family History   Problem Relation Age of Onset     Cancer Mother 78        Cancer, lung cancer at age 78, developed brain metastases     Heart Disease Father 50        Heart Disease,heart failure     Other - See Comments Father          MS, long-standing     Other - See Comments Sister          MS     Cancer Paternal Grandfather         Cancer, of cancer of unknown type     History   Drug Use No     Comment: Drug use: No         Objective     /70   Pulse  "69   Temp 97.1  F (36.2  C) (Temporal)   Resp 16   Ht 1.702 m (5' 7\")   Wt 85.7 kg (189 lb)   SpO2 99%   BMI 29.60 kg/m      Physical Exam  `````````````````````````````````````````````````````````````````````````````````````````````````````````````````````````````````````````````````````````````````    Recent Labs   Lab Test 09/21/21  0952 10/20/20  1124   HGB 14.4 14.7    204    142   POTASSIUM 4.2 4.3   CR 1.15 1.11        Diagnostics:  Recent Results (from the past 24 hour(s))   Basic metabolic panel    Collection Time: 05/03/22  9:56 AM   Result Value Ref Range    Sodium 139 134 - 144 mmol/L    Potassium 3.9 3.5 - 5.1 mmol/L    Chloride 103 98 - 107 mmol/L    Carbon Dioxide (CO2) 31 21 - 31 mmol/L    Anion Gap 5 3 - 14 mmol/L    Urea Nitrogen 19 7 - 25 mg/dL    Creatinine 1.19 0.70 - 1.30 mg/dL    Calcium 9.3 8.6 - 10.3 mg/dL    Glucose 110 (H) 70 - 105 mg/dL    GFR Estimate 63 >60 mL/min/1.73m2   CBC with platelets and differential    Collection Time: 05/03/22  9:56 AM   Result Value Ref Range    WBC Count 5.7 4.0 - 11.0 10e3/uL    RBC Count 4.70 4.40 - 5.90 10e6/uL    Hemoglobin 14.6 13.3 - 17.7 g/dL    Hematocrit 42.9 40.0 - 53.0 %    MCV 91 78 - 100 fL    MCH 31.1 26.5 - 33.0 pg    MCHC 34.0 31.5 - 36.5 g/dL    RDW 12.1 10.0 - 15.0 %    Platelet Count 181 150 - 450 10e3/uL    % Neutrophils 71 %    % Lymphocytes 16 %    % Monocytes 10 %    % Eosinophils 1 %    % Basophils 1 %    % Immature Granulocytes 1 %    NRBCs per 100 WBC 0 <1 /100    Absolute Neutrophils 4.1 1.6 - 8.3 10e3/uL    Absolute Lymphocytes 0.9 0.8 - 5.3 10e3/uL    Absolute Monocytes 0.5 0.0 - 1.3 10e3/uL    Absolute Eosinophils 0.1 0.0 - 0.7 10e3/uL    Absolute Basophils 0.0 0.0 - 0.2 10e3/uL    Absolute Immature Granulocytes 0.0 <=0.4 10e3/uL    Absolute NRBCs 0.0 10e3/uL   EKG 12-lead, tracing only (Same Day)    Collection Time: 05/03/22 10:00 AM   Result Value Ref Range    Systolic Blood Pressure  mmHg    Diastolic Blood " Pressure  mmHg    Ventricular Rate 62 BPM    Atrial Rate 62 BPM    DC Interval 170 ms    QRS Duration 92 ms     ms    QTc 397 ms    P Axis 47 degrees    R AXIS -19 degrees    T Axis 38 degrees    Interpretation ECG       Sinus rhythm with Premature atrial complexes  Inferior infarct , age undetermined  Abnormal ECG  When compared with ECG of 20-OCT-2020 11:52,  Incomplete right bundle branch block is no longer Present        EKG: Normal Sinus Rhythm, normal axis, normal intervals, no acute ST/T changes c/w ischemia, no LVH by voltage criteria, unchanged from previous tracings    Revised Cardiac Risk Index (RCRI):  The patient has the following serious cardiovascular risks for perioperative complications:   - Cerebrovascular Disease (TIA or CVA) = 1 point     RCRI Interpretation: 1 point: Class II (low risk - 0.9% complication rate)           Signed Electronically by: Chan Cook MD  Copy of this evaluation report is provided to requesting physician.      Answers for HPI/ROS submitted by the patient on 5/3/2022  EDDIE 7 TOTAL SCORE: 0

## 2022-05-03 NOTE — NURSING NOTE
"Chief Complaint   Patient presents with     Pre-Op Exam     Surgery date 05/17/2022       Initial /70   Pulse 69   Temp 97.1  F (36.2  C) (Temporal)   Resp 16   Ht 1.702 m (5' 7\")   Wt 85.7 kg (189 lb)   SpO2 99%   BMI 29.60 kg/m   Estimated body mass index is 29.6 kg/m  as calculated from the following:    Height as of this encounter: 1.702 m (5' 7\").    Weight as of this encounter: 85.7 kg (189 lb).  Medication Reconciliation: complete    FOOD SECURITY SCREENING QUESTIONS  Hunger Vital Signs:  Within the past 12 months we worried whether our food would run out before we got money to buy more. Never  Within the past 12 months the food we bought just didn't last and we didn't have money to get more. Never        Advance care directive on file? no  Advance care directive provided to patient? Has one and will bring it in     Mandy Mcfarlane LPN  "

## 2022-05-04 ASSESSMENT — ANXIETY QUESTIONNAIRES: GAD7 TOTAL SCORE: 0

## 2022-05-13 ENCOUNTER — MYC MEDICAL ADVICE (OUTPATIENT)
Dept: FAMILY MEDICINE | Facility: OTHER | Age: 77
End: 2022-05-13
Payer: COMMERCIAL

## 2022-05-14 ENCOUNTER — ALLIED HEALTH/NURSE VISIT (OUTPATIENT)
Dept: FAMILY MEDICINE | Facility: OTHER | Age: 77
End: 2022-05-14
Attending: FAMILY MEDICINE
Payer: MEDICARE

## 2022-05-14 DIAGNOSIS — Z20.822 COVID-19 RULED OUT: Primary | ICD-10-CM

## 2022-05-14 PROCEDURE — U0003 INFECTIOUS AGENT DETECTION BY NUCLEIC ACID (DNA OR RNA); SEVERE ACUTE RESPIRATORY SYNDROME CORONAVIRUS 2 (SARS-COV-2) (CORONAVIRUS DISEASE [COVID-19]), AMPLIFIED PROBE TECHNIQUE, MAKING USE OF HIGH THROUGHPUT TECHNOLOGIES AS DESCRIBED BY CMS-2020-01-R: HCPCS | Mod: ZL

## 2022-05-14 PROCEDURE — C9803 HOPD COVID-19 SPEC COLLECT: HCPCS

## 2022-05-14 NOTE — PROGRESS NOTES
Patient here today for Covid test. Surgery 5/17/22.     Tonja Ramires CNA .............................on 5/14/2022 at 9:38 AM

## 2022-05-15 LAB — SARS-COV-2 RNA RESP QL NAA+PROBE: NEGATIVE

## 2022-05-16 ENCOUNTER — ANESTHESIA EVENT (OUTPATIENT)
Dept: SURGERY | Facility: OTHER | Age: 77
End: 2022-05-16
Payer: MEDICARE

## 2022-05-17 ENCOUNTER — APPOINTMENT (OUTPATIENT)
Dept: PHYSICAL THERAPY | Facility: OTHER | Age: 77
End: 2022-05-17
Attending: ORTHOPAEDIC SURGERY
Payer: MEDICARE

## 2022-05-17 ENCOUNTER — APPOINTMENT (OUTPATIENT)
Dept: GENERAL RADIOLOGY | Facility: OTHER | Age: 77
End: 2022-05-17
Attending: ORTHOPAEDIC SURGERY
Payer: MEDICARE

## 2022-05-17 ENCOUNTER — ANESTHESIA (OUTPATIENT)
Dept: SURGERY | Facility: OTHER | Age: 77
End: 2022-05-17
Payer: MEDICARE

## 2022-05-17 ENCOUNTER — HOSPITAL ENCOUNTER (OUTPATIENT)
Dept: GENERAL RADIOLOGY | Facility: OTHER | Age: 77
Discharge: HOME | End: 2022-05-17
Attending: ORTHOPAEDIC SURGERY | Admitting: ORTHOPAEDIC SURGERY
Payer: MEDICARE

## 2022-05-17 ENCOUNTER — HOSPITAL ENCOUNTER (OUTPATIENT)
Facility: OTHER | Age: 77
Discharge: HOME | End: 2022-05-18
Attending: ORTHOPAEDIC SURGERY | Admitting: ORTHOPAEDIC SURGERY
Payer: MEDICARE

## 2022-05-17 DIAGNOSIS — Z96.649 H/O TOTAL HIP ARTHROPLASTY: ICD-10-CM

## 2022-05-17 DIAGNOSIS — M25.551 RIGHT HIP PAIN: Primary | ICD-10-CM

## 2022-05-17 LAB — GLUCOSE BLDC GLUCOMTR-MCNC: 101 MG/DL (ref 70–99)

## 2022-05-17 PROCEDURE — 999N000104 HC STATISTIC NO CHARGE

## 2022-05-17 PROCEDURE — 258N000003 HC RX IP 258 OP 636: Performed by: NURSE ANESTHETIST, CERTIFIED REGISTERED

## 2022-05-17 PROCEDURE — 99213 OFFICE O/P EST LOW 20 MIN: CPT | Performed by: FAMILY MEDICINE

## 2022-05-17 PROCEDURE — 250N000013 HC RX MED GY IP 250 OP 250 PS 637: Performed by: ORTHOPAEDIC SURGERY

## 2022-05-17 PROCEDURE — 258N000001 HC RX 258: Performed by: ORTHOPAEDIC SURGERY

## 2022-05-17 PROCEDURE — 250N000011 HC RX IP 250 OP 636: Performed by: NURSE ANESTHETIST, CERTIFIED REGISTERED

## 2022-05-17 PROCEDURE — 27130 TOTAL HIP ARTHROPLASTY: CPT | Performed by: NURSE ANESTHETIST, CERTIFIED REGISTERED

## 2022-05-17 PROCEDURE — 250N000011 HC RX IP 250 OP 636: Performed by: ORTHOPAEDIC SURGERY

## 2022-05-17 PROCEDURE — 250N000009 HC RX 250: Performed by: NURSE ANESTHETIST, CERTIFIED REGISTERED

## 2022-05-17 PROCEDURE — C1776 JOINT DEVICE (IMPLANTABLE): HCPCS | Performed by: ORTHOPAEDIC SURGERY

## 2022-05-17 PROCEDURE — 258N000003 HC RX IP 258 OP 636: Performed by: ORTHOPAEDIC SURGERY

## 2022-05-17 PROCEDURE — 370N000017 HC ANESTHESIA TECHNICAL FEE, PER MIN: Performed by: ORTHOPAEDIC SURGERY

## 2022-05-17 PROCEDURE — 99100 ANES PT EXTEME AGE<1 YR&>70: CPT | Performed by: NURSE ANESTHETIST, CERTIFIED REGISTERED

## 2022-05-17 PROCEDURE — 64447 NJX AA&/STRD FEMORAL NRV IMG: CPT | Mod: RT | Performed by: NURSE ANESTHETIST, CERTIFIED REGISTERED

## 2022-05-17 PROCEDURE — 999N000063 XR PELVIS PORT 1/2 VIEWS

## 2022-05-17 PROCEDURE — 999N000180 XR SURGERY CARM FLUORO LESS THAN 5 MIN: Mod: TC

## 2022-05-17 PROCEDURE — 27130 TOTAL HIP ARTHROPLASTY: CPT | Mod: RT | Performed by: ORTHOPAEDIC SURGERY

## 2022-05-17 PROCEDURE — 710N000010 HC RECOVERY PHASE 1, LEVEL 2, PER MIN: Performed by: ORTHOPAEDIC SURGERY

## 2022-05-17 PROCEDURE — 76942 ECHO GUIDE FOR BIOPSY: CPT | Mod: 26 | Performed by: NURSE ANESTHETIST, CERTIFIED REGISTERED

## 2022-05-17 PROCEDURE — 272N000001 HC OR GENERAL SUPPLY STERILE: Performed by: ORTHOPAEDIC SURGERY

## 2022-05-17 PROCEDURE — 96374 THER/PROPH/DIAG INJ IV PUSH: CPT | Mod: XU

## 2022-05-17 PROCEDURE — 96375 TX/PRO/DX INJ NEW DRUG ADDON: CPT | Mod: XU

## 2022-05-17 PROCEDURE — 999N000141 HC STATISTIC PRE-PROCEDURE NURSING ASSESSMENT: Performed by: ORTHOPAEDIC SURGERY

## 2022-05-17 PROCEDURE — 82962 GLUCOSE BLOOD TEST: CPT

## 2022-05-17 PROCEDURE — 360N000084 HC SURGERY LEVEL 4 W/ FLUORO, PER MIN: Performed by: ORTHOPAEDIC SURGERY

## 2022-05-17 DEVICE — IMPLANTABLE DEVICE
Type: IMPLANTABLE DEVICE | Site: HIP | Status: FUNCTIONAL
Brand: BIOLOX® OPTION

## 2022-05-17 DEVICE — IMPLANTABLE DEVICE
Type: IMPLANTABLE DEVICE | Site: HIP | Status: FUNCTIONAL
Brand: CONTINUUM® TRABECULAR METAL™

## 2022-05-17 DEVICE — IMPLANTABLE DEVICE
Type: IMPLANTABLE DEVICE | Site: HIP | Status: FUNCTIONAL
Brand: CONTINUUM® TRILOGY® ALLOFIT® VIVACIT-E®

## 2022-05-17 DEVICE — IMPLANTABLE DEVICE
Type: IMPLANTABLE DEVICE | Site: HIP | Status: FUNCTIONAL
Brand: TAPERLOC COMPLETE PRIMARY FEMORAL

## 2022-05-17 RX ORDER — HYDROXYZINE HYDROCHLORIDE 10 MG/1
10 TABLET, FILM COATED ORAL EVERY 6 HOURS PRN
Qty: 30 TABLET | Refills: 0 | Status: SHIPPED | OUTPATIENT
Start: 2022-05-17 | End: 2024-01-26

## 2022-05-17 RX ORDER — PROPOFOL 10 MG/ML
INJECTION, EMULSION INTRAVENOUS PRN
Status: DISCONTINUED | OUTPATIENT
Start: 2022-05-17 | End: 2022-05-17

## 2022-05-17 RX ORDER — GLYCINE 1.5 G/100ML
SOLUTION IRRIGATION PRN
Status: DISCONTINUED | OUTPATIENT
Start: 2022-05-17 | End: 2022-05-17 | Stop reason: HOSPADM

## 2022-05-17 RX ORDER — BUPIVACAINE HYDROCHLORIDE 2.5 MG/ML
INJECTION, SOLUTION EPIDURAL; INFILTRATION; INTRACAUDAL PRN
Status: DISCONTINUED | OUTPATIENT
Start: 2022-05-17 | End: 2022-05-17

## 2022-05-17 RX ORDER — AMOXICILLIN 250 MG
1-2 CAPSULE ORAL 2 TIMES DAILY
Qty: 30 TABLET | Refills: 0 | Status: SHIPPED | OUTPATIENT
Start: 2022-05-17 | End: 2022-10-25

## 2022-05-17 RX ORDER — ACETAMINOPHEN 325 MG/1
975 TABLET ORAL EVERY 8 HOURS
Status: DISCONTINUED | OUTPATIENT
Start: 2022-05-17 | End: 2022-05-18 | Stop reason: HOSPADM

## 2022-05-17 RX ORDER — MEPERIDINE HYDROCHLORIDE 50 MG/ML
12.5 INJECTION INTRAMUSCULAR; INTRAVENOUS; SUBCUTANEOUS
Status: DISCONTINUED | OUTPATIENT
Start: 2022-05-17 | End: 2022-05-17 | Stop reason: HOSPADM

## 2022-05-17 RX ORDER — FENTANYL CITRATE-0.9 % NACL/PF 10 MCG/ML
100 PLASTIC BAG, INJECTION (ML) INTRAVENOUS
Status: DISCONTINUED | OUTPATIENT
Start: 2022-05-17 | End: 2022-05-17 | Stop reason: CLARIF

## 2022-05-17 RX ORDER — FENTANYL CITRATE 50 UG/ML
25 INJECTION, SOLUTION INTRAMUSCULAR; INTRAVENOUS
Status: DISCONTINUED | OUTPATIENT
Start: 2022-05-17 | End: 2022-05-17 | Stop reason: HOSPADM

## 2022-05-17 RX ORDER — CEFAZOLIN SODIUM 2 G/100ML
2 INJECTION, SOLUTION INTRAVENOUS EVERY 8 HOURS
Status: COMPLETED | OUTPATIENT
Start: 2022-05-17 | End: 2022-05-18

## 2022-05-17 RX ORDER — ACETAMINOPHEN 325 MG/1
975 TABLET ORAL ONCE
Status: COMPLETED | OUTPATIENT
Start: 2022-05-17 | End: 2022-05-17

## 2022-05-17 RX ORDER — ATORVASTATIN CALCIUM 40 MG/1
80 TABLET, FILM COATED ORAL DAILY
Status: DISCONTINUED | OUTPATIENT
Start: 2022-05-18 | End: 2022-05-18 | Stop reason: HOSPADM

## 2022-05-17 RX ORDER — AMOXICILLIN 250 MG
1 CAPSULE ORAL 2 TIMES DAILY
Status: DISCONTINUED | OUTPATIENT
Start: 2022-05-17 | End: 2022-05-18 | Stop reason: HOSPADM

## 2022-05-17 RX ORDER — LIDOCAINE 40 MG/G
CREAM TOPICAL
Status: DISCONTINUED | OUTPATIENT
Start: 2022-05-17 | End: 2022-05-17 | Stop reason: HOSPADM

## 2022-05-17 RX ORDER — ONDANSETRON 2 MG/ML
4 INJECTION INTRAMUSCULAR; INTRAVENOUS EVERY 6 HOURS PRN
Status: DISCONTINUED | OUTPATIENT
Start: 2022-05-17 | End: 2022-05-18 | Stop reason: HOSPADM

## 2022-05-17 RX ORDER — ASPIRIN 81 MG/1
81 TABLET ORAL 2 TIMES DAILY
Status: DISCONTINUED | OUTPATIENT
Start: 2022-05-18 | End: 2022-05-18 | Stop reason: HOSPADM

## 2022-05-17 RX ORDER — HYDROXYZINE HYDROCHLORIDE 10 MG/1
10 TABLET, FILM COATED ORAL EVERY 6 HOURS PRN
Status: DISCONTINUED | OUTPATIENT
Start: 2022-05-17 | End: 2022-05-18 | Stop reason: HOSPADM

## 2022-05-17 RX ORDER — ONDANSETRON 4 MG/1
4 TABLET, ORALLY DISINTEGRATING ORAL EVERY 6 HOURS PRN
Status: DISCONTINUED | OUTPATIENT
Start: 2022-05-17 | End: 2022-05-18 | Stop reason: HOSPADM

## 2022-05-17 RX ORDER — CEFAZOLIN SODIUM/WATER 2 G/20 ML
2 SYRINGE (ML) INTRAVENOUS SEE ADMIN INSTRUCTIONS
Status: DISCONTINUED | OUTPATIENT
Start: 2022-05-17 | End: 2022-05-17 | Stop reason: HOSPADM

## 2022-05-17 RX ORDER — MECLIZINE HCL 12.5 MG 12.5 MG/1
12.5 TABLET ORAL 3 TIMES DAILY PRN
Status: DISCONTINUED | OUTPATIENT
Start: 2022-05-17 | End: 2022-05-18 | Stop reason: HOSPADM

## 2022-05-17 RX ORDER — OXYCODONE HYDROCHLORIDE 5 MG/1
5 TABLET ORAL EVERY 4 HOURS PRN
Status: DISCONTINUED | OUTPATIENT
Start: 2022-05-17 | End: 2022-05-18 | Stop reason: HOSPADM

## 2022-05-17 RX ORDER — LIDOCAINE HYDROCHLORIDE 10 MG/ML
INJECTION, SOLUTION INFILTRATION; PERINEURAL PRN
Status: DISCONTINUED | OUTPATIENT
Start: 2022-05-17 | End: 2022-05-17

## 2022-05-17 RX ORDER — EPHEDRINE SULFATE 50 MG/ML
INJECTION, SOLUTION INTRAMUSCULAR; INTRAVENOUS; SUBCUTANEOUS PRN
Status: DISCONTINUED | OUTPATIENT
Start: 2022-05-17 | End: 2022-05-17

## 2022-05-17 RX ORDER — SODIUM CHLORIDE, SODIUM LACTATE, POTASSIUM CHLORIDE, CALCIUM CHLORIDE 600; 310; 30; 20 MG/100ML; MG/100ML; MG/100ML; MG/100ML
INJECTION, SOLUTION INTRAVENOUS CONTINUOUS
Status: DISCONTINUED | OUTPATIENT
Start: 2022-05-17 | End: 2022-05-18 | Stop reason: HOSPADM

## 2022-05-17 RX ORDER — ROSUVASTATIN CALCIUM 20 MG/1
20 TABLET, COATED ORAL DAILY
COMMUNITY
End: 2022-10-06

## 2022-05-17 RX ORDER — FENTANYL CITRATE 50 UG/ML
50 INJECTION, SOLUTION INTRAMUSCULAR; INTRAVENOUS EVERY 5 MIN PRN
Status: DISCONTINUED | OUTPATIENT
Start: 2022-05-17 | End: 2022-05-17 | Stop reason: HOSPADM

## 2022-05-17 RX ORDER — ONDANSETRON 2 MG/ML
INJECTION INTRAMUSCULAR; INTRAVENOUS PRN
Status: DISCONTINUED | OUTPATIENT
Start: 2022-05-17 | End: 2022-05-17

## 2022-05-17 RX ORDER — OXYCODONE HYDROCHLORIDE 5 MG/1
5 TABLET ORAL EVERY 4 HOURS PRN
Status: DISCONTINUED | OUTPATIENT
Start: 2022-05-17 | End: 2022-05-17 | Stop reason: HOSPADM

## 2022-05-17 RX ORDER — PROCHLORPERAZINE MALEATE 5 MG
5 TABLET ORAL EVERY 6 HOURS PRN
Status: DISCONTINUED | OUTPATIENT
Start: 2022-05-17 | End: 2022-05-18 | Stop reason: HOSPADM

## 2022-05-17 RX ORDER — TRIAMCINOLONE ACETONIDE 1 MG/G
CREAM TOPICAL 3 TIMES DAILY
Status: DISCONTINUED | OUTPATIENT
Start: 2022-05-17 | End: 2022-05-17

## 2022-05-17 RX ORDER — DEXAMETHASONE SODIUM PHOSPHATE 10 MG/ML
INJECTION, SOLUTION INTRAMUSCULAR; INTRAVENOUS PRN
Status: DISCONTINUED | OUTPATIENT
Start: 2022-05-17 | End: 2022-05-17

## 2022-05-17 RX ORDER — OXYCODONE HYDROCHLORIDE 5 MG/1
5-10 TABLET ORAL
Qty: 30 TABLET | Refills: 0 | Status: SHIPPED | OUTPATIENT
Start: 2022-05-17 | End: 2022-05-23

## 2022-05-17 RX ORDER — NALOXONE HYDROCHLORIDE 0.4 MG/ML
0.2 INJECTION, SOLUTION INTRAMUSCULAR; INTRAVENOUS; SUBCUTANEOUS
Status: DISCONTINUED | OUTPATIENT
Start: 2022-05-17 | End: 2022-05-18 | Stop reason: HOSPADM

## 2022-05-17 RX ORDER — HYDROMORPHONE HYDROCHLORIDE 1 MG/ML
0.4 INJECTION, SOLUTION INTRAMUSCULAR; INTRAVENOUS; SUBCUTANEOUS EVERY 5 MIN PRN
Status: DISCONTINUED | OUTPATIENT
Start: 2022-05-17 | End: 2022-05-17 | Stop reason: HOSPADM

## 2022-05-17 RX ORDER — ONDANSETRON 2 MG/ML
4 INJECTION INTRAMUSCULAR; INTRAVENOUS EVERY 30 MIN PRN
Status: DISCONTINUED | OUTPATIENT
Start: 2022-05-17 | End: 2022-05-17 | Stop reason: HOSPADM

## 2022-05-17 RX ORDER — ASPIRIN 81 MG/1
81 TABLET ORAL 2 TIMES DAILY
Qty: 60 TABLET | Refills: 0 | Status: SHIPPED | OUTPATIENT
Start: 2022-05-17

## 2022-05-17 RX ORDER — TAMSULOSIN HYDROCHLORIDE 0.4 MG/1
0.8 CAPSULE ORAL DAILY
Status: DISCONTINUED | OUTPATIENT
Start: 2022-05-18 | End: 2022-05-18 | Stop reason: HOSPADM

## 2022-05-17 RX ORDER — CELECOXIB 100 MG/1
400 CAPSULE ORAL ONCE
Status: COMPLETED | OUTPATIENT
Start: 2022-05-17 | End: 2022-05-17

## 2022-05-17 RX ORDER — HYDROMORPHONE HCL IN WATER/PF 6 MG/30 ML
0.2 PATIENT CONTROLLED ANALGESIA SYRINGE INTRAVENOUS
Status: DISCONTINUED | OUTPATIENT
Start: 2022-05-17 | End: 2022-05-18 | Stop reason: HOSPADM

## 2022-05-17 RX ORDER — KETOROLAC TROMETHAMINE 15 MG/ML
15 INJECTION, SOLUTION INTRAMUSCULAR; INTRAVENOUS EVERY 6 HOURS
Status: DISCONTINUED | OUTPATIENT
Start: 2022-05-17 | End: 2022-05-18 | Stop reason: HOSPADM

## 2022-05-17 RX ORDER — LIDOCAINE 40 MG/G
CREAM TOPICAL
Status: DISCONTINUED | OUTPATIENT
Start: 2022-05-17 | End: 2022-05-18 | Stop reason: HOSPADM

## 2022-05-17 RX ORDER — CEFAZOLIN SODIUM/WATER 2 G/20 ML
2 SYRINGE (ML) INTRAVENOUS
Status: COMPLETED | OUTPATIENT
Start: 2022-05-17 | End: 2022-05-17

## 2022-05-17 RX ORDER — ASPIRIN 81 MG/1
81 TABLET ORAL DAILY
Status: DISCONTINUED | OUTPATIENT
Start: 2022-05-17 | End: 2022-05-17

## 2022-05-17 RX ORDER — BUPIVACAINE HYDROCHLORIDE 7.5 MG/ML
INJECTION, SOLUTION INTRASPINAL PRN
Status: DISCONTINUED | OUTPATIENT
Start: 2022-05-17 | End: 2022-05-17

## 2022-05-17 RX ORDER — SODIUM CHLORIDE, SODIUM LACTATE, POTASSIUM CHLORIDE, CALCIUM CHLORIDE 600; 310; 30; 20 MG/100ML; MG/100ML; MG/100ML; MG/100ML
INJECTION, SOLUTION INTRAVENOUS CONTINUOUS
Status: DISCONTINUED | OUTPATIENT
Start: 2022-05-17 | End: 2022-05-17 | Stop reason: HOSPADM

## 2022-05-17 RX ORDER — HYDROMORPHONE HCL IN WATER/PF 6 MG/30 ML
0.4 PATIENT CONTROLLED ANALGESIA SYRINGE INTRAVENOUS
Status: DISCONTINUED | OUTPATIENT
Start: 2022-05-17 | End: 2022-05-18 | Stop reason: HOSPADM

## 2022-05-17 RX ORDER — ONDANSETRON 4 MG/1
4 TABLET, ORALLY DISINTEGRATING ORAL EVERY 30 MIN PRN
Status: DISCONTINUED | OUTPATIENT
Start: 2022-05-17 | End: 2022-05-17 | Stop reason: HOSPADM

## 2022-05-17 RX ORDER — FENTANYL CITRATE-0.9 % NACL/PF 10 MCG/ML
PLASTIC BAG, INJECTION (ML) INTRAVENOUS PRN
Status: DISCONTINUED | OUTPATIENT
Start: 2022-05-17 | End: 2022-05-17

## 2022-05-17 RX ORDER — OXYCODONE HYDROCHLORIDE 5 MG/1
10 TABLET ORAL EVERY 4 HOURS PRN
Status: DISCONTINUED | OUTPATIENT
Start: 2022-05-17 | End: 2022-05-18 | Stop reason: HOSPADM

## 2022-05-17 RX ORDER — ROSUVASTATIN CALCIUM 20 MG/1
20 TABLET, COATED ORAL AT BEDTIME
Status: DISCONTINUED | OUTPATIENT
Start: 2022-05-17 | End: 2022-05-17 | Stop reason: CLARIF

## 2022-05-17 RX ORDER — POLYETHYLENE GLYCOL 3350 17 G/17G
17 POWDER, FOR SOLUTION ORAL DAILY
Status: DISCONTINUED | OUTPATIENT
Start: 2022-05-18 | End: 2022-05-18 | Stop reason: HOSPADM

## 2022-05-17 RX ORDER — LISINOPRIL 40 MG/1
40 TABLET ORAL DAILY
Status: DISCONTINUED | OUTPATIENT
Start: 2022-05-18 | End: 2022-05-18 | Stop reason: HOSPADM

## 2022-05-17 RX ORDER — HYDROCHLOROTHIAZIDE 12.5 MG/1
12.5 CAPSULE ORAL DAILY
Status: DISCONTINUED | OUTPATIENT
Start: 2022-05-18 | End: 2022-05-18 | Stop reason: HOSPADM

## 2022-05-17 RX ORDER — LIDOCAINE HYDROCHLORIDE 20 MG/ML
INJECTION, SOLUTION INFILTRATION; PERINEURAL PRN
Status: DISCONTINUED | OUTPATIENT
Start: 2022-05-17 | End: 2022-05-17

## 2022-05-17 RX ORDER — ACETAMINOPHEN 325 MG/1
650 TABLET ORAL EVERY 4 HOURS PRN
Qty: 100 TABLET | Refills: 0 | Status: SHIPPED | OUTPATIENT
Start: 2022-05-17 | End: 2023-06-14

## 2022-05-17 RX ORDER — BISACODYL 10 MG
10 SUPPOSITORY, RECTAL RECTAL DAILY PRN
Status: DISCONTINUED | OUTPATIENT
Start: 2022-05-17 | End: 2022-05-18 | Stop reason: HOSPADM

## 2022-05-17 RX ORDER — NALOXONE HYDROCHLORIDE 0.4 MG/ML
0.4 INJECTION, SOLUTION INTRAMUSCULAR; INTRAVENOUS; SUBCUTANEOUS
Status: DISCONTINUED | OUTPATIENT
Start: 2022-05-17 | End: 2022-05-18 | Stop reason: HOSPADM

## 2022-05-17 RX ORDER — ACETAMINOPHEN 325 MG/1
650 TABLET ORAL EVERY 4 HOURS PRN
Status: DISCONTINUED | OUTPATIENT
Start: 2022-05-20 | End: 2022-05-18 | Stop reason: HOSPADM

## 2022-05-17 RX ORDER — PROPOFOL 10 MG/ML
INJECTION, EMULSION INTRAVENOUS CONTINUOUS PRN
Status: DISCONTINUED | OUTPATIENT
Start: 2022-05-17 | End: 2022-05-17

## 2022-05-17 RX ORDER — TRANEXAMIC ACID 650 MG/1
1950 TABLET ORAL ONCE
Status: COMPLETED | OUTPATIENT
Start: 2022-05-17 | End: 2022-05-17

## 2022-05-17 RX ADMIN — Medication 100 MCG: at 12:46

## 2022-05-17 RX ADMIN — CELECOXIB 400 MG: 100 CAPSULE ORAL at 08:52

## 2022-05-17 RX ADMIN — Medication 2 G: at 11:25

## 2022-05-17 RX ADMIN — Medication 100 MCG: at 12:55

## 2022-05-17 RX ADMIN — PROPOFOL 80 MCG/KG/MIN: 10 INJECTION, EMULSION INTRAVENOUS at 11:43

## 2022-05-17 RX ADMIN — BUPIVACAINE HYDROCHLORIDE 30 ML: 2.5 INJECTION, SOLUTION EPIDURAL; INFILTRATION; INTRACAUDAL; PERINEURAL at 11:09

## 2022-05-17 RX ADMIN — Medication 100 MCG: at 11:53

## 2022-05-17 RX ADMIN — SODIUM CHLORIDE, SODIUM LACTATE, POTASSIUM CHLORIDE, AND CALCIUM CHLORIDE 100 ML/HR: 600; 310; 30; 20 INJECTION, SOLUTION INTRAVENOUS at 09:30

## 2022-05-17 RX ADMIN — MIDAZOLAM 2 MG: 1 INJECTION INTRAMUSCULAR; INTRAVENOUS at 10:57

## 2022-05-17 RX ADMIN — Medication 5 MG: at 12:14

## 2022-05-17 RX ADMIN — ACETAMINOPHEN 975 MG: 325 TABLET ORAL at 08:51

## 2022-05-17 RX ADMIN — Medication 5 MG: at 12:36

## 2022-05-17 RX ADMIN — SODIUM CHLORIDE, SODIUM LACTATE, POTASSIUM CHLORIDE, AND CALCIUM CHLORIDE: 600; 310; 30; 20 INJECTION, SOLUTION INTRAVENOUS at 13:00

## 2022-05-17 RX ADMIN — Medication 5 MG: at 12:07

## 2022-05-17 RX ADMIN — LIDOCAINE HYDROCHLORIDE 50 MG: 20 INJECTION, SOLUTION INFILTRATION; PERINEURAL at 11:43

## 2022-05-17 RX ADMIN — TRANEXAMIC ACID 1950 MG: 650 TABLET ORAL at 08:52

## 2022-05-17 RX ADMIN — BUPIVACAINE HYDROCHLORIDE 1.7 ML: 7.5 INJECTION, SOLUTION INTRASPINAL at 11:34

## 2022-05-17 RX ADMIN — Medication 5 MG: at 12:46

## 2022-05-17 RX ADMIN — OXYCODONE HYDROCHLORIDE 10 MG: 5 TABLET ORAL at 21:10

## 2022-05-17 RX ADMIN — Medication 5 MG: at 12:55

## 2022-05-17 RX ADMIN — KETOROLAC TROMETHAMINE 15 MG: 15 INJECTION, SOLUTION INTRAMUSCULAR; INTRAVENOUS at 21:10

## 2022-05-17 RX ADMIN — LIDOCAINE HYDROCHLORIDE 1.8 ML: 10 INJECTION, SOLUTION INFILTRATION; PERINEURAL at 11:32

## 2022-05-17 RX ADMIN — ACETAMINOPHEN 975 MG: 325 TABLET ORAL at 16:12

## 2022-05-17 RX ADMIN — DEXAMETHASONE SODIUM PHOSPHATE 5 MG: 10 INJECTION, SOLUTION INTRAMUSCULAR; INTRAVENOUS at 11:09

## 2022-05-17 RX ADMIN — Medication 150 MCG: at 12:20

## 2022-05-17 RX ADMIN — Medication 100 MCG: at 12:40

## 2022-05-17 RX ADMIN — CEFAZOLIN SODIUM 2 G: 2 INJECTION, SOLUTION INTRAVENOUS at 20:02

## 2022-05-17 RX ADMIN — Medication 100 MCG: at 12:11

## 2022-05-17 RX ADMIN — PROPOFOL 50 MG: 10 INJECTION, EMULSION INTRAVENOUS at 11:43

## 2022-05-17 RX ADMIN — SENNOSIDES AND DOCUSATE SODIUM 1 TABLET: 50; 8.6 TABLET ORAL at 21:10

## 2022-05-17 RX ADMIN — SODIUM CHLORIDE, POTASSIUM CHLORIDE, SODIUM LACTATE AND CALCIUM CHLORIDE: 600; 310; 30; 20 INJECTION, SOLUTION INTRAVENOUS at 14:43

## 2022-05-17 RX ADMIN — OXYCODONE HYDROCHLORIDE 5 MG: 5 TABLET ORAL at 17:02

## 2022-05-17 RX ADMIN — ONDANSETRON HYDROCHLORIDE 4 MG: 2 SOLUTION INTRAMUSCULAR; INTRAVENOUS at 13:00

## 2022-05-17 RX ADMIN — Medication 150 MCG: at 12:30

## 2022-05-17 RX ADMIN — MIDAZOLAM HYDROCHLORIDE 2 MG: 1 INJECTION, SOLUTION INTRAMUSCULAR; INTRAVENOUS at 11:27

## 2022-05-17 RX ADMIN — Medication 100 MCG: at 12:07

## 2022-05-17 NOTE — PROGRESS NOTES
PACU Transfer Note    Chao Kearney was transferred to [facility] [place] via bed.  Equipment used for transport:  none.  Accompanied by:  DAISHA Trent and DAISHA Geller  Prescriptions were: none    PACU Respiratory Event Documentation     1) Episodes of Apnea greater than or equal to 10 seconds: none    2) Bradypnea - less than 8 breaths per minute: none    3) Pain score on 0 to 10 scale: denies    4) Pain-sedation mismatch (yes or no): no    5) Repeated 02 desaturation less than 90% (yes or no): no    Anesthesia notified? (yes or no): no    Any of the above events occuring repeatedly in separate 30 minute intervals may be considered recurrent PACU respiratory events.    Patient stable and meets phase 1 discharge criteria for transport from PACU.

## 2022-05-17 NOTE — ANESTHESIA CARE TRANSFER NOTE
Patient: Chao Kearney    Procedure: Procedure(s):  ARTHROPLASTY, HIP, TOTAL, DIRECT ANTERIOR APPROACH       Diagnosis: Right hip pain [M25.551]  Diagnosis Additional Information: No value filed.    Anesthesia Type:   Spinal     Note:    Oropharynx: oral airway in place and spontaneously breathing  Level of Consciousness: drowsy  Oxygen Supplementation: face mask  Level of Supplemental Oxygen (L/min / FiO2): 6  Independent Airway: airway patency satisfactory and stable  Dentition: dentition unchanged  Vital Signs Stable: post-procedure vital signs reviewed and stable  Report to RN Given: handoff report given  Patient transferred to: PACU    Handoff Report: Identifed the Patient, Identified the Reponsible Provider, Reviewed the pertinent medical history, Discussed the surgical course, Reviewed Intra-OP anesthesia mangement and issues during anesthesia, Set expectations for post-procedure period and Allowed opportunity for questions and acknowledgement of understanding      Vitals:  Vitals Value Taken Time   BP     Temp     Pulse     Resp     SpO2 98 % 05/17/22 1309   Vitals shown include unvalidated device data.    Electronically Signed By: YUMIKO TYLER CRNA  May 17, 2022  1:10 PM

## 2022-05-17 NOTE — PLAN OF CARE
Admission Note    Data:  Chao Kearney admitted to 331 from surgery at 1400.      Action:  Dr. Lama has been notified of admission. Pt oriented to unit, call light in reach.     Response:  Patient tolerated transfer well.

## 2022-05-17 NOTE — BRIEF OP NOTE
[facility]    Brief Operative Note    Pre-operative diagnosis: Right hip pain [M25.551]  Post-operative diagnosis Same as pre-operative diagnosis    Procedure: Procedure(s):  ARTHROPLASTY, HIP, TOTAL, DIRECT ANTERIOR APPROACH  Surgeon: Surgeon(s) and Role:     * Luis Miguel Lama MD - Primary     * Fermin Phan PA-C - Assisting  Anesthesia: Spinal   Estimated Blood Loss: Less than 100 ml    Drains: None  Specimens: * No specimens in log *  Findings:   None.  Complications: None.  Implants:   Implant Name Type Inv. Item Serial No.  Lot No. LRB No. Used Action   IMP SHELL ZIM CONTINUUM CLSTR-HL 52MM [facility] -481-01 - [id number] Total Joint Component/Insert IMP SHELL ZIM CONTINUUM CLSTR-HL 52MM [facility] -886-01  [employer] [id number] Right 1 Implanted   IMP LINER ZIM CNTINM VIVACIT-E RANDA II 36X52 -098-36 - [id number] Total Joint Component/Insert IMP LINER ZIM CNTINM VIVACIT-E RANDA II 36X52 -747-36  [employer] [id number] Right 1 Implanted   IMP STEM FEM BIOM TAPERLOC STD OFFSET TYPE 1 [facility] -110 - [id number] Total Joint Component/Insert IMP STEM FEM BIOM TAPERLOC STD OFFSET TYPE 1 [facility] -110  [employer] [id number] Right 1 Implanted   Ceramic head 36 mm     [employer] [id number] Right 1 Implanted   Taper sleeve standard neck    [employer] [id number] Right 1 Implanted

## 2022-05-17 NOTE — PROGRESS NOTES
"[facility]    Medicine Progress Note - Hospitalist Service    Date of Admission:  5/17/2022    Assessment & Plan          Primary osteoarthritis of right hip, status post right total hip arthroplasty on 5/17/2022 with Dr. Lama.  Postop day 0 doing well.    Hypertension  -Okay to restart lisinopril hydrochlorothiazide    Hyperlipidemia  -Okay to resume home medications    Generalized anxiety disorder  -Anxiety medication as needed.  Not currently on anything at home.    Prior history of brain injury with loss of consciousness.  Noted.     Diet: Advance Diet as Tolerated: Regular Diet Adult  Discharge Instruction - Regular Diet Adult    DVT Prophylaxis: aspirin  [place] Catheter: PRESENT, indication: Anesthesia  Central Lines: None  Cardiac Monitoring: None  Code Status: Full Code      Disposition Plan   Expected Discharge:  1-2 days   Anticipated discharge location: home with familyAwaiting care coordination huddle  Delays:   pain control        The patient's care was discussed with the Patient.    Augusta Yeager DO  Hospitalist Service  [facility]  Securely message with the Vocera Web Console (learn more here)  Text page via [employer] Paging/Directory         Clinically Significant Risk Factors Present on Admission                # Platelet Defect: home medication list includes an antiplatelet medication   # Overweight: Estimated body mass index is 29.6 kg/m  as calculated from the following:    Height as of this encounter: 1.702 m (5' 7\").    Weight as of this encounter: 85.7 kg (189 lb).      ______________________________________________________________________    Interval History   Patient status post right total hip arthroplasty.  Doing well.  Has already been up and out of the bed with therapy.  Denies nausea or vomiting.    Data reviewed today: I reviewed all medications, new labs and imaging results over the last 24 hours. I personally reviewed no images or EKG's " today.    Physical Exam   Vital Signs: Temp: 97.7  F (36.5  C) Temp src: Tympanic BP: 133/65 Pulse: 75   Resp: 16 SpO2: 97 % O2 Device: None (Room air) Oxygen Delivery: 2 LPM  Weight: 189 lbs 0 oz  General Appearance: Awake alert and oriented.  No acute distress.  Respiratory: Clear to auscultation bilaterally.  Cardiovascular: Regular rate.  Skin: Warm and dry.  Data   Recent Labs   Lab 05/17/22  0911   *     Recent Results (from the past 24 hour(s))   XR Surgery EITAN Fluoro L/T 5 Min    Narrative    This exam was marked as non-reportable because it will not be read by a   radiologist or a [place] non-radiologist provider.         XR Pelvis Port 1/2 Views    Narrative    Examination:  XR PELVIS PORT 1/2 VIEWS    Date:  5/17/2022 1:30 PM     Clinical Information: Follow-up right hip arthroplasty     Comparison: Films obtained for a 21    Findings:     Single AP view demonstrated a stable appearing total left hip  arthroplasty unchanged from the previous study. Patient is a new right  total hip arthroplasty in good position with no acute complications.      Impression    Impression:    1. Stable total left hip arthroplasty.  2. Acute right hip arthroplasty in good position with no acute  complications..    LEONARDO PAZ MD         SYSTEM ID:  [id number]

## 2022-05-17 NOTE — PROVIDER NOTIFICATION
05/17/22 1400   Initial Information   Patient Belongings remains with patient   Patient Belongings Remaining with Patient clothing;shoes   Did you bring any home meds/supplements to the hospital?  No     [facility] will make every effort per our policy to help keep your items safe while in the hospital.  If you choose to keep any items at the bedside, we cannot be held responsible for any items that are lost or broken.      List items sent to safe: n/a    I have reviewed my belongings list on admission and verify that it is correct.     Patient signature_______________________________  Date/Time_____________________    2nd Staff person if patient unable to sign __________________________  Date/Time ______________________      I have received all my belongings noted above at discharge.    Patient signature________________________________  Date/Time  __________________________

## 2022-05-17 NOTE — INTERVAL H&P NOTE
Brief History of Illness:   Chao Kearney is a 76 year old male who was admitted for right hip pain    Chest clear to auscultation  Heart regular rate and rhythm    Plan right THR    H&P reviewed and patient examined with no new updates from prior exam

## 2022-05-17 NOTE — PLAN OF CARE
SAFETY CHECKLIST  ID Bands and Risk clasps correct and in place (DNR, Fall risk, Allergy, Latex, Limb):  Yes  All Lines Reconciled and labeled correctly: Yes  Whiteboard updated:Yes  Environmental interventions (bed/chair alarm on, call light, side rails, restraints, sitter....): Yes  Verify Tele #: n/a  Zane Craig RN on 5/17/2022 at 2:38 PM

## 2022-05-17 NOTE — ANESTHESIA PROCEDURE NOTES
Other Procedure Note    Pre-Procedure   Staff -        CRNA: Gareth Patel APRN CRNA       Performed By: CRNA       Location: pre-op       Procedure Start/Stop Times: 5/17/2022 10:59 AM and 5/17/2022 11:09 AM       Pre-Anesthestic Checklist: patient identified, IV checked, site marked, risks and benefits discussed, informed consent, monitors and equipment checked, pre-op evaluation, at physician/surgeon's request and post-op pain management  Timeout:       Correct Patient: Yes        Correct Procedure: Yes        Correct Site: Yes        Correct Position: Yes        Correct Laterality: Yes        Site Marked: Yes  Procedure Documentation  Procedure: Other       Diagnosis: PERICAPSULAR NERVE GROUP BLOCK       Laterality: right       Patient Position: supine       Patient Prep/Sterile Barriers: sterile gloves, mask       Skin prep: Chloraprep       Needle Type: insulated       Needle Gauge: 20.        Needle Length (Inches): 6        1. Ultrasound was used to identify targeted nerve, plexus, vascular marker, or fascial plane and place a needle adjacent to it in real-time.       2. Ultrasound was used to visualize the spread of anesthetic in close proximity to the above referenced structure.  Not ultrasound guided       3. A permanent image is entered into the patient's record.    Assessment/Narrative         The placement was positive for bleeding at site.       The placement was negative for: blood aspirated and painful injection       Paresthesias: No.       Bolus given via needle. no blood aspirated via catheter.        Secured via.        Insertion/Infusion Method: Single Shot       Complications: none    Medication(s) Administered   Medication Administration Time: 5/17/2022 10:59 AM

## 2022-05-17 NOTE — ANESTHESIA POSTPROCEDURE EVALUATION
Patient: Chao Kearney    Procedure: Procedure(s):  ARTHROPLASTY, HIP, TOTAL, DIRECT ANTERIOR APPROACH       Anesthesia Type:  Spinal    Note:  Disposition: Inpatient; Admission   Postop Pain Control: Uneventful            Sign Out: Well controlled pain   PONV: No   Neuro/Psych: Uneventful            Sign Out: Acceptable/Baseline neuro status   Airway/Respiratory: Uneventful            Sign Out: Acceptable/Baseline resp. status   CV/Hemodynamics: Uneventful            Sign Out: Acceptable CV status; No obvious hypovolemia; No obvious fluid overload   Other NRE: NONE   DID A NON-ROUTINE EVENT OCCUR? No           Last vitals:  Vitals Value Taken Time   /59 05/17/22 1345   Temp 97.2  F (36.2  C) 05/17/22 1340   Pulse 65 05/17/22 1345   Resp 17 05/17/22 1346   SpO2 99 % 05/17/22 1347   Vitals shown include unvalidated device data.    Electronically Signed By: YUMIKO Culver CRNA  May 17, 2022  2:29 PM

## 2022-05-17 NOTE — INTERVAL H&P NOTE
Brief History of Illness:   Chao Kearney is a 76 year old male who was admitted for right hip pain    H&P reviewed and patient examined with no new updates from prior exam

## 2022-05-17 NOTE — OP NOTE
Procedure Date: 05/17/2022    PREOPERATIVE DIAGNOSIS:  Right hip degenerative arthritis.    POSTOPERATIVE DIAGNOSIS:  Right hip degenerative arthritis.    PROCEDURE:  Right total hip replacement, direct anterior.    SURGEON:  Luis Miguel Lama MD    ASSISTANT:  Fermin Phan PA-C    ANESTHESIA:  Spinal with block    COMPLICATIONS:  Without.    IMPLANTS:    1.  Jayna Continuum cup size 52.  2.  An 11 Taper-Tiera stem, standard offset.  3.  A 0 - 36 ceramic ball.    INDICATIONS:  Mr. Kearney is a 76-year-old with history of right hip pain, progressive in nature, unresponsive to conservative measures.  Recommendation was for total joint reconstruction.  The patient did elect to proceed following a discussion of the risks and benefits.    DESCRIPTION OF PROCEDURE:  The patient was taken to the operative suite, administered anesthesia.  Following spinalization, he was placed about the [place] table.  Right lower extremity prepped and draped in normal sterile fashion.  Preoperative antibiotics administered and timeout was called.  At this point in time, a 10 cm incision was made overlying the TFL fascia, dissected down to the fascia, opened the fascia between the tensor and sartorius, elevated the rectus off the capsule, and did a capsulotomy.  Following that, did a femoral head and neck resection in the pretemplate level.  After that was complete, placed proper retractors about the acetabulum and removed the labrum was well as osteophyte.  Once complete, we then reamed to 51, implanted a 52 Continuum cup with excellent purchase noted with respect to that.  After that was complete, did capsular release about the proximal femur and then placed the leg in external rotation, extension, and adduction.  After that was done, we then properly immobilized this anteriorly and used a canal finder, followed by sequential broaching to a size 11.  Trialed off of that and felt that was an appropriate site.  As such, implanted a size 11  standard offset, trialed a 0 - 36 ceramic ball trial size and felt that was appropriate for leg length stability and rotation.  As such, at this point in time, we then made the committent for a 0 - 36.  We reduced the hip, copiously irrigated.  Hemostasis achieved with bipolar cautery at this point in time, followed by copious irrigation, hemostasis and closure of the capsule with #1 Vicryl, followed by 2-0 Vicryl, staples, and Aquacel dressing.  The patient then was transferred to the recovery room in stable condition, having tolerated the procedure.    POSTOPERATIVE PLAN:  Staples out in 2 weeks.  Discharge tomorrow.  [place] out tomorrow.  Oxycodone for pain management and aspirin for DVT prophylaxis.     A skilled first assistant was necessary for position, intraoperative decision making, retraction and exposure during the procedure.  Furthermore, a skilled first assistant was necessary to complete the procedure in a technically safe and efficient manner.    Luis Miguel Lama MD        D: 2022   T: 2022   MT: MATT    Name:     NABIL VILLANUEVA  MRN:      [id number]        Account:        [id number]   :      1945           Procedure Date: 2022     Document: [id number]

## 2022-05-17 NOTE — ANESTHESIA PREPROCEDURE EVALUATION
Anesthesia Pre-Procedure Evaluation    Patient: Chao Kearney   MRN: [id number] : 1945        Procedure : Procedure(s):  ARTHROPLASTY, HIP, TOTAL, DIRECT ANTERIOR APPROACH          Past Medical History:   Diagnosis Date     Actinic keratosis     No Comments Provided     Anemia     No Comments Provided     Diverticulosis of large intestine without perforation or abscess without bleeding     No Comments Provided     Elevated prostate specific antigen (PSA)     No Comments Provided     Essential (primary) hypertension     No Comments Provided     Generalized anxiety disorder     No Comments Provided     History of colonic polyps     No Comments Provided     Hydrocephalus (H)     (obstructive)     Hyperlipidemia     No Comments Provided     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 Marin 05     Nontraumatic subarachnoid hemorrhage (H)     No Comments Provided     Osteoarthritis of hip     No Comments Provided     Other seborrheic keratosis     No Comments Provided     Other specified disorders of bone, unspecified site (CODE)     ,Lingual sean, bony growth, lower mandible, evaluation Dr. Marin      Pain in right hip     No Comments Provided     Personal history of other medical treatment (CODE)     05,Q wave in 3 and AVF leads on electrocardiogram done     Plantar fascial fibromatosis     No Comments Provided     Retention of urine     No Comments Provided     Screening for other and unspecified cardiovascular conditions     2005,Stress echocardiogram is negative for ischemia with ejection fraction of 85%     Stiffness of unspecified joint, not elsewhere classified     No Comments Provided      Past Surgical History:   Procedure Laterality Date     COLONOSCOPY  2010    (2010),F/U      COLONOSCOPY  10/15/2015    10/15/15,F/U       EXTRACAPSULAR CATARACT EXTRATION WITH INTRAOCULAR LENS IMPLANT           OTHER SURGICAL HISTORY      3/26/14,09301.0,DE TOTAL HIP ARTHROPLASTY,Left,direct anterior approach, Dr. Lama     OTHER SURGICAL HISTORY      ,[id number],CRANIOTOMY     OTHER SURGICAL HISTORY      2016,,HERNIA REPAIR,Left,LIH with mesh      Allergies   Allergen Reactions     Fluress Unknown      Social History     Tobacco Use     Smoking status: Former Smoker     Quit date: 1970     Years since quittin.4     Smokeless tobacco: Never Used     Tobacco comment: smoked off and on for 2 years   Substance Use Topics     Alcohol use: Yes     Alcohol/week: 2.0 standard drinks     Types: 2 Cans of beer per week     Comment: 2 times a week/2 drinks      Wt Readings from Last 1 Encounters:   22 85.7 kg (189 lb)        Anesthesia Evaluation   Pt has had prior anesthetic.     No history of anesthetic complications       ROS/MED HX  ENT/Pulmonary:  - neg pulmonary ROS     Neurologic: Comment: History of subarachnoid hemorrhage.  Continued short term memory loss.       Cardiovascular:     (+) Dyslipidemia hypertension-----    METS/Exercise Tolerance: >4 METS    Hematologic:  - neg hematologic  ROS     Musculoskeletal:  - neg musculoskeletal ROS     GI/Hepatic:  - neg GI/hepatic ROS     Renal/Genitourinary:  - neg Renal ROS     Endo:  - neg endo ROS     Psychiatric/Substance Use:  - neg psychiatric ROS     Infectious Disease:  - neg infectious disease ROS     Malignancy:  - neg malignancy ROS     Other:  - neg other ROS          Physical Exam    Airway        Mallampati: I   TM distance: > 3 FB   Neck ROM: full   Mouth opening: > 3 cm    Respiratory Devices and Support         Dental  no notable dental history         Cardiovascular          Rhythm and rate: regular and normal     Pulmonary   pulmonary exam normal        breath sounds clear to auscultation           OUTSIDE LABS:  CBC:   Lab Results   Component Value Date     WBC 5.7 05/03/2022    WBC 6.3 09/21/2021    HGB 14.6 05/03/2022    HGB 14.4 09/21/2021    HCT 42.9 05/03/2022    HCT 42.6 09/21/2021     05/03/2022     09/21/2021     BMP:   Lab Results   Component Value Date     05/03/2022     09/21/2021    POTASSIUM 3.9 05/03/2022    POTASSIUM 4.2 09/21/2021    CHLORIDE 103 05/03/2022    CHLORIDE 104 09/21/2021    CO2 31 05/03/2022    CO2 29 09/21/2021    BUN 19 05/03/2022    BUN 21 09/21/2021    CR 1.19 05/03/2022    CR 1.15 09/21/2021     (H) 05/17/2022     (H) 05/03/2022     COAGS:   Lab Results   Component Value Date    PTT 30 08/07/2019    INR 0.95 08/07/2019     POC: No results found for: BGM, HCG, HCGS  HEPATIC:   Lab Results   Component Value Date    ALBUMIN 4.3 09/21/2021    PROTTOTAL 6.8 09/21/2021    ALT 14 09/21/2021    AST 23 09/21/2021    ALKPHOS 38 09/21/2021    BILITOTAL 0.6 09/21/2021     OTHER:   Lab Results   Component Value Date    MARCO 9.3 05/03/2022    MAG 2.3 08/07/2019    SED 5 03/03/2014       Anesthesia Plan    ASA Status:  2   NPO Status:  NPO Appropriate    Anesthesia Type: Spinal.   Induction: Intravenous.           Consents    Anesthesia Plan(s) and associated risks, benefits, and realistic alternatives discussed. Questions answered and patient/representative(s) expressed understanding.     - Discussed: Risks, Benefits and Alternatives for BOTH SEDATION and the PROCEDURE were discussed     - Discussed with:  Patient         Postoperative Care       PONV prophylaxis: Ondansetron (or other 5HT-3)     Comments:                YUMIKO TYLER CRNA

## 2022-05-17 NOTE — ANESTHESIA PROCEDURE NOTES
Intrathecal injection Procedure Note    Pre-Procedure   Staff -        CRNA: Gareth Patel APRN CRNA       Performed By: CRNA       Location: OR       Procedure Start/Stop Times: 5/17/2022 11:29 AM and 5/17/2022 11:34 AM       Pre-Anesthestic Checklist: patient identified, IV checked, risks and benefits discussed, informed consent, monitors and equipment checked, pre-op evaluation, at physician/surgeon's request and post-op pain management  Timeout:       Correct Patient: Yes        Correct Procedure: Yes        Correct Site: Yes        Correct Position: Yes   Procedure Documentation  Procedure: intrathecal injection       Patient Position: sitting       Patient Prep/Sterile Barriers: sterile gloves, mask, patient draped       Skin prep: Chloraprep       Insertion Site: L3-4. (midline approach).       Needle Gauge: 25.        Needle Length (Inches): 3.5        Spinal Needle Type: Kristi tip       Introducer used       Introducer: 20 G       # of attempts: 1 and  # of redirects:  1    Assessment/Narrative         Paresthesias: No.       CSF fluid: clear.    Medication(s) Administered   Medication Administration Time: 5/17/2022 11:29 AM

## 2022-05-17 NOTE — PHARMACY-ADMISSION MEDICATION HISTORY
Pharmacy -- Admission Medication Reconciliation    Prior to admission (PTA) medications were reviewed and the patient's PTA medication list was updated.    Sources Consulted: patient interview, sure scripts, chart review    The reliability of this Medication Reconciliation is: Reliability: Reliable    The following significant changes were made:    Updated crestor to daily (not bedtime, takes all medications in the morning)    **uses TCM cream for rash on back of his legs he gets from his shorts.  Does not need while in the hospital.    In addition, the patient's allergies were reviewed with the patient and updated as follows:   Allergies: Fluress    The pharmacist has reviewed with the patient that all personal medications should be removed from the building or locked in the belongings safe.  Patient shall only take medications ordered by the physician and administered by the nursing staff.       Medication barriers identified: none   Medication adherence concerns: none   Understanding of emergency medications: WALESKA Dyer [facility], 5/17/2022,  2:43 PM

## 2022-05-18 ENCOUNTER — NURSE TRIAGE (OUTPATIENT)
Dept: FAMILY MEDICINE | Facility: OTHER | Age: 77
End: 2022-05-18

## 2022-05-18 ENCOUNTER — APPOINTMENT (OUTPATIENT)
Dept: PHYSICAL THERAPY | Facility: OTHER | Age: 77
End: 2022-05-18
Attending: ORTHOPAEDIC SURGERY
Payer: MEDICARE

## 2022-05-18 ENCOUNTER — APPOINTMENT (OUTPATIENT)
Dept: CT IMAGING | Facility: OTHER | Age: 77
End: 2022-05-18
Attending: STUDENT IN AN ORGANIZED HEALTH CARE EDUCATION/TRAINING PROGRAM
Payer: MEDICARE

## 2022-05-18 ENCOUNTER — HOSPITAL ENCOUNTER (EMERGENCY)
Facility: OTHER | Age: 77
Discharge: HOME | End: 2022-05-18
Attending: STUDENT IN AN ORGANIZED HEALTH CARE EDUCATION/TRAINING PROGRAM | Admitting: STUDENT IN AN ORGANIZED HEALTH CARE EDUCATION/TRAINING PROGRAM
Payer: MEDICARE

## 2022-05-18 VITALS
TEMPERATURE: 97.3 F | HEART RATE: 75 BPM | DIASTOLIC BLOOD PRESSURE: 64 MMHG | RESPIRATION RATE: 20 BRPM | WEIGHT: 189 LBS | OXYGEN SATURATION: 98 % | HEIGHT: 67 IN | SYSTOLIC BLOOD PRESSURE: 117 MMHG | BODY MASS INDEX: 29.66 KG/M2

## 2022-05-18 VITALS
TEMPERATURE: 97.4 F | DIASTOLIC BLOOD PRESSURE: 75 MMHG | HEART RATE: 93 BPM | OXYGEN SATURATION: 95 % | WEIGHT: 189 LBS | HEIGHT: 67 IN | SYSTOLIC BLOOD PRESSURE: 142 MMHG | RESPIRATION RATE: 16 BRPM | BODY MASS INDEX: 29.66 KG/M2

## 2022-05-18 DIAGNOSIS — E86.0 DEHYDRATION: ICD-10-CM

## 2022-05-18 DIAGNOSIS — R55 SYNCOPE AND COLLAPSE: ICD-10-CM

## 2022-05-18 LAB
ANION GAP SERPL CALCULATED.3IONS-SCNC: 6 MMOL/L (ref 3–14)
BASOPHILS # BLD AUTO: 0 10E3/UL (ref 0–0.2)
BASOPHILS NFR BLD AUTO: 0 %
BUN SERPL-MCNC: 24 MG/DL (ref 7–25)
CALCIUM SERPL-MCNC: 8.5 MG/DL (ref 8.6–10.3)
CHLORIDE BLD-SCNC: 102 MMOL/L (ref 98–107)
CO2 SERPL-SCNC: 26 MMOL/L (ref 21–31)
CREAT SERPL-MCNC: 1.36 MG/DL (ref 0.7–1.3)
EOSINOPHIL # BLD AUTO: 0 10E3/UL (ref 0–0.7)
EOSINOPHIL NFR BLD AUTO: 0 %
ERYTHROCYTE [DISTWIDTH] IN BLOOD BY AUTOMATED COUNT: 12.1 % (ref 10–15)
GFR SERPL CREATININE-BSD FRML MDRD: 54 ML/MIN/1.73M2
GLUCOSE BLD-MCNC: 179 MG/DL (ref 70–105)
GLUCOSE BLDC GLUCOMTR-MCNC: 127 MG/DL (ref 70–99)
HCT VFR BLD AUTO: 35.3 % (ref 40–53)
HGB BLD-MCNC: 11.9 G/DL (ref 13.3–17.7)
HGB BLD-MCNC: 12.1 G/DL (ref 13.3–17.7)
HOLD SPECIMEN: NORMAL
IMM GRANULOCYTES # BLD: 0 10E3/UL
IMM GRANULOCYTES NFR BLD: 0 %
LYMPHOCYTES # BLD AUTO: 0.5 10E3/UL (ref 0.8–5.3)
LYMPHOCYTES NFR BLD AUTO: 5 %
MCH RBC QN AUTO: 31.5 PG (ref 26.5–33)
MCHC RBC AUTO-ENTMCNC: 34.3 G/DL (ref 31.5–36.5)
MCV RBC AUTO: 92 FL (ref 78–100)
MONOCYTES # BLD AUTO: 1 10E3/UL (ref 0–1.3)
MONOCYTES NFR BLD AUTO: 10 %
NEUTROPHILS # BLD AUTO: 8.8 10E3/UL (ref 1.6–8.3)
NEUTROPHILS NFR BLD AUTO: 85 %
NRBC # BLD AUTO: 0 10E3/UL
NRBC BLD AUTO-RTO: 0 /100
PLATELET # BLD AUTO: 153 10E3/UL (ref 150–450)
POTASSIUM BLD-SCNC: 3.9 MMOL/L (ref 3.5–5.1)
RBC # BLD AUTO: 3.84 10E6/UL (ref 4.4–5.9)
SODIUM SERPL-SCNC: 134 MMOL/L (ref 134–144)
TROPONIN I SERPL-MCNC: 6.7 PG/ML (ref 0–34)
TROPONIN I SERPL-MCNC: 7.2 PG/ML (ref 0–34)
WBC # BLD AUTO: 10.3 10E3/UL (ref 4–11)

## 2022-05-18 PROCEDURE — 97161 PT EVAL LOW COMPLEX 20 MIN: CPT | Mod: GP

## 2022-05-18 PROCEDURE — 250N000011 HC RX IP 250 OP 636: Performed by: ORTHOPAEDIC SURGERY

## 2022-05-18 PROCEDURE — 82962 GLUCOSE BLOOD TEST: CPT

## 2022-05-18 PROCEDURE — 258N000003 HC RX IP 258 OP 636: Performed by: ORTHOPAEDIC SURGERY

## 2022-05-18 PROCEDURE — 99213 OFFICE O/P EST LOW 20 MIN: CPT | Performed by: FAMILY MEDICINE

## 2022-05-18 PROCEDURE — 36415 COLL VENOUS BLD VENIPUNCTURE: CPT | Performed by: ORTHOPAEDIC SURGERY

## 2022-05-18 PROCEDURE — 99283 EMERGENCY DEPT VISIT LOW MDM: CPT | Performed by: STUDENT IN AN ORGANIZED HEALTH CARE EDUCATION/TRAINING PROGRAM

## 2022-05-18 PROCEDURE — 80048 BASIC METABOLIC PNL TOTAL CA: CPT | Performed by: STUDENT IN AN ORGANIZED HEALTH CARE EDUCATION/TRAINING PROGRAM

## 2022-05-18 PROCEDURE — C9803 HOPD COVID-19 SPEC COLLECT: HCPCS | Performed by: STUDENT IN AN ORGANIZED HEALTH CARE EDUCATION/TRAINING PROGRAM

## 2022-05-18 PROCEDURE — 85018 HEMOGLOBIN: CPT | Performed by: STUDENT IN AN ORGANIZED HEALTH CARE EDUCATION/TRAINING PROGRAM

## 2022-05-18 PROCEDURE — 36415 COLL VENOUS BLD VENIPUNCTURE: CPT | Performed by: STUDENT IN AN ORGANIZED HEALTH CARE EDUCATION/TRAINING PROGRAM

## 2022-05-18 PROCEDURE — 85018 HEMOGLOBIN: CPT | Performed by: ORTHOPAEDIC SURGERY

## 2022-05-18 PROCEDURE — 97110 THERAPEUTIC EXERCISES: CPT | Mod: GP

## 2022-05-18 PROCEDURE — G1004 CDSM NDSC: HCPCS

## 2022-05-18 PROCEDURE — 93010 ELECTROCARDIOGRAM REPORT: CPT | Performed by: INTERNAL MEDICINE

## 2022-05-18 PROCEDURE — 250N000013 HC RX MED GY IP 250 OP 250 PS 637: Performed by: ORTHOPAEDIC SURGERY

## 2022-05-18 PROCEDURE — 93005 ELECTROCARDIOGRAM TRACING: CPT | Performed by: STUDENT IN AN ORGANIZED HEALTH CARE EDUCATION/TRAINING PROGRAM

## 2022-05-18 PROCEDURE — 99285 EMERGENCY DEPT VISIT HI MDM: CPT | Mod: 25 | Performed by: STUDENT IN AN ORGANIZED HEALTH CARE EDUCATION/TRAINING PROGRAM

## 2022-05-18 PROCEDURE — 96376 TX/PRO/DX INJ SAME DRUG ADON: CPT

## 2022-05-18 PROCEDURE — 84484 ASSAY OF TROPONIN QUANT: CPT | Performed by: STUDENT IN AN ORGANIZED HEALTH CARE EDUCATION/TRAINING PROGRAM

## 2022-05-18 PROCEDURE — 97116 GAIT TRAINING THERAPY: CPT | Mod: GP

## 2022-05-18 RX ADMIN — LISINOPRIL 40 MG: 40 TABLET ORAL at 08:18

## 2022-05-18 RX ADMIN — ATORVASTATIN CALCIUM 80 MG: 40 TABLET, FILM COATED ORAL at 08:18

## 2022-05-18 RX ADMIN — CEFAZOLIN SODIUM 2 G: 2 INJECTION, SOLUTION INTRAVENOUS at 04:43

## 2022-05-18 RX ADMIN — ACETAMINOPHEN 975 MG: 325 TABLET ORAL at 08:18

## 2022-05-18 RX ADMIN — TAMSULOSIN HYDROCHLORIDE 0.8 MG: 0.4 CAPSULE ORAL at 08:17

## 2022-05-18 RX ADMIN — KETOROLAC TROMETHAMINE 15 MG: 15 INJECTION, SOLUTION INTRAMUSCULAR; INTRAVENOUS at 04:43

## 2022-05-18 RX ADMIN — HYDROCHLOROTHIAZIDE 12.5 MG: 12.5 CAPSULE, GELATIN COATED ORAL at 08:18

## 2022-05-18 RX ADMIN — ACETAMINOPHEN 975 MG: 325 TABLET ORAL at 00:47

## 2022-05-18 RX ADMIN — OXYCODONE HYDROCHLORIDE 5 MG: 5 TABLET ORAL at 08:18

## 2022-05-18 RX ADMIN — SODIUM CHLORIDE, POTASSIUM CHLORIDE, SODIUM LACTATE AND CALCIUM CHLORIDE: 600; 310; 30; 20 INJECTION, SOLUTION INTRAVENOUS at 04:43

## 2022-05-18 RX ADMIN — POLYETHYLENE GLYCOL 3350 17 G: 17 POWDER, FOR SOLUTION ORAL at 08:21

## 2022-05-18 RX ADMIN — SENNOSIDES AND DOCUSATE SODIUM 1 TABLET: 50; 8.6 TABLET ORAL at 08:18

## 2022-05-18 RX ADMIN — ASPIRIN 81 MG: 81 TABLET, COATED ORAL at 08:18

## 2022-05-18 NOTE — PLAN OF CARE
Goal Outcome Evaluation:    Pt is discharging home. No concerns. Mobile without pain. Dressing CDI.

## 2022-05-18 NOTE — ED TRIAGE NOTES
EMS Arrival Note  ________________________________  Chao Kearney is a 76 year old Male that arrives via [facility] ambulance service [facility]  Pre hospital clinical presentation per EMS personnel includes patient had a right hip replaced yesterday at [facility] and home today. At home patient became lightheaded and had a syncopal episode and was caught by daughter at home and no falls.History of brain bleed years ago and has short time memory loss. On arrival EMS states vital signs were 74/53 blood pressure and was pale, cool, and clammy on scene. Alert and orientated and transferred from EMS cart to bed.  Pre hospital personnel report vital signs of:  B/P 104/54; HR 69, SpO2 97  Pre Hospital Cardiac rhythm reported as Normal Sinus  Patient arrives with:  GCS Total = 15  Airway intact  Breathing Assessment Normal.    Circulation Assessment Normal.  Patient arrives with a 18 gauge IV at his right anticubital with 300 ml of normal saline infused upon arrival.    Placed in room 3, gowned, warm blanket provided, side rails up,  ID verified and band placed, and call light within reach.       Previous living situation Spouse`     Triage Assessment     Row Name 05/18/22 5951       Triage Assessment (Adult)    Airway WDL WDL       Respiratory WDL    Respiratory WDL WDL       Skin Circulation/Temperature WDL    Skin Circulation/Temperature WDL WDL       Cardiac WDL    Cardiac WDL WDL;all       Peripheral/Neurovascular WDL    Peripheral Neurovascular WDL WDL       Cognitive/Neuro/Behavioral WDL    Cognitive/Neuro/Behavioral WDL WDL

## 2022-05-18 NOTE — DISCHARGE SUMMARY
[facility]  Hospitalist Discharge Summary      Date of Admission:  5/17/2022  Date of Discharge:  5/18/2022  Discharging Provider: Augusta Yeager DO  Discharge Service: Hospitalist Service    Discharge Diagnoses   Hip surgery    Follow-ups Needed After Discharge   Follow-up Appointments     Follow Up Care      Follow-up with your Surgeon Team in 2 weeks for wound check.         Follow-up and recommended labs and tests       Follow up with Dr. Lama, as scheduled             Unresulted Labs Ordered in the Past 30 Days of this Admission     No orders found for last 31 day(s).      These results will be followed up by     Discharge Disposition   Discharged to home  Condition at discharge: Stable      Hospital Course            Primary osteoarthritis of right hip, status post right total hip arthroplasty on 5/17/2022 with Dr. Lama.  Postop day 0 doing well.    Hypertension  -Okay to restart lisinopril hydrochlorothiazide    Hyperlipidemia  -Okay to resume home medications    Generalized anxiety disorder  -Anxiety medication as needed.  Not currently on anything at home.    Prior history of brain injury with loss of consciousness.  Noted.    Consultations This Hospital Stay   HOSPITALIST IP CONSULT  PHYSICAL THERAPY ADULT IP CONSULT  OCCUPATIONAL THERAPY ADULT IP CONSULT    Code Status   Full Code    Time Spent on this Encounter   I, Augusta Yeager DO, personally saw the patient today and spent less than or equal to 30 minutes discharging this patient.       Augusta Yeager DO  [facility]  [address]   [facility] [address]  Phone: [phone]  Fax: [phone]  ______________________________________________________________________    Physical Exam   Vital Signs: Temp: 97.4  F (36.3  C) Temp src: Tympanic BP: (!) 142/75 Pulse: 93   Resp: 16 SpO2: 95 % O2 Device: None (Room air) Oxygen Delivery: 2 LPM  Weight: 189 lbs 0 oz  General Appearance: Awake, alert  "and oriented.  No acute distress.  Some short-term memory issues but this is his baseline.  Respiratory: Clear to auscultation bilaterally.  No wheezing, rhonchi, rales.  Cardiovascular: Regular rate.  No murmur.  GI: Abdomen soft, nontender, nondistended  Skin: Warm and dry.  Other: Musculoskeletal: Aquacel dressing intact on the right hip.       Primary Care Physician   Chan Cook    Discharge Orders      Reason for your hospital stay    Right total hip replacement     When to call - Contact Surgeon Team    You may experience symptoms that require follow-up before your scheduled appointment. Refer to the \"[employer] Tool\" for instructions on when to contact your Surgeon Team if you are concerned about pain control, blood clots, constipation, or if you are unable to urinate.     When to call - Reach out to Urgent Care    If you are not able to reach your Surgeon Team and you need immediate care, go to the Orthopedic Walk-in Clinic or Urgent Care at your Surgeon's office.  Do NOT go to the Emergency Room unless you have shortness of breath, chest pain, or other signs of a medical emergency.     When to call - Reasons to Call 911    Call 911 immediately if you experience sudden-onset chest pain, arm weakness/numbness, slurred speech, or shortness of breath     Discharge Instruction - Breathing exercises    Perform breathing exercises using your Incentive Spirometer 10 times per hour while awake for 2 weeks.     Symptoms - Fever Management    A low grade fever can be expected after surgery.  Use acetaminophen (TYLENOL) as needed for fever management.  Contact your Surgeon Team if you have a fever greater than 101.5 F, chills, and/or night sweats.     Symptoms - Constipation management    Constipation (hard, dry bowel movements) is expected after surgery due to the combination of being less active, the anesthetic, and the opioid pain medication.  You can do the following to help reduce constipation:  ~  FLUIDS:  " Drink clear liquids (water or Gatorade), or juice (apple/prune).  ~  DIET:  Eat a fiber rich diet.    ~  ACTIVITY:  Get up and move around several times a day.  Increase your activity as you are able.  MEDICATIONS:  Reduce the risk of constipation by starting medications before you are constipated.  You can take Miralax   (1 packet as directed) and/or a stool softener (Senokot 1-2 tablets 1-2 times a day).  If you already have constipation and these medications are not working, you can get magnesium citrate and use as directed.  If you continue to have constipation you can try an over the counter suppository or enema.  Call your Surgeon Team if it has been greater than 3 days since your last bowel movement.     Symptoms - Reduced Urine Output    Changes in the amount of fluids you drank before and after surgery may result in problems urinating.  It is important to stay well-hydrated after surgery and drink plenty of water. If it has been greater than 8 hours since you have urinated despite drinking plenty of water, call your Surgeon Team.     Activity - Exercises to prevent blood clots    Unless otherwise directed by your Surgeon team, perform the following exercises at least three times per day for the first four weeks after surgery to prevent blood clots in your legs: 1) Point and flex your feet (Ankle Pumps), 2) Move your ankle around in big circles, 3) Wiggle your toes, 4) Walk, even for short distances, several times a day, will help decrease the risk of blood clots.     Comfort and Pain Management - Pain after Surgery    Pain after surgery is normal and expected.  You will have some amount of pain for several weeks after surgery.  Your pain will improve with time.  There are several things you can do to help reduce your pain including: rest, ice, elevation, and using pain medications as needed. Contact your Surgeon Team if you have pain that persists or worsens after surgery despite rest, ice, elevation, and  taking your medication(s) as prescribed. Contact your Surgeon Team if you have new numbness, tingling, or weakness in your operative extremity.     Comfort and Pain Management - Swelling after Surgery    Swelling and/or bruising of the surgical extremity is common and may persist for several months after surgery. In addition to frequent icing and elevation, gentle compressive support with an ACE wrap or tubigrip may help with swelling. Apply compression regularly, removing at least twice daily to perform skin checks. Contact your Surgeon Team if your swelling increases and is NOT associated with an increase in your activity level, or if your swelling increases and is associated with redness and pain.     Comfort and Pain Management - Cold therapy    Ice can be used to control swelling and discomfort after surgery. Place a thin towel over your operative site and apply the ice pack overtop. Leave ice pack in place for 20 minutes, then remove for 20 minutes. Repeat this 20 minutes on/20 minutes off routine as often as tolerated.     Medication Instructions - Acetaminophen (TYLENOL) Instructions    You were discharged with acetaminophen (TYLENOL) for pain management after surgery. Acetaminophen most effectively manages pain symptoms when it is taken on a schedule without missing doses (every four, six, or eight hours). Your Provider will prescribe a safe daily dose between 3000 - 4000 mg.  Do NOT exceed this daily dose. Most patients use acetaminophen for pain control for the first four weeks after surgery.  You can wean from this medication as your pain decreases.     Medication Instructions - NSAID Instructions    You were discharged with an anti-inflammatory medication for pain management to use in combination with acetaminophen (TYLENOL) and the narcotic pain medication.  Take this medication exactly as directed.  You should only take one anti-inflammatory at a time.  Some common anti-inflammatories include:  ibuprofen (ADVIL, MOTRIN), naproxen (ALEVE, NAPROSYN), celecoxib (CELEBREX), meloxicam (MOBIC), ketorolac (TORADOL).  Take this medication with food and water.     Medication Instructions - Opioids - Tapering Instructions    In the first three days following surgery, your symptoms may warrant use of the narcotic pain medication every four to six hours as prescribed. This is normal. As your pain symptoms improve, focus your efforts on decreasing (tapering) use of narcotic medications. The most successful tapering strategy is to first, decrease the number of tablets you take every 4-6 hours to the minimum prescribed. Then, increase the amount of time between doses.  For example:  First, taper to   or 1 tablet every 4-6 hours.  Then, taper to   or 1 tablet every 6-8 hours.  Then, taper to   or 1 tablet every 8-10 hours.  Then, taper to   or 1 tablet every 10-12 hours.  Then, taper to   or 1 tablet at bedtime.  The bedtime dose can help with comfort during sleep and is typically the last dose to be discontinued after surgery.     Follow Up Care    Follow-up with your Surgeon Team in 2 weeks for wound check.     Medication instructions -  Anticoagulation - aspirin    Take the aspirin as prescribed for a total of four weeks after surgery.  This is given to help minimize your risk of blood clot.     Medication Instructions - Opioid Instructions (Greater than or equal to 65 years)    You were discharged with an opioid medication (hydromorphone, oxycodone, hydrocodone, or tramadol). This medication should only be taken for breakthrough pain that is not controlled with acetaminophen (TYLENOL). If you rate your pain less than 3 you do not need this medication.  Pain rating 0-3:  You do not need this medication  Pain rating 4-6:  Take 1/2 tablet every 4-6 hours as needed  Pain rating 7-10:  Take 1 tablet every 4-6 hours as needed  Do not exceed 4 tablets per day     Activity - Total Hip Arthroplasty    Refer to the [facility]  "[place] \"Your Guide to Total Joint Replacement\" for recommendations on activities and Exercises.     Return to Driving    Return to driving - Driving is NOT permitted until directed by your provider. Under no circumstance are you permitted to drive while using narcotic pain medications.     Dressing / Wound Care - Wound    You have a clean dressing on your surgical wound. Dressing change instructions as follows: dressing will be removed at your follow-up appointment. Contact your Surgeon Team if you have increased redness, warmth around the surgical wound, and/or drainage from the surgical wound.     Dressing / Wound Care - NO Tub Bathing    Tub bathing, swimming, or any other activities that will cause your incision to be submerged in water should be avoided until allowed by your Surgeon.     No precautions    No precautions directed by your Provider.  You may perform range of motion activities as tolerated.     Weight bearing as tolerated    Weight bearing as tolerated on your operative extremity.     Dressing / Wound care - Shower with wound/dressing covered    You must COVER your dressing or incision with saran wrap (or any other non-permeable covering) to allow the incision to remain dry while showering.  You may shower 3 days after surgery as long as the surgical wound stays dry. Continue to cover your dressing or incision for showering until your first office visit.  You are strictly prohibited from soaking   or submerging the surgical wound underwater.     Comfort and Pain Management - LOWER Extremity Elevation    Swelling is expected for several months after surgery. This type of swelling is usually associated with gravity and activity, and can be improved with elevation.   The best way to do this is to get your \"toes above your nose\" by laying down and placing several pillows lengthwise under your calf and heel. When elevating your leg keep your knee completely straight. Perform this elevation as often as " possible especially for the first two weeks after surgery.     Reason for your hospital stay    Hip surgery     Follow-up and recommended labs and tests     Follow up with Dr. Lama, as scheduled     Discharge Instruction - Regular Diet Adult    Return to your pre-surgery diet unless instructed otherwise     Diet    Follow this diet upon discharge: Orders Placed This Encounter      Advance Diet as Tolerated: Regular Diet Adult      Discharge Instruction - Regular Diet Adult       Significant Results and Procedures   Most Recent 3 CBC's:Recent Labs   Lab Test 05/18/22  0528 05/03/22  0956 09/21/21  0952 10/20/20  1124   WBC  --  5.7 6.3 6.7   HGB 11.9* 14.6 14.4 14.7   MCV  --  91 93 93   PLT  --  181 171 204   ,   Results for orders placed or performed during the hospital encounter of 05/17/22   XR Pelvis Port 1/2 Views    Narrative    Examination:  XR PELVIS PORT 1/2 VIEWS    Date:  5/17/2022 1:30 PM     Clinical Information: Follow-up right hip arthroplasty     Comparison: Films obtained for a 21    Findings:     Single AP view demonstrated a stable appearing total left hip  arthroplasty unchanged from the previous study. Patient is a new right  total hip arthroplasty in good position with no acute complications.      Impression    Impression:    1. Stable total left hip arthroplasty.  2. Acute right hip arthroplasty in good position with no acute  complications..    LEONARDO PAZ MD         SYSTEM ID:  [id number]       Discharge Medications   Current Discharge Medication List      START taking these medications    Details   acetaminophen (TYLENOL) 325 MG tablet Take 2 tablets (650 mg) by mouth every 4 hours as needed for other (mild pain)  Qty: 100 tablet, Refills: 0    Associated Diagnoses: Right hip pain      hydrOXYzine (ATARAX) 10 MG tablet Take 1 tablet (10 mg) by mouth every 6 hours as needed for itching or anxiety (with pain, moderate pain)  Qty: 30 tablet, Refills: 0    Associated Diagnoses: Right  hip pain      oxyCODONE (ROXICODONE) 5 MG tablet Take 1-2 tablets (5-10 mg) by mouth every 3 hours as needed for pain (Moderate to Severe)  Qty: 30 tablet, Refills: 0    Associated Diagnoses: Right hip pain      senna-docusate (SENOKOT-S/PERICOLACE) 8.6-50 MG tablet Take 1-2 tablets by mouth 2 times daily Take while on oral narcotics to prevent or treat constipation.  Qty: 30 tablet, Refills: 0    Comments: While taking narcotics  Associated Diagnoses: Right hip pain         CONTINUE these medications which have CHANGED    Details   aspirin 81 MG EC tablet Take 1 tablet (81 mg) by mouth 2 times daily  Qty: 60 tablet, Refills: 0    Associated Diagnoses: Right hip pain         CONTINUE these medications which have NOT CHANGED    Details   hydrochlorothiazide (HYDRODIURIL) 12.5 MG tablet TAKE 1 TABLET EVERY DAY  Qty: 90 tablet, Refills: 3    Associated Diagnoses: Essential hypertension, benign      lisinopril (ZESTRIL) 40 MG tablet TAKE 1 TABLET EVERY DAY  Qty: 90 tablet, Refills: 3    Associated Diagnoses: Essential hypertension, benign      meclizine (ANTIVERT) 12.5 MG tablet TAKE 2 TABLETS(25 MG) BY MOUTH FOUR TIMES DAILY AS NEEDED FOR DIZZINESS  Qty: 20 tablet, Refills: 3    Associated Diagnoses: Vertigo      rosuvastatin (CRESTOR) 20 MG tablet Take 20 mg by mouth daily      tamsulosin (FLOMAX) 0.4 MG capsule Take 2 capsules (0.8 mg) by mouth daily with food  Qty: 180 capsule, Refills: 3    Associated Diagnoses: Benign non-nodular prostatic hyperplasia with lower urinary tract symptoms      triamcinolone (KENALOG) 0.1 % external cream Apply topically to affected area(s) 3 times daily.  Qty: 30 g, Refills: 1    Associated Diagnoses: Eczema, unspecified type      Cholecalciferol (VITAMIN D3) 1000 UNITS CAPS Take 1 capsule by mouth daily      Multiple Vitamins-Minerals (MULTILEX-T&M) TABS Take 1 tablet by mouth daily           Allergies   Allergies   Allergen Reactions     Fluress Unknown

## 2022-05-18 NOTE — TELEPHONE ENCOUNTER
"S-(situation): Syncopal episode and profuse sweating shortly after getting home following surgery.    *Patient verified his last name and  and gave verbal permission to discuss PHI with daughter Chana.    B-(background): Total Hip Arthroplasty by Dr. Lama yesterday. Discharged home today. Arrived home 1.5 hours ago.    Brain injury (H)  Hydrocephalus (H)  Right hip pain  SAH (subarachnoid hemorrhage) (H)    A-(assessment): Daughter states Pt was c/o hip pain when he got home, walking around, and then sat down in chair to talk to his son on the telephone. Daughter heard telephone fall to the floor. Daughter states he fainted and lost consciousness for approximately 15 seconds. Pt groggy, confused and \"mumbly jumbly\" when he came to, sweating profusely, extremely tired and wants to sleep and c/o neck pain. BP: 110/89, P: 62.    R-(recommendations): Protocol recommends daughter call  Now, due to Pt fainting and still feeling dizzy or lightheaded, to R/O hypovolemia, arrhythmia. Daughter indicates understanding of these issues and agrees with the plan to discuss this with Pt. If he refuses 911, she would plan to bring him to ED immediately.     FYI to Dr. Lama.     Martine Bowie RN .............. 2022  2:58 PM       Reason for Disposition    Still feels dizzy or lightheaded    Additional Information    Negative: Still unconscious    Negative: Has diabetes (diabetes mellitus) and fainting from low blood sugar (i.e., < 70 mg/dL or 3.9 mmol/L)    Negative: Seizure suspected (e.g., muscle jerking or shaking followed by confusion)    Negative: Heat exhaustion suspected (i.e., dehydration from heat exposure)    Negative: Chest pain    Negative: Extra heart beats or heart is beating fast (i.e., palpitations)    Negative: Heart beating < 50 beats per minute OR > 140 beats per minute    Negative: Fainted suddenly after medicine, allergic food or bee sting    Negative: Shock suspected (e.g., cold/pale/clammy " skin, too weak to stand, low BP, rapid pulse)    Negative: Bleeding (e.g., vomiting blood, rectal bleeding or tarry stools, severe vaginal bleeding)    Negative: Bluish (or gray) lips or face    Negative: Difficulty breathing    Negative: Difficult to awaken or acting confused (e.g., disoriented, slurred speech)    Negative: Sounds like a life-threatening emergency to the triager    Protocols used: [id number]

## 2022-05-18 NOTE — PROGRESS NOTES
Subjective: The patient is POD #1 s/p R Total Hip Arthroplasty.  The patients pain is controlled fairly well.  They deny shortness of breath, chest pain, nausea or vomiting.  There have been no acute perioperative complications    Objective:   B/P: 142/75, Temp: 97.4, Pulse: 93, Resp: 16    Alert and Orientated x3; No Acute Distress; Appears comfortable     Hip Exam: dressing/wound is clean, dry, intact without significant drainage.  No erythema or signs of infection.     No evidence of DVT    Distal motor/sensory exam is intact in the superficial peroneal, deep peroneal and tibial nerve distributions.      Normal capillary refill with a palpable dorsalis pedis pulse.    Hemoglobin   Date Value Ref Range Status   05/18/2022 11.9 (L) 13.3 - 17.7 g/dL Final   10/20/2020 14.7 13.3 - 17.7 g/dL Final       Assessment/Plan:     1) POD # 1 S/P R Total Hip Arthroplasty  -Pain controlled  [place] out  -PT/OT--- Strengthening and Gait training  -WBAT; No Hip ROM Precautions  -DVT prophylaxis  -Dispo--To home when cleared Medically and by PT.   -Follow-Up in [facility] in 2 weeks  -Hospitalist co-management

## 2022-05-18 NOTE — PROGRESS NOTES
Discharge Note      Data:  Chao Kearney discharged to home at 1130 via wheel chair. Accompanied by staff.    Action:  Written discharge/follow-up instructions were provided to patient. Prescriptions sent to patients preferred pharmacy. All belongings sent with patient.    Response:  Patient and wife verbalized understanding of discharge instructions, reason for discharge, and necessary follow-up appointments.

## 2022-05-18 NOTE — ED PROVIDER NOTES
"  History     Chief Complaint   Patient presents with     Syncope     Hypotension     HPI  Chao Kearney is a 76 year old male s/p right hip replacement yesterday presenting with syncope and hypotension. Patient also has short term memory loss due to subarachnoid hemmorage many years ago making history of present illness difficult to obtain. Per report by EMS and patient, patient was bending over and standing up with the syncopal episode happened. Unsure of whether or not he fully lost consiousness as patient does not remember event. He was caught be his daughter preventing him from falling or injurying himself. They called EMS after this event who noted his blood pressure to be 70s over 40s on arrival. En-route to facility, patient was given 300 mL IVF with pressures rebounding to 120s/70s. He denies pain on arrival. No nausea or vomiting. No chest pain or shortness of breath. He does endorse that he \"is dehydrated\" and has been eating and drinking less the past few days leading up to surgery. Prior, he reports he feels well and was healthy and does not believe he has had episodes like this before. Denies fevers, chills, bleeding or other abnormality from hip replacement site.     Allergies:  Allergies   Allergen Reactions     Fluress Unknown       Problem List:    Patient Active Problem List    Diagnosis Date Noted     Generalized anxiety disorder 01/17/2018     Priority: Medium     Elevated prostate specific antigen (PSA) 01/17/2018     Priority: Medium     Overview:   in October 2005.  The patient is taking Levaquin daily for two months.  He   will have another PSA checked in October 2006, recommended by Dr. Wilson.       Hyperlipidemia 01/17/2018     Priority: Medium     Hypertension 01/17/2018     Priority: Medium     Plantar fasciitis 01/17/2018     Priority: Medium     Overview:   right       Benign non-nodular prostatic hyperplasia with lower urinary tract symptoms 07/27/2016     Priority: Medium     Brain " injury (H) 01/16/2015     Priority: Medium     Retention of urine 12/12/2014     Priority: Medium     Right hip pain 10/10/2014     Priority: Medium     Anemia 09/22/2014     Priority: Medium     Hydrocephalus (H) 09/22/2014     Priority: Medium     SAH (subarachnoid hemorrhage) (H) 09/22/2014     Priority: Medium     Actinic keratosis 01/28/2014     Priority: Medium     Seborrheic keratosis 01/28/2014     Priority: Medium     H/O adenomatous polyp of colon 04/27/2010     Priority: Medium     Diverticulosis of sigmoid colon 04/26/2010     Priority: Medium        Past Medical History:    Past Medical History:   Diagnosis Date     Actinic keratosis      Anemia      Diverticulosis of large intestine without perforation or abscess without bleeding      Elevated prostate specific antigen (PSA)      Essential (primary) hypertension      Generalized anxiety disorder      History of colonic polyps      Hydrocephalus (H)      Hyperlipidemia      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)      Lesion of oral mucosa      Nontraumatic subarachnoid hemorrhage (H)      Osteoarthritis of hip      Other seborrheic keratosis      Other specified disorders of bone, unspecified site (CODE)      Pain in right hip      Personal history of other medical treatment (CODE)      Plantar fascial fibromatosis      Retention of urine      Screening for other and unspecified cardiovascular conditions      Stiffness of unspecified joint, not elsewhere classified        Past Surgical History:    Past Surgical History:   Procedure Laterality Date     ARTHROPLASTY HIP ANTERIOR Right 5/17/2022    Procedure: ARTHROPLASTY, HIP, TOTAL, DIRECT ANTERIOR APPROACH;  Surgeon: Luis Miguel Lama MD;  Location:  OR     COLONOSCOPY  04/26/2010    (04/26/2010),F/U 2015     COLONOSCOPY  10/15/2015    10/15/15,F/U 2025     EXTRACAPSULAR CATARACT EXTRATION WITH INTRAOCULAR LENS IMPLANT      2016     OTHER SURGICAL HISTORY      3/26/14,73323.0,WA TOTAL HIP  "ARTHROPLASTY,Left,direct anterior approach, Dr. Lama     OTHER SURGICAL HISTORY      2014,[id number],CRANIOTOMY     OTHER SURGICAL HISTORY      2016,,HERNIA REPAIR,Left,LIH with mesh       Family History:    Family History   Problem Relation Age of Onset     Cancer Mother 78        Cancer, lung cancer at age 78, developed brain metastases     Heart Disease Father 50        Heart Disease,heart failure     Other - See Comments Father          MS, long-standing     Other - See Comments Sister          MS     Cancer Paternal Grandfather         Cancer, of cancer of unknown type       Social History:  Marital Status:   [2]  Social History     Tobacco Use     Smoking status: Former Smoker     Quit date: 1970     Years since quittin.4     Smokeless tobacco: Never Used     Tobacco comment: smoked off and on for 2 years   Vaping Use     Vaping Use: Never used   Substance Use Topics     Alcohol use: Yes     Alcohol/week: 2.0 standard drinks     Types: 2 Cans of beer per week     Comment: 2 times a week/2 drinks     Drug use: No     Comment: Drug use: No        Medications:    acetaminophen (TYLENOL) 325 MG tablet  aspirin 81 MG EC tablet  Cholecalciferol (VITAMIN D3) 1000 UNITS CAPS  hydrochlorothiazide (HYDRODIURIL) 12.5 MG tablet  hydrOXYzine (ATARAX) 10 MG tablet  lisinopril (ZESTRIL) 40 MG tablet  meclizine (ANTIVERT) 12.5 MG tablet  Multiple Vitamins-Minerals (MULTILEX-T&M) TABS  oxyCODONE (ROXICODONE) 5 MG tablet  rosuvastatin (CRESTOR) 20 MG tablet  senna-docusate (SENOKOT-S/PERICOLACE) 8.6-50 MG tablet  tamsulosin (FLOMAX) 0.4 MG capsule  triamcinolone (KENALOG) 0.1 % external cream      Review of Systems   Please see HPI for pertinent positives and negatives.  All other systems reviewed and found to be negative.    Physical Exam   BP: 129/62  Pulse: 70  Temp: 97.3  F (36.3  C)  Resp: 18  Height: 170.2 cm (5' 7\")  Weight: 85.7 kg (189 lb)  SpO2: 99 %      Physical Exam   HEENT: " Normocephalic and atraumatic. No scleral icterus. No conjunctival injection is noted.   NECK: Supple. Trachea is midline.   LUNGS: Breath sounds are equal and clear bilaterally. No wheezes, rhonchi, or rales.  HEART: Regular rate and rhythm with normal S1 and S2. No murmurs, gallops, or rubs.  ABDOMEN: Soft and flat. No mass, tenderness, guarding, or rebound.   SKIN/EXTREMITIES: Bandaged lesion overlying site of right hip replacement site noted. Mild ecchymoses surrounding wound without apparent erythema, edema or pus drainage. Remainder of MSK exam demonstrated no cyanosis, clubbing, or edema. Warm, dry, and well perfused. Good turgor.   MSK: Grossly moves all extremities. No lower extremity edema appreciated   NEUROLOGIC: No focal sensory or motor deficits are noted. Gait is normal.   PSYCHIATRIC: The patient is awake, alert, and oriented x3. Short-term memory loss noted with long term memory appearing grossly intact.     ED Course          Procedures           Results for orders placed or performed during the hospital encounter of 05/18/22 (from the past 24 hour(s))   CBC with platelets differential    Narrative    The following orders were created for panel order CBC with platelets differential.  Procedure                               Abnormality         Status                     ---------                               -----------         ------                     CBC with platelets and d...[[id number]]  Abnormal            Final result                 Please view results for these tests on the individual orders.   Basic metabolic panel   Result Value Ref Range    Sodium 134 134 - 144 mmol/L    Potassium 3.9 3.5 - 5.1 mmol/L    Chloride 102 98 - 107 mmol/L    Carbon Dioxide (CO2) 26 21 - 31 mmol/L    Anion Gap 6 3 - 14 mmol/L    Urea Nitrogen 24 7 - 25 mg/dL    Creatinine 1.36 (H) 0.70 - 1.30 mg/dL    Calcium 8.5 (L) 8.6 - 10.3 mg/dL    Glucose 179 (H) 70 - 105 mg/dL    GFR Estimate 54 (L) >60 mL/min/1.73m2    Troponin I   Result Value Ref Range    Troponin I 6.7 0.0 - 34.0 pg/mL   CBC with platelets and differential   Result Value Ref Range    WBC Count 10.3 4.0 - 11.0 10e3/uL    RBC Count 3.84 (L) 4.40 - 5.90 10e6/uL    Hemoglobin 12.1 (L) 13.3 - 17.7 g/dL    Hematocrit 35.3 (L) 40.0 - 53.0 %    MCV 92 78 - 100 fL    MCH 31.5 26.5 - 33.0 pg    MCHC 34.3 31.5 - 36.5 g/dL    RDW 12.1 10.0 - 15.0 %    Platelet Count 153 150 - 450 10e3/uL    % Neutrophils 85 %    % Lymphocytes 5 %    % Monocytes 10 %    % Eosinophils 0 %    % Basophils 0 %    % Immature Granulocytes 0 %    NRBCs per 100 WBC 0 <1 /100    Absolute Neutrophils 8.8 (H) 1.6 - 8.3 10e3/uL    Absolute Lymphocytes 0.5 (L) 0.8 - 5.3 10e3/uL    Absolute Monocytes 1.0 0.0 - 1.3 10e3/uL    Absolute Eosinophils 0.0 0.0 - 0.7 10e3/uL    Absolute Basophils 0.0 0.0 - 0.2 10e3/uL    Absolute Immature Granulocytes 0.0 <=0.4 10e3/uL    Absolute NRBCs 0.0 10e3/uL   Troponin I   Result Value Ref Range    Troponin I 7.2 0.0 - 34.0 pg/mL   CT Head w/o Contrast    Narrative    PROCEDURE: CT HEAD W/O CONTRAST     HISTORY: Headache, intracranial hemorrhage suspected.    COMPARISON: 10/20/2020    TECHNIQUE:  Helical images of the head from the foramen magnum to the  vertex were obtained without contrast.    FINDINGS: The ventricles and sulci are unchanged in volume. No acute  intracranial hemorrhage, mass effect, midline shift, hydrocephalus or  basilar cystern effacement are present.    Encephalomalacia in the right frontal lobe and cerebellum is  unchanged. No new transcortical ischemia is identified    The calvarium is intact.       Impression    IMPRESSION: No acute intracranial hemorrhage. Unchanged CT appearance  of the brain.      CHASE BROTHERS MD         SYSTEM ID:  [id number]       Medications - No data to display    Assessments & Plan (with Medical Decision Making)   1. Syncopal episode  2. Hypotension  3. Dehydration  Most likely etiology of event following work-up  and description of story seems that syncopal episode due to orthostatic hypotension secondary to dehydration surrounding recent hip surgery and decreased oral intake. Patient was hypotensive on initial evaluation by EMS but rebounded with IVF. Additional considerations were given to vasovagal, also arrhythmia vs infection vs less likely PE. EKG and troponin demonstrated normal sinus without evidence of STEMI or current injury and no evidence of abnormal interval and serial tropoinin was WNL; acute cardiac etiology unlikely. Could consider event monitor in future if reoccurring episodes present. CBC with stable anemia and hemoglobin of 12.1, normal white count and platelets Given history of atraumatic subarachnoid hemorrhageCT head was obtained to further evaluate. Results demonstrated No acute intracranial hemorrhage. Unchanged CT appearance of the brain. Creatine bumped to 1.36 from baseline of 1.1 likely due to dehydration secondary to recent decreased oral intake. Feeling better after fluids without acute concerns. Up out of bed and no lightheadedness. Plan for discharge with very close outpatient follow-up, continue good hydration and PO intake at home, strict return precautions reviewed. Hemodynamically stable on discharge. Warning signs discussed. Patient in agreement with plan. Follow-up if worsening.     Patient feeling well on recheck, no acute concerns.   I have reviewed the nursing notes.    I have reviewed the findings, diagnosis, plan and need for follow up with the patient.    New Prescriptions    No medications on file       Final diagnoses:   Syncope and collapse   Dehydration     [facility] Medical Student Year 3     ATTENDING ATTESTATION   I evaluated patient independent of the medical student, pertinent results and findings including MDM and ED course below:  ED Course as of 05/19/22 1416   Wed May 18, 2022   1611 [facility] yesterday, discharged this morning. Brief syncopal event at home, BP's on  EMS arrival 70's/40's, patient without much recollection of this (h/o poor short-term memory). Now improving with fluids (300 mL from EMS). Poor oral intake since surgery. Now appears well, afebrile, saturating well on room air. Surrounding skin re: wound unremarkable. No CP/SOB. Incontinence with syncopal episode. Ddx includes orthostatic event vs vasovagal, also arrhythmia vs infection vs less likely PE   1623 EKG reviewed, sinus rhythm with PAC, rate of 71 bpm, normal MT interval, QRS, and QTc, no STEMI or current of injury   1632 Patient reporting some posterior neck pain and headache, no tenderness there, no trauma during the fall. Given history of atraumatic subarachnoid hemorrhage plan for CT head to further evaluate, could be earlier intra-cranial hemorrhage, unlikely carotid or vertebral dissection or occlusion   1650 CBC with stable anemia and hemoglobin of 12.1, normal white count and platelets   1707 Glucose slightly elevated to 179 mg/dL. Creatinine is elevated to 1.36 from baseline ~1.10, likely related to recent poor oral intake   1707 Troponin within normal limits, plan for serial troponin testing given high-risk features   1813 Repeat troponin no significant change, ACS effectively excluded   1824 CT head final report:  IMPRESSION: No acute intracranial hemorrhage. Unchanged CT appearance  of the brain.     1834 Patient feeling well on recheck, no acute concerns. Up out of bed and no lightheadedness. Plan for discharge with very close outpatient follow-up, continue good hydration and PO intake at home, strict return precautions reviewed     Findings most c/w dehydration and vasovagal or orthostatic episode. No evidence of clinically-significant arrhythmia here, some PAC's occasionally but nothing else concerning on telemetry. Mild MARLI, given fluid bolus; urine quite concentrated, patient feeling better on recheck, no further lightheadedness. No evidence of ACS, serial troponin testing stable/normal  and EKG non-ischemic. CT head obtained to rule out SAH given history, this was negative. Hemoglobin stable from hospital discharge earlier today. Patient appropriate for discharge with close outpatient follow-up, strict return precautions reviewed.    From ED discharge instructions:  You likely had a fainting spell due to significant dehydration and not eating much the last couple of days. No evidence of dangerous problems with your heart or brain were discovered today. You were given some IV fluids in the ER and felt better.    Continue eating a well-balanced diet and drinking plenty of water at home.    Hold your hydrochlorothiazide (blood pressure medication) until you follow up with your doctor in clinic.    Make an appointment to follow up in clinic in 2 days (Friday, 5/20) for a recheck.    Come back to the ER if further episodes of fainting, new chest pain or difficulty breathing, severe headache or new numbness or weakness on one side or the other, or any other acute concerns.      5/18/2022   [facility] MD  05/19/22 9841

## 2022-05-18 NOTE — DISCHARGE INSTRUCTIONS
You likely had a fainting spell due to significant dehydration and not eating much the last couple of days. No evidence of dangerous problems with your heart or brain were discovered today. You were given some IV fluids in the ER and felt better.    Continue eating a well-balanced diet and drinking plenty of water at home.    Hold your hydrochlorothiazide (blood pressure medication) until you follow up with your doctor in clinic.    Make an appointment to follow up in clinic in 2 days (Friday, 5/20) for a recheck.    Come back to the ER if further episodes of fainting, new chest pain or difficulty breathing, severe headache or new numbness or weakness on one side or the other, or any other acute concerns.

## 2022-05-18 NOTE — PROGRESS NOTES
Call placed to wife (home phone) no answer.         SAFETY CHECKLIST  ID Bands and Risk clasps correct and in place (DNR, Fall risk, Allergy, Latex, Limb):  Yes  All Lines Reconciled and labeled correctly: Yes  Whiteboard updated:Yes  Environmental interventions (bed/chair alarm on, call light, side rails, restraints, sitter....): Yes  Verify Tele #:

## 2022-05-18 NOTE — PLAN OF CARE
Patient POD 1 following right hip replacement. Dressing CDI, no drainage. Polar cooler in place. Denies numbness/tingling. Reports moderate pain, PRN oxycodone given. Up ambulating in hallway. [place] catheter in place, order to discontinue at 0500. Good urine output. Patient AxO but has short-term memory loss and requires reminders of current reason for hospitalization. Up SBA with walker and gait belt.     Patient 1:1 sitter after trying to repeatedly get out of bed d/t forgetting surroundings and situation. Sitter at bedside for safety. Pt became paranoid and asked to speak to manager, house supervisor in room explaining situation.

## 2022-05-18 NOTE — PHARMACY - DISCHARGE MEDICATION RECONCILIATION AND EDUCATION
Pharmacy: Discharge Counseling and Medication Reconciliation    Chao Kearney  [address]  [facility] [id number] [phone] (home)   76 year old male  PCP:Chan Cook    Allergies   Allergen Reactions     Fluress Unknown       Discharge Counseling:    Pharmacist met with patient (and/or family) today to review the medication portion of the After Visit Summary (with an emphasis on NEW medications) and to address patient's questions/concerns.     Summary of Education:   Met with patient at time of discharge to review all changes in medications including new hydroxyzine, acetaminophen, oxycodone, aspirin, and senna/docusate. Discussed indications, directions for use, and possible side effects. Patient asked a few questions and all concerns were addressed.     Materials Provided:   MedCounselor sheets printed from Clinical Pharmacology on: hydroxyzine, oxycodone, aspirin, and senna/docusate    Discharge Medication Reconciliation:    Celia Painting RPH has reviewed the patient's discharge medication orders and has compared them to the inpatient medication administration record and to what the patient was taking prior to admission- any discrepancies have been resolved.     It has been determined that the patient has an adequate supply of medications available or which can be obtained from the patient's preferred pharmacy, which has been confirmed as: [facility] in [place], [place].     Thank you for the consult.     Celia Painting RPH ....................  5/18/2022   9:12 AM

## 2022-05-19 ENCOUNTER — PATIENT OUTREACH (OUTPATIENT)
Dept: CARE COORDINATION | Facility: CLINIC | Age: 77
End: 2022-05-19
Payer: COMMERCIAL

## 2022-05-19 LAB
ATRIAL RATE - MUSE: 71 BPM
DIASTOLIC BLOOD PRESSURE - MUSE: NORMAL MMHG
INTERPRETATION ECG - MUSE: NORMAL
P AXIS - MUSE: 67 DEGREES
PR INTERVAL - MUSE: 126 MS
QRS DURATION - MUSE: 96 MS
QT - MUSE: 378 MS
QTC - MUSE: 410 MS
R AXIS - MUSE: -5 DEGREES
SYSTOLIC BLOOD PRESSURE - MUSE: NORMAL MMHG
T AXIS - MUSE: 53 DEGREES
VENTRICULAR RATE- MUSE: 71 BPM

## 2022-05-19 NOTE — PROGRESS NOTES
05/18/22 1200   Quick Adds   Type of Visit Initial PT Evaluation   Living Environment   People in Home spouse   Current Living Arrangements house   Home Accessibility no concerns   Transportation Anticipated car, drives self   Self-Care   Usual Activity Tolerance good   Current Activity Tolerance moderate   Equipment Currently Used at Home   (no gait aid)   Fall history within last six months no   General Information   Referring Physician Kelby   Patient/Family Therapy Goals Statement (PT) return home   Existing Precautions/Restrictions fall  (s/p right RICKY)   Weight-Bearing Status - LLE full weight-bearing   Weight-Bearing Status - RLE weight-bearing as tolerated   Cognition   Affect/Mental Status (Cognition) WFL   Orientation Status (Cognition) person;place;situation   Memory Deficit (Cognition) minimal deficit   Pain Assessment   Patient Currently in Pain Yes, see Vital Sign flowsheet   Integumentary/Edema   Integumentary/Edema Comments surgical incision right hip region   Posture    Posture Not impaired   Range of Motion (ROM)   Range of Motion ROM is WFL   Strength (Manual Muscle Testing)   Strength (Manual Muscle Testing) strength is WFL   Strength Comments however, patient fatigues with activity   Bed Mobility   Comment, (Bed Mobility) minimal assist for right LE support   Transfers   Impairments Contributing to Impaired Transfers pain   Comment, (Transfers) CGA to SBA with Fww   Gait/Stairs (Locomotion)   [place] Level (Gait) supervision   Assistive Device (Gait) walker, front-wheeled   Distance in Feet (Required for LE Total Joints) 200   Pattern (Gait) 2-point   Maintains Weight-bearing Status (Gait) able to maintain   Balance   Balance Comments good with Fww   Sensory Examination   Sensory Perception WFL   Coordination   Coordination no deficits were identified   Muscle Tone   Muscle Tone no deficits were identified   Clinical Impression   Criteria for Skilled Therapeutic Intervention Evaluation  only   PT Diagnosis (PT) impaired mobility   Influenced by the following impairments fatigue and pain   Functional limitations due to impairments activity/gait tolerance   Clinical Presentation (PT Evaluation Complexity) Stable/Uncomplicated   Clinical Decision Making (Complexity) low complexity   Planned Therapy Interventions (PT) bed mobility training;gait training;strengthening;transfer training   Anticipated Equipment Needs at Discharge (PT) walker, rolling   Risk & Benefits of therapy have been explained risks/benefits reviewed;patient;spouse/significant other   PT Discharge Planning   PT Discharge Recommendation (DC Rec) home with assist;home with outpatient physical therapy   PT Rationale for DC Rec to promote strength, stability and safe mobility   PT Brief overview of current status minimal assist for bed mobilities for right LE support   Plan of Care Review   Plan of Care Reviewed With patient;spouse   Total Evaluation Time   Total Evaluation Time (Minutes) 15   Physical Therapy Goals   PT Frequency One time eval and treatment only

## 2022-05-19 NOTE — PLAN OF CARE
Goal Outcome Evaluation:            Physical Therapy Discharge Summary    Reason for therapy discharge:    Discharged to home with outpatient therapy.    Progress towards therapy goal(s). See goals on Care Plan in [facility] electronic health record for goal details.  patient demonstrating funtional mobility    Therapy recommendation(s):    Continued therapy is recommended.  Rationale/Recommendations:  to promote strength, stability and safe mobility.

## 2022-05-19 NOTE — PROGRESS NOTES
Clinic Care Coordination Contact  Patient has orthopedic surgery follow-up only.  No TCM call required per policy.  Denise Wooten RN ,....................  5/19/2022   8:36 AM

## 2022-05-20 ENCOUNTER — E-VISIT (OUTPATIENT)
Dept: FAMILY MEDICINE | Facility: OTHER | Age: 77
End: 2022-05-20

## 2022-05-20 ENCOUNTER — OFFICE VISIT (OUTPATIENT)
Dept: FAMILY MEDICINE | Facility: OTHER | Age: 77
End: 2022-05-20
Attending: STUDENT IN AN ORGANIZED HEALTH CARE EDUCATION/TRAINING PROGRAM
Payer: COMMERCIAL

## 2022-05-20 VITALS
BODY MASS INDEX: 29.6 KG/M2 | DIASTOLIC BLOOD PRESSURE: 84 MMHG | OXYGEN SATURATION: 93 % | WEIGHT: 189 LBS | HEART RATE: 78 BPM | SYSTOLIC BLOOD PRESSURE: 138 MMHG | RESPIRATION RATE: 18 BRPM | TEMPERATURE: 98.2 F

## 2022-05-20 DIAGNOSIS — E86.0 DEHYDRATION: Primary | ICD-10-CM

## 2022-05-20 DIAGNOSIS — G89.18 ACUTE POST-OPERATIVE PAIN: Primary | ICD-10-CM

## 2022-05-20 LAB
ANION GAP SERPL CALCULATED.3IONS-SCNC: 8 MMOL/L (ref 3–14)
BASOPHILS # BLD AUTO: 0 10E3/UL (ref 0–0.2)
BASOPHILS NFR BLD AUTO: 0 %
BUN SERPL-MCNC: 23 MG/DL (ref 7–25)
CALCIUM SERPL-MCNC: 9 MG/DL (ref 8.6–10.3)
CHLORIDE BLD-SCNC: 101 MMOL/L (ref 98–107)
CO2 SERPL-SCNC: 25 MMOL/L (ref 21–31)
CREAT SERPL-MCNC: 1.17 MG/DL (ref 0.7–1.3)
EOSINOPHIL # BLD AUTO: 0 10E3/UL (ref 0–0.7)
EOSINOPHIL NFR BLD AUTO: 0 %
ERYTHROCYTE [DISTWIDTH] IN BLOOD BY AUTOMATED COUNT: 12.3 % (ref 10–15)
GFR SERPL CREATININE-BSD FRML MDRD: 65 ML/MIN/1.73M2
GLUCOSE BLD-MCNC: 132 MG/DL (ref 70–105)
HCT VFR BLD AUTO: 33 % (ref 40–53)
HGB BLD-MCNC: 11.1 G/DL (ref 13.3–17.7)
IMM GRANULOCYTES # BLD: 0 10E3/UL
IMM GRANULOCYTES NFR BLD: 0 %
LYMPHOCYTES # BLD AUTO: 0.5 10E3/UL (ref 0.8–5.3)
LYMPHOCYTES NFR BLD AUTO: 6 %
MCH RBC QN AUTO: 31.5 PG (ref 26.5–33)
MCHC RBC AUTO-ENTMCNC: 33.6 G/DL (ref 31.5–36.5)
MCV RBC AUTO: 94 FL (ref 78–100)
MONOCYTES # BLD AUTO: 0.8 10E3/UL (ref 0–1.3)
MONOCYTES NFR BLD AUTO: 9 %
NEUTROPHILS # BLD AUTO: 8.1 10E3/UL (ref 1.6–8.3)
NEUTROPHILS NFR BLD AUTO: 85 %
NRBC # BLD AUTO: 0 10E3/UL
NRBC BLD AUTO-RTO: 0 /100
PLATELET # BLD AUTO: 174 10E3/UL (ref 150–450)
POTASSIUM BLD-SCNC: 4.5 MMOL/L (ref 3.5–5.1)
RBC # BLD AUTO: 3.52 10E6/UL (ref 4.4–5.9)
SODIUM SERPL-SCNC: 134 MMOL/L (ref 134–144)
WBC # BLD AUTO: 9.6 10E3/UL (ref 4–11)

## 2022-05-20 PROCEDURE — 82310 ASSAY OF CALCIUM: CPT | Mod: ZL | Performed by: STUDENT IN AN ORGANIZED HEALTH CARE EDUCATION/TRAINING PROGRAM

## 2022-05-20 PROCEDURE — 99213 OFFICE O/P EST LOW 20 MIN: CPT | Performed by: STUDENT IN AN ORGANIZED HEALTH CARE EDUCATION/TRAINING PROGRAM

## 2022-05-20 PROCEDURE — G0463 HOSPITAL OUTPT CLINIC VISIT: HCPCS

## 2022-05-20 PROCEDURE — 85025 COMPLETE CBC W/AUTO DIFF WBC: CPT | Mod: ZL | Performed by: STUDENT IN AN ORGANIZED HEALTH CARE EDUCATION/TRAINING PROGRAM

## 2022-05-20 PROCEDURE — 36415 COLL VENOUS BLD VENIPUNCTURE: CPT | Mod: ZL | Performed by: STUDENT IN AN ORGANIZED HEALTH CARE EDUCATION/TRAINING PROGRAM

## 2022-05-20 ASSESSMENT — PAIN SCALES - GENERAL: PAINLEVEL: MILD PAIN (3)

## 2022-05-20 NOTE — PROGRESS NOTES
Assessment & Plan     Dehydration  ED follow up for dehydration. Improved. Labs show improved creatinine. Hemoglobin stable. Advised to continue good PO intake with food and liquids. Follow up if symptoms return.   - CBC and Differential; Future  - Basic Metabolic Panel; Future  - CBC and Differential  - Basic Metabolic Panel    Bubba Long MD  [facility]    Damaso Guidry is a 76 year old who presents for the following health issues  accompanied by his spouse and son.  ER follow up.    History of Present Illness       Reason for visit:  Follow up for ER visit   He is taking medications regularly.     Seen two days ago for syncope  Cardiac work up negative  Was found to be dehydrated  Since discharge from the ED he has been doing well, has been drinking more fluids   Has not had syncopal episode since then   Does feel like his right leg is still more swollen since his right hip surgery 3 days ago           Review of Systems   Constitutional, HEENT, cardiovascular, pulmonary, gi and gu systems are negative, except as otherwise noted.      Objective    /84 (BP Location: Left arm, Patient Position: Sitting, Cuff Size: Adult Regular)   Pulse 78   Temp 98.2  F (36.8  C) (Tympanic)   Resp 18   Wt 85.7 kg (189 lb)   SpO2 93%   BMI 29.60 kg/m    Body mass index is 29.6 kg/m .  Physical Exam   GENERAL: healthy, alert and no distress  RESP: lungs clear to auscultation - no rales, rhonchi or wheezes  CV: regular rate and rhythm, normal S1 S2, no S3 or S4, no murmur, click or rub, no peripheral edema and peripheral pulses strong  MS: right thigh more swollen compared to left but soft and non tender. No calf tenderness bilaterally  NEURO: poor memory     Results for orders placed or performed in visit on 05/20/22 (from the past 24 hour(s))   CBC and Differential    Narrative    The following orders were created for panel order CBC and Differential.  Procedure                                Abnormality         Status                     ---------                               -----------         ------                     CBC with platelets and d...[[id number]]  Abnormal            Final result                 Please view results for these tests on the individual orders.   Basic Metabolic Panel   Result Value Ref Range    Sodium 134 134 - 144 mmol/L    Potassium 4.5 3.5 - 5.1 mmol/L    Chloride 101 98 - 107 mmol/L    Carbon Dioxide (CO2) 25 21 - 31 mmol/L    Anion Gap 8 3 - 14 mmol/L    Urea Nitrogen 23 7 - 25 mg/dL    Creatinine 1.17 0.70 - 1.30 mg/dL    Calcium 9.0 8.6 - 10.3 mg/dL    Glucose 132 (H) 70 - 105 mg/dL    GFR Estimate 65 >60 mL/min/1.73m2   CBC with platelets and differential   Result Value Ref Range    WBC Count 9.6 4.0 - 11.0 10e3/uL    RBC Count 3.52 (L) 4.40 - 5.90 10e6/uL    Hemoglobin 11.1 (L) 13.3 - 17.7 g/dL    Hematocrit 33.0 (L) 40.0 - 53.0 %    MCV 94 78 - 100 fL    MCH 31.5 26.5 - 33.0 pg    MCHC 33.6 31.5 - 36.5 g/dL    RDW 12.3 10.0 - 15.0 %    Platelet Count 174 150 - 450 10e3/uL    % Neutrophils 85 %    % Lymphocytes 6 %    % Monocytes 9 %    % Eosinophils 0 %    % Basophils 0 %    % Immature Granulocytes 0 %    NRBCs per 100 WBC 0 <1 /100    Absolute Neutrophils 8.1 1.6 - 8.3 10e3/uL    Absolute Lymphocytes 0.5 (L) 0.8 - 5.3 10e3/uL    Absolute Monocytes 0.8 0.0 - 1.3 10e3/uL    Absolute Eosinophils 0.0 0.0 - 0.7 10e3/uL    Absolute Basophils 0.0 0.0 - 0.2 10e3/uL    Absolute Immature Granulocytes 0.0 <=0.4 10e3/uL    Absolute NRBCs 0.0 10e3/uL

## 2022-05-20 NOTE — NURSING NOTE
Patient presents to clinic for ER follow up after visit on 05/17/22 for syncope and dehydration.    Medication Rec Complete  Martine Laurent LPN............5/20/2022 1:33 PM

## 2022-05-21 ENCOUNTER — HOSPITAL ENCOUNTER (EMERGENCY)
Facility: OTHER | Age: 77
Discharge: LEFT BEFORE DISCHARGE | End: 2022-05-21
Attending: PHYSICIAN ASSISTANT
Payer: COMMERCIAL

## 2022-05-21 VITALS
WEIGHT: 189 LBS | DIASTOLIC BLOOD PRESSURE: 59 MMHG | OXYGEN SATURATION: 97 % | HEART RATE: 84 BPM | BODY MASS INDEX: 29.6 KG/M2 | TEMPERATURE: 97 F | RESPIRATION RATE: 18 BRPM | SYSTOLIC BLOOD PRESSURE: 136 MMHG

## 2022-05-21 DIAGNOSIS — Z53.29 LEFT AGAINST MEDICAL ADVICE: ICD-10-CM

## 2022-05-21 NOTE — ED TRIAGE NOTES
Patient arrives today with spouse via private vehicle with concerns of running out of pain medication as ot had right hip surgery Tuesday of this week. Pt to take 1-2 tabs every three hours and only has 2 pills remaining for weekend. Pt denies pain at this time      Triage Assessment     Row Name 05/21/22 9261       Triage Assessment (Adult)    Airway WDL WDL       Respiratory WDL    Respiratory WDL WDL       Skin Circulation/Temperature WDL    Skin Circulation/Temperature WDL WDL       Cardiac WDL    Cardiac WDL WDL       Peripheral/Neurovascular WDL    Peripheral Neurovascular WDL WDL       Cognitive/Neuro/Behavioral WDL    Cognitive/Neuro/Behavioral WDL X;orientation    Level of Consciousness confused    Arousal Level opens eyes spontaneously    Orientation disoriented to;situation;time

## 2022-05-21 NOTE — ED NOTES
This person was roomed about the same time as a tier 1 stroke and a partial trauma arrived and were  this patient left before being seen.     Alfonso Mitchell PA-C  05/21/22 1855       Alfonso Mitchell PA-C  05/21/22 1857

## 2022-05-21 NOTE — ED NOTES
Pt's wife put call light on and asked how long the doctor will be. Pt and wife educated that patient's are seen by whoever is the sickest and that the doctor is busy with critical patients right now. Pt's wife stated that this is ridiculous and she just wants pain medications. Pt's wife requested that nurse leave a note for the doctor to send a prescription in for pain medications, wife educated that we are not able to do this with narcotics and that patient will need to wait and be seen. Pt's wife stated that she has been giving patient two pain pills every three hours and that they should have given her more medication to last the weekend. Pt and wife assisted to vehicle, while helping patient into vehicle, pt remained independent and did not appear to be in pain. Pt remained calm and cooperative throughout encounter.

## 2022-05-22 ENCOUNTER — MYC MEDICAL ADVICE (OUTPATIENT)
Dept: FAMILY MEDICINE | Facility: OTHER | Age: 77
End: 2022-05-22
Payer: COMMERCIAL

## 2022-05-23 ENCOUNTER — HOSPITAL ENCOUNTER (OUTPATIENT)
Dept: ULTRASOUND IMAGING | Facility: OTHER | Age: 77
Discharge: HOME | End: 2022-05-23
Attending: FAMILY MEDICINE
Payer: MEDICARE

## 2022-05-23 ENCOUNTER — OFFICE VISIT (OUTPATIENT)
Dept: FAMILY MEDICINE | Facility: OTHER | Age: 77
End: 2022-05-23
Attending: FAMILY MEDICINE
Payer: MEDICARE

## 2022-05-23 VITALS
OXYGEN SATURATION: 98 % | RESPIRATION RATE: 18 BRPM | TEMPERATURE: 97.9 F | SYSTOLIC BLOOD PRESSURE: 128 MMHG | DIASTOLIC BLOOD PRESSURE: 74 MMHG | HEART RATE: 75 BPM

## 2022-05-23 DIAGNOSIS — M25.551 RIGHT HIP PAIN: ICD-10-CM

## 2022-05-23 DIAGNOSIS — G89.18 POSTOPERATIVE PAIN: ICD-10-CM

## 2022-05-23 DIAGNOSIS — M79.89 RIGHT LEG SWELLING: Primary | ICD-10-CM

## 2022-05-23 DIAGNOSIS — M79.89 RIGHT LEG SWELLING: ICD-10-CM

## 2022-05-23 PROCEDURE — 93971 EXTREMITY STUDY: CPT | Mod: RT

## 2022-05-23 PROCEDURE — G0463 HOSPITAL OUTPT CLINIC VISIT: HCPCS

## 2022-05-23 PROCEDURE — 99213 OFFICE O/P EST LOW 20 MIN: CPT | Performed by: FAMILY MEDICINE

## 2022-05-23 PROCEDURE — G0463 HOSPITAL OUTPT CLINIC VISIT: HCPCS | Mod: 25

## 2022-05-23 RX ORDER — OXYCODONE HYDROCHLORIDE 5 MG/1
5-10 TABLET ORAL
Qty: 30 TABLET | Refills: 0 | Status: SHIPPED | OUTPATIENT
Start: 2022-05-23 | End: 2022-05-26

## 2022-05-23 ASSESSMENT — ANXIETY QUESTIONNAIRES
4. TROUBLE RELAXING: NEARLY EVERY DAY
GAD7 TOTAL SCORE: 14
8. IF YOU CHECKED OFF ANY PROBLEMS, HOW DIFFICULT HAVE THESE MADE IT FOR YOU TO DO YOUR WORK, TAKE CARE OF THINGS AT HOME, OR GET ALONG WITH OTHER PEOPLE?: SOMEWHAT DIFFICULT
7. FEELING AFRAID AS IF SOMETHING AWFUL MIGHT HAPPEN: NOT AT ALL
7. FEELING AFRAID AS IF SOMETHING AWFUL MIGHT HAPPEN: NOT AT ALL
3. WORRYING TOO MUCH ABOUT DIFFERENT THINGS: SEVERAL DAYS
GAD7 TOTAL SCORE: 14
1. FEELING NERVOUS, ANXIOUS, OR ON EDGE: NEARLY EVERY DAY
5. BEING SO RESTLESS THAT IT IS HARD TO SIT STILL: NEARLY EVERY DAY
2. NOT BEING ABLE TO STOP OR CONTROL WORRYING: SEVERAL DAYS
6. BECOMING EASILY ANNOYED OR IRRITABLE: NEARLY EVERY DAY
GAD7 TOTAL SCORE: 14

## 2022-05-23 ASSESSMENT — ENCOUNTER SYMPTOMS: HIP PAIN: 1

## 2022-05-23 ASSESSMENT — PATIENT HEALTH QUESTIONNAIRE - PHQ9
SUM OF ALL RESPONSES TO PHQ QUESTIONS 1-9: 15
10. IF YOU CHECKED OFF ANY PROBLEMS, HOW DIFFICULT HAVE THESE PROBLEMS MADE IT FOR YOU TO DO YOUR WORK, TAKE CARE OF THINGS AT HOME, OR GET ALONG WITH OTHER PEOPLE: SOMEWHAT DIFFICULT
SUM OF ALL RESPONSES TO PHQ QUESTIONS 1-9: 15

## 2022-05-23 ASSESSMENT — PAIN SCALES - GENERAL: PAINLEVEL: SEVERE PAIN (7)

## 2022-05-23 NOTE — NURSING NOTE
"Chief Complaint   Patient presents with     Hip Pain     Right      Patient is here for right hip pain. He had surgery on right hip on 5/17/2022. Went to ER on 5/21/2022 but left against medical advise.     Initial /74   Pulse 75   Temp 97.9  F (36.6  C) (Tympanic)   Resp 18   SpO2 98%  Estimated body mass index is 29.6 kg/m  as calculated from the following:    Height as of 5/18/22: 1.702 m (5' 7\").    Weight as of 5/21/22: 85.7 kg (189 lb).  Medication Reconciliation: complete    Di Hong MA       FOOD SECURITY SCREENING QUESTIONS:    The next two questions are to help us understand your food security.  If you are feeling you need any assistance in this area, we have resources available to support you today.    Hunger Vital Signs:  Within the past 12 months we worried whether our food would run out before we got money to buy more. Never  Within the past 12 months the food we bought just didn't last and we didn't have money to get more. Never  Di Hong MA,LPN on 5/23/2022 at 2:50 PM      "

## 2022-05-23 NOTE — PROGRESS NOTES
Chao was seen today for hip pain.    Diagnoses and all orders for this visit:    Right leg swelling  -     US Lower Extremity Venous Duplex Right; Future    Right hip pain  -     oxyCODONE (ROXICODONE) 5 MG tablet; Take 1-2 tablets (5-10 mg) by mouth every 3 hours as needed for pain (Moderate to Severe)    Postoperative pain  -     oxyCODONE (ROXICODONE) 5 MG tablet; Take 1-2 tablets (5-10 mg) by mouth every 3 hours as needed for pain (Moderate to Severe)    No evidence of DVT.  Continue with aspirin.  Discussed the importance of leg elevation and compression.  Discussed the importance of drinking plenty of fluids.  He will try and lie down with his legs above his heart on some couch cushions on the bed for an hour each half day.  Continue with physical therapy and follow-up with orthopedics as scheduled.  Renewed 30 tablets of oxycodone.      Answers for HPI/ROS submitted by the patient on 5/23/2022  If you checked off any problems, how difficult have these problems made it for you to do your work, take care of things at home, or get along with other people?: Somewhat difficult  PHQ9 TOTAL SCORE: 15  EDDIE 7 TOTAL SCORE: 14      Subjective   Chao is a 76 year old who presents for the following health issues     Hip Pain  This is a chronic problem. The current episode started more than 1 year ago. The problem occurs constantly. The problem has been gradually improving. The symptoms are aggravated by standing and walking. He has tried acetaminophen and ice for the symptoms. The treatment provided mild relief.      He has right leg swelling. No chest pain/sob. Had to sleep in a recliner the first couple of days.  He took his pain medication fairly regularly in the first few days and then ran out and has been struggling since then.  Apparently they were not given any contact information for [facility] and they really did not get much satisfaction as far as being directly connected but ended up coming in  today.  He is due to start physical therapy today.      Review of Systems   ROS is negative except as noted above         Objective    /74   Pulse 75   Temp 97.9  F (36.6  C) (Tympanic)   Resp 18   SpO2 98%   There is no height or weight on file to calculate BMI.  Physical Exam   Alert cooperative, accompanied by his wife, no distress.  Dressing is clean dry and intact.  Staples are still in place.  He has bruising and swelling of his right leg from his thigh all the way to his foot.  No palpable cords or calf tenderness.  Lungs are clear, no rales rhonchi or wheezes are heard.  Cardiac RRR without murmur    Results for orders placed or performed during the hospital encounter of 05/23/22   US Lower Extremity Venous Duplex Right     Status: None    Narrative    Exam: US LOWER EXTREMITY VENOUS DUPLEX RIGHT    Exam reason: Right leg swelling    Comparison: None.    TECHNIQUE: Deep veins were evaluated using B-mode, color flow Doppler  and pulse wave spectral Doppler.      FINDINGS:    The common femoral, saphenofemoral junction, femoral and popliteal  veins are patent. There is no evidence of venous thrombus. These  vessels exhibited normal compressibility.    Flow is present in the posterior tibial and peroneal veins. These  vessels are compressible.       Impression    IMPRESSION:    No acute deep venous thrombosis.           EFRAÍN JUAREZ MD         SYSTEM ID:  [id number]

## 2022-05-24 ENCOUNTER — HOSPITAL ENCOUNTER (OUTPATIENT)
Dept: PHYSICAL THERAPY | Facility: OTHER | Age: 77
Setting detail: THERAPIES SERIES
Discharge: HOME | End: 2022-05-24
Attending: FAMILY MEDICINE
Payer: MEDICARE

## 2022-05-24 DIAGNOSIS — Z96.641 S/P TOTAL RIGHT HIP ARTHROPLASTY: ICD-10-CM

## 2022-05-24 PROCEDURE — 97110 THERAPEUTIC EXERCISES: CPT | Mod: GP

## 2022-05-24 PROCEDURE — 97161 PT EVAL LOW COMPLEX 20 MIN: CPT | Mod: GP

## 2022-05-24 PROCEDURE — 97016 VASOPNEUMATIC DEVICE THERAPY: CPT | Mod: GP

## 2022-05-25 NOTE — TELEPHONE ENCOUNTER
Provider E-Visit time total (minutes): <5 minutes  Patient already had appointment with another physician for refills

## 2022-05-25 NOTE — PROGRESS NOTES
05/24/22 1300   General Information   Type of Visit Initial OP Ortho PT Evaluation   Start of Care Date 05/24/22   Referring Physician Dr. Cook   Patient/Family Goals Statement pt would like to return to normal walking   Orders Evaluate and Treat   Orders Comment 2-3x/week x 4   Date of Order 05/19/22   Certification Required? Yes   Medical Diagnosis R total hip replacement   Surgical/Medical history reviewed Yes   Precautions/Limitations no known precautions/limitations   General Information Comments Pt is a 76 year old male referred to skilled PT services following a R RICKY on 5/17. Pt has had R hip pain impacting his function for months and was unable to walk outdoors by the time of his surgery. Pt also had a shorter R leg that was mostly corrected with the R RICKY. Pt has had a rough recovery per wife (wife along for eval due to very poor pt short term memory) and has been to the ER 2x since his discharge, once for dehydration and low BP and another time for pain medication due to poor pain control since return to home. Pt was able to see primary MD yesterday but per wife due to insurance issue pt is still unable to get pain medication. Pt presents with increased R LE swelling, poor ROM in hips at baseline, and weakness impacting his ability to walk and complete stairs normally.   Body Part(s)   Body Part(s) Hip   Presentation and Etiology   Pertinent history of current problem (include personal factors and/or comorbidities that impact the POC) PMH: L hip RICKY (years ago), R RICKY, HTN, arthritis   Impairments A. Pain;B. Decreased WB tolerance;C. Swelling;D. Decreased ROM;E. Decreased flexibility;F. Decreased strength and endurance;G. Impaired balance;H. Impaired gait   Functional Limitations perform activities of daily living;perform desired leisure / sports activities   Symptom Location R hip   How/Where did it occur From Degenerative Joint Disease   Onset date of current episode/exacerbation 05/17/22    Chronicity New   Pain rating (0-10 point scale) Best (/10);Worst (/10)   Best (/10) 3/10   Worst (/10) 10/10   Pain quality A. Sharp   Frequency of pain/symptoms B. Intermittent   Pain/symptoms are: The same all the time   Pain/symptoms exacerbated by A. Sitting;G. Certain positions;I. Bending   Pain/symptoms eased by F. Certain positions;H. Cold;I. OTC medication(s)   Progression of symptoms since onset: Improved   Current / Previous Interventions   Diagnostic Tests: X-ray   X-ray Results unremarkable   X-ray results components well placed   Prior Level of Function   Prior Level of Function-Mobility independent   Prior Level of Function-ADLs independent   Functional Level Prior Comment pt lives with wife in home, is active with hunting and fishing but does have poor memory   Current Level of Function   Current Community Support Family/friend caregiver   Patient role/employment history F. Retired   Living environment House/[facility]   Home/community accessibility pt has stairs in home but does not need to use them, has hot tub and wants to know when he can use it. Pt hunts and fishes and likes to walk in the woods   Current equipment-Gait/Locomotion None   Current equipment-ADL None   Fall Risk Screen   Fall screen completed by PT   Have you fallen 2 or more times in the past year? No   Have you fallen and had an injury in the past year? No   Is patient a fall risk? Yes;Department fall risk interventions implemented   Fall screen comments Pt is at risk for falls due to hip replacement   Abuse Screen (yes response referral indicated)   Feels Unsafe at Home or Work/School no   Feels Threatened by Someone no   Does Anyone Try to Keep You From Having Contact with Others or Doing Things Outside Your Home? no   Physical Signs of Abuse Present no   Patient needs abuse support services and resources No   Hip Objective Findings   Side (if bilateral, select both right and left) Right   Observation increased swelling on the R  LE   Gait/Locomotion increased lateral sway, heavy forward lean on FWW and step to pattern B with decreased stance time on the L.   Right Hip Flexion PROM 80 deg   Hip ROM Comments pt very stiff and painful today, unable to tolerate ROM assessment, very stiff and limited in ROM at baseline   Right Hip Flexion Strength 3-/5   Right Hip Abduction Strength 3-/5   Right Hip Extension Strength 3-/5   Right Knee Flexion Strength 3/5   Right Knee Extension Strength 3/5   Hip/Knee Strength Comments L hip grossly 4+/5   Palpation increased R hip swelling down to foot, increased tissue tension in R anterior hip, quad, and IT band.   Accessory Motion/Joint Mobility not assessed due to pain today.   Planned Therapy Interventions   Planned Therapy Interventions balance training;bed mobility training;gait training;joint mobilization;manual therapy;motor coordination training;neuromuscular re-education;ROM;strengthening;stretching;transfer training   Planned Modality Interventions   Planned Modality Interventions Cryotherapy;Electrical stimulation;Hot packs;Ultrasound;TENS   Planned Modality Interventions Comments vasopneumatic compression   Clinical Impression   Criteria for Skilled Therapeutic Interventions Met yes, treatment indicated   PT Diagnosis R hip weakness, gait instability s/p R RICKY   Influenced by the following impairments weakness, swelling, limited tissue and joint motion, poor motor control, poor memory   Functional limitations due to impairments walking on unstable surfaces, stairs, gait    Clinical Presentation Stable/Uncomplicated   Clinical Presentation Rationale symptoms are consistent with s/p joint replacement   Clinical Decision Making (Complexity) Low complexity   Therapy Frequency 2 times/Week   Predicted Duration of Therapy Intervention (days/wks) 8 weeks   Risk & Benefits of therapy have been explained Yes   Patient, Family & other staff in agreement with plan of care Yes   Clinical Impression Comments  Pt is a 76 year old male referred to skilled PT services following a R RICKY on 5/17 with a difficult recovery. Pt has been to the ER 2 times since surgery due to poor pain management and passing out due to dehydration and how BP. Pt is doing better at this time but presents with high levels of pain, swelling, and decrease motor control impacting his gait, stair ability, and transfers. Pt can benefit from skilled PT services to address these deficts and progress to fishing and hunting again.   Education Assessment   Preferred Learning Style Reading   Barriers to Learning Cognitive   Ortho Goal 1   Goal Identifier pain   Goal Description pt will report a 3/10 pain or less in the R hip during bed mobility and gait in order to progress towards independence with gait and transfers.   Target Date 06/22/22   Ortho Goal 2   Goal Identifier strength   Goal Description Pt will demonstrate ability to lift his R hip in a SLR x 10 reps without compensation to height of opposite knee in order to increase functional strength for gait mechanics.   Target Date 07/06/22   Ortho Goal 3   Goal Identifier gait   Goal Description pt will ambulate for 200 feet on even surfaces without an assistive device with step through pattern and B foot clearance to progress to normalized walking pattern.   Target Date 07/20/22   Ortho Goal 4   Goal Identifier Balance   Goal Description Pt will demonstrate ability to complete B tandem stance for 30 sec without UE assist in order to progress static stablity for walking on uneven ground in the woods.    Target Date 06/22/22   Total Evaluation Time   PT Eval, Low Complexity Minutes (50317) 15   Therapy Certification   Certification date from 05/24/22   Certification date to 07/19/22   Medical Diagnosis s/p R RICKY

## 2022-05-25 NOTE — PROGRESS NOTES
[facility]    OUTPATIENT PHYSICAL THERAPY ORTHOPEDIC EVALUATION  PLAN OF TREATMENT FOR OUTPATIENT REHABILITATION  (COMPLETE FOR INITIAL CLAIMS ONLY)  Patient's Last Name, First Name, M.I.  YOB: 1945  Chao Kearney    Provider s Name:  [facility]   Medical Record No.  [id number]   Start of Care Date:  05/24/22   Onset Date:  05/17/22   Type:     _X__PT   ___OT   ___SLP Medical Diagnosis:  s/p R RICKY     PT Diagnosis:  R hip weakness, gait instability s/p R RICKY   Visits from SOC:  1      _________________________________________________________________________________  Plan of Treatment/Functional Goals:  balance training, bed mobility training, gait training, joint mobilization, manual therapy, motor coordination training, neuromuscular re-education, ROM, strengthening, stretching, transfer training     Cryotherapy, Electrical stimulation, Hot packs, Ultrasound, TENS  vasopneumatic compression    Goals  Goal Identifier: pain  Goal Description: pt will report a 3/10 pain or less in the R hip during bed mobility and gait in order to progress towards independence with gait and transfers.  Target Date: 06/22/22    Goal Identifier: strength  Goal Description: Pt will demonstrate ability to lift his R hip in a SLR x 10 reps without compensation to height of opposite knee in order to increase functional strength for gait mechanics.  Target Date: 07/06/22    Goal Identifier: gait  Goal Description: pt will ambulate for 200 feet on even surfaces without an assistive device with step through pattern and B foot clearance to progress to normalized walking pattern.  Target Date: 07/20/22    Goal Identifier: Balance  Goal Description: Pt will demonstrate ability to complete B tandem stance for 30 sec without UE assist in order to progress static stablity for walking on uneven  ground in the woods.   Target Date: 06/22/22         Therapy Frequency:  2 times/Week  Predicted Duration of Therapy Intervention:  8 weeks (increased time for plan due to poor pt memory and potential for variable compliance at home)    Fidelina Iyer, PT                 I CERTIFY THE NEED FOR THESE SERVICES FURNISHED UNDER        THIS PLAN OF TREATMENT AND WHILE UNDER MY CARE     (Physician co-signature of this document indicates review and certification of the therapy plan).                     Certification Date From:  05/24/22   Certification Date To:  07/19/22    Referring Provider:  Dr. Cook    Initial Assessment        See Epic Evaluation Start of Care Date: 05/24/22

## 2022-05-26 ENCOUNTER — OFFICE VISIT (OUTPATIENT)
Dept: FAMILY MEDICINE | Facility: OTHER | Age: 77
End: 2022-05-26
Attending: FAMILY MEDICINE
Payer: COMMERCIAL

## 2022-05-26 VITALS
HEART RATE: 70 BPM | TEMPERATURE: 97.6 F | SYSTOLIC BLOOD PRESSURE: 130 MMHG | RESPIRATION RATE: 16 BRPM | DIASTOLIC BLOOD PRESSURE: 70 MMHG | OXYGEN SATURATION: 99 %

## 2022-05-26 DIAGNOSIS — G89.18 POSTOPERATIVE PAIN: Primary | ICD-10-CM

## 2022-05-26 DIAGNOSIS — M25.551 RIGHT HIP PAIN: ICD-10-CM

## 2022-05-26 DIAGNOSIS — K59.03 DRUG-INDUCED CONSTIPATION: ICD-10-CM

## 2022-05-26 PROCEDURE — G0463 HOSPITAL OUTPT CLINIC VISIT: HCPCS

## 2022-05-26 PROCEDURE — 99213 OFFICE O/P EST LOW 20 MIN: CPT | Performed by: FAMILY MEDICINE

## 2022-05-26 RX ORDER — OXYCODONE HYDROCHLORIDE 5 MG/1
5-10 TABLET ORAL
Qty: 30 TABLET | Refills: 0 | Status: SHIPPED | OUTPATIENT
Start: 2022-05-26 | End: 2022-10-25

## 2022-05-26 ASSESSMENT — PAIN SCALES - GENERAL: PAINLEVEL: MILD PAIN (2)

## 2022-05-26 NOTE — PROGRESS NOTES
Nursing Notes:   Yanira Cotakennedy REBOLLAR, LPN  5/26/2022 10:28 AM  Sign at exiting of workspace  Chief Complaint   Patient presents with     RECHECK     Here today with wife for concerns about his right leg. Was seen by PCP a few days ago. Still has complaints of pain, worse in evenings/nights.     Medication Reconciliation: complete    Di KETURAH. NADER Cota        Subjective   Chao is a 76 year old who presents for the following health issues:    Chao has been having issues with pain in his right hip.  It radiates all the way down to his knee.  It is waking him up at night.  No falls.  The bandage seem so be getting loose, but uncertain if there is any drainage or redness of the wound.  He doesn't have an appointment to see Dr. Lama until 6/1/22 for follow up.  He just had a refill of his pain medications, but uncertain if it will be enough to get him through until he is seen by Dr. Lama.  They are using medications prior to sleep overnight.  They are using tylenol between doses of the pain medication.  He had a right total hip arthroplasty on 5/17/22 with Dr. Lama.  He had seen Chan Cook MD, his primary care provider, on 5/23/22.  According to that note, his right hip pain symptoms started more than a year ago, but is aggravated by standing and walking.  He also has had associated right leg swelling.  He denied any shortness of breath or chest pain.  He has previously been referred to Physical Therapy.  He had a RLE venous ultrasound on 5/23/22, which did not show a dvt.  It was recommended that he continue with aspirin, elevate the leg and use compression.  Oxycodone was refilled.  He has not had a fever or chills.    Only has had one bowel movement since his surgery. Feels very bloated.  Using senna 1 tablet twice daily.      HPI     Right knee/knee pain       Review of Systems   Constitutional: Negative for chills and fever.   Respiratory: Negative for cough and shortness of breath.    Musculoskeletal:  Positive for arthralgias.   Skin: Positive for wound. Negative for color change.   Psychiatric/Behavioral: Negative for mood changes. The patient is not nervous/anxious.             Objective    /70 (BP Location: Right arm, Patient Position: Sitting, Cuff Size: Adult Regular)   Pulse 70   Temp 97.6  F (36.4  C) (Tympanic)   Resp 16   SpO2 99%   There is no height or weight on file to calculate BMI.  Physical Exam  Constitutional:       Appearance: Normal appearance.   HENT:      Head: Normocephalic.   Eyes:      Pupils: Pupils are equal, round, and reactive to light.   Cardiovascular:      Rate and Rhythm: Normal rate and regular rhythm.      Heart sounds: Normal heart sounds. No murmur heard.  Pulmonary:      Effort: Pulmonary effort is normal.      Breath sounds: Normal breath sounds. No wheezing, rhonchi or rales.   Musculoskeletal:      Comments: He is able to stand and bear weight.  Right hip wound is covered with a dressing,  The small portion of the inferior part of the wound I am able to see does not have any redness.  There is a tiny amount of dried blood on the dressing, but no other drainage noted.   Neurological:      Mental Status: He is alert.          Assessment & Plan       ICD-10-CM    1. Postoperative pain  G89.18 oxyCODONE (ROXICODONE) 5 MG tablet   2. Right hip pain  M25.551 oxyCODONE (ROXICODONE) 5 MG tablet   3. Drug-induced constipation  K59.03      1.  I think that the pain he is experiencing is just normal post-operative pain.  He had an ultrasound a few days ago, which did not show DVT.  He has not had a fever to suggest infection and the wound is not showing any concerning signs.   was reviewed and he was given #30 oxycodone tablets to get him through until his appointment with Orthopedics on 6/1/22.  If he is doing worse in the meantime, he should be seen sooner.  2.  See #1.  3.  Recommendations as noted below.  Constipation likely side effect of the oxycodone.  I would  recommend increasing the senna to 2 tablets twice daily.  If this is not producing a bowel movement, I would also recommend adding miralax 1 capful to 8 oz of liquid once a day.  If this is till not working, I would recommend using magnesium citrate or a fleets enema.        Stacey Hurley MD  [facility]

## 2022-05-26 NOTE — PATIENT INSTRUCTIONS
I would recommend increasing the senna to 2 tablets twice daily.  If this is not producing a bowel movement, I would also recommend adding miralax 1 capful to 8 oz of liquid once a day.  If this is till not working, I would recommend using magnesium citrate or a fleets enema.

## 2022-05-26 NOTE — NURSING NOTE
Chief Complaint   Patient presents with     RECHECK     Here today with wife for concerns about his right leg. Was seen by PCP a few days ago. Still has complaints of pain, worse in evenings/nights.     Medication Reconciliation: complete    Di Cota LPN

## 2022-05-27 ENCOUNTER — HOSPITAL ENCOUNTER (OUTPATIENT)
Dept: PHYSICAL THERAPY | Facility: OTHER | Age: 77
Setting detail: THERAPIES SERIES
Discharge: HOME | End: 2022-05-27
Attending: FAMILY MEDICINE
Payer: MEDICARE

## 2022-05-27 PROCEDURE — 97110 THERAPEUTIC EXERCISES: CPT | Mod: GP

## 2022-05-27 PROCEDURE — 97016 VASOPNEUMATIC DEVICE THERAPY: CPT | Mod: GP

## 2022-05-27 ASSESSMENT — ENCOUNTER SYMPTOMS
FEVER: 0
ARTHRALGIAS: 1
CHILLS: 0
COUGH: 0
NERVOUS/ANXIOUS: 0
COLOR CHANGE: 0
WOUND: 1
SHORTNESS OF BREATH: 0

## 2022-05-31 ENCOUNTER — HOSPITAL ENCOUNTER (EMERGENCY)
Facility: OTHER | Age: 77
Discharge: HOME | End: 2022-06-01
Attending: PHYSICIAN ASSISTANT | Admitting: PHYSICIAN ASSISTANT
Payer: MEDICARE

## 2022-05-31 ENCOUNTER — APPOINTMENT (OUTPATIENT)
Dept: GENERAL RADIOLOGY | Facility: OTHER | Age: 77
End: 2022-05-31
Attending: PHYSICIAN ASSISTANT
Payer: MEDICARE

## 2022-05-31 VITALS
TEMPERATURE: 97.3 F | WEIGHT: 195 LBS | DIASTOLIC BLOOD PRESSURE: 60 MMHG | HEART RATE: 83 BPM | OXYGEN SATURATION: 98 % | BODY MASS INDEX: 29.55 KG/M2 | SYSTOLIC BLOOD PRESSURE: 130 MMHG | HEIGHT: 68 IN

## 2022-05-31 DIAGNOSIS — K59.01 SLOW TRANSIT CONSTIPATION: ICD-10-CM

## 2022-05-31 DIAGNOSIS — R33.9 RETENTION OF URINE: ICD-10-CM

## 2022-05-31 DIAGNOSIS — Z96.641 S/P TOTAL RIGHT HIP ARTHROPLASTY: ICD-10-CM

## 2022-05-31 DIAGNOSIS — Z87.820 HX OF TRAUMATIC BRAIN INJURY: ICD-10-CM

## 2022-05-31 PROCEDURE — 99283 EMERGENCY DEPT VISIT LOW MDM: CPT | Performed by: PHYSICIAN ASSISTANT

## 2022-05-31 PROCEDURE — 99284 EMERGENCY DEPT VISIT MOD MDM: CPT | Performed by: PHYSICIAN ASSISTANT

## 2022-05-31 PROCEDURE — 250N000013 HC RX MED GY IP 250 OP 250 PS 637: Performed by: PHYSICIAN ASSISTANT

## 2022-05-31 PROCEDURE — 74019 RADEX ABDOMEN 2 VIEWS: CPT

## 2022-05-31 RX ORDER — DOCUSATE SODIUM 100 MG/1
200 CAPSULE, LIQUID FILLED ORAL ONCE
Status: COMPLETED | OUTPATIENT
Start: 2022-05-31 | End: 2022-05-31

## 2022-05-31 RX ORDER — MAGNESIUM CARB/ALUMINUM HYDROX 105-160MG
296 TABLET,CHEWABLE ORAL ONCE
Status: COMPLETED | OUTPATIENT
Start: 2022-05-31 | End: 2022-05-31

## 2022-05-31 RX ADMIN — MAGNESIUM CITRATE 296 ML: 1.75 LIQUID ORAL at 21:46

## 2022-05-31 RX ADMIN — MAGNESIUM CITRATE 286 ML: 1.75 LIQUID ORAL at 20:09

## 2022-05-31 RX ADMIN — DOCUSATE SODIUM 200 MG: 100 CAPSULE, LIQUID FILLED ORAL at 20:09

## 2022-05-31 ASSESSMENT — ENCOUNTER SYMPTOMS
TREMORS: 0
BACK PAIN: 0
CONFUSION: 1
VOMITING: 0
CONSTIPATION: 1
STRIDOR: 0
NAUSEA: 0
FLANK PAIN: 0
AGITATION: 0
FEVER: 0
FACIAL ASYMMETRY: 0
FACIAL SWELLING: 0
SEIZURES: 0
ABDOMINAL PAIN: 0
EYE PAIN: 0

## 2022-06-01 NOTE — ED PROVIDER NOTES
History     Chief Complaint   Patient presents with     Constipation     HPI  Chao Kearney is a 76 year old male who underwent a right total hip on 5/17/2022 with .  According to his spouse since that surgery he has only had 2 bowel movements since then.  The patient was using Colace twice daily and today was started on MiraLAX.  He is currently leaking a small amount of stool.  He is uncomfortable.  Patient spouse is worried about a bowel obstruction.  The patient was seen by his primary on 5/3/2022 as well as another family practice doctor on 5/26/2022 due to pain management.  Patient apparently has a history of a TBI and he often does not remember where he has been or what he is done according to his spouse.      Allergies:  Allergies   Allergen Reactions     Fluress Unknown       Problem List:    Patient Active Problem List    Diagnosis Date Noted     Generalized anxiety disorder 01/17/2018     Priority: Medium     Elevated prostate specific antigen (PSA) 01/17/2018     Priority: Medium     Overview:   in October 2005.  The patient is taking Levaquin daily for two months.  He   will have another PSA checked in October 2006, recommended by Dr. Wilson.       Hyperlipidemia 01/17/2018     Priority: Medium     Hypertension 01/17/2018     Priority: Medium     Plantar fasciitis 01/17/2018     Priority: Medium     Overview:   right       Benign non-nodular prostatic hyperplasia with lower urinary tract symptoms 07/27/2016     Priority: Medium     Brain injury (H) 01/16/2015     Priority: Medium     Retention of urine 12/12/2014     Priority: Medium     Right hip pain 10/10/2014     Priority: Medium     Anemia 09/22/2014     Priority: Medium     Hydrocephalus (H) 09/22/2014     Priority: Medium     SAH (subarachnoid hemorrhage) (H) 09/22/2014     Priority: Medium     Actinic keratosis 01/28/2014     Priority: Medium     Seborrheic keratosis 01/28/2014     Priority: Medium     H/O adenomatous polyp of colon  04/27/2010     Priority: Medium     Diverticulosis of sigmoid colon 04/26/2010     Priority: Medium        Past Medical History:    Past Medical History:   Diagnosis Date     Actinic keratosis      Anemia      Diverticulosis of large intestine without perforation or abscess without bleeding      Elevated prostate specific antigen (PSA)      Essential (primary) hypertension      Generalized anxiety disorder      History of colonic polyps      Hydrocephalus (H)      Hyperlipidemia      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)      Lesion of oral mucosa      Nontraumatic subarachnoid hemorrhage (H)      Osteoarthritis of hip      Other seborrheic keratosis      Other specified disorders of bone, unspecified site (CODE)      Pain in right hip      Personal history of other medical treatment (CODE)      Plantar fascial fibromatosis      Retention of urine      Screening for other and unspecified cardiovascular conditions      Stiffness of unspecified joint, not elsewhere classified        Past Surgical History:    Past Surgical History:   Procedure Laterality Date     ARTHROPLASTY HIP ANTERIOR Right 5/17/2022    Procedure: ARTHROPLASTY, HIP, TOTAL, DIRECT ANTERIOR APPROACH;  Surgeon: Luis Miguel Lama MD;  Location: [facility] OR     COLONOSCOPY  04/26/2010    (04/26/2010),F/U 2015     COLONOSCOPY  10/15/2015    10/15/15,F/U 2025     EXTRACAPSULAR CATARACT EXTRATION WITH INTRAOCULAR LENS IMPLANT      2016     OTHER SURGICAL HISTORY      3/26/14,65357.0,OK TOTAL HIP ARTHROPLASTY,Left,direct anterior approach, Dr. Lama     OTHER SURGICAL HISTORY      2014,[id number],CRANIOTOMY     OTHER SURGICAL HISTORY      11/29/2016,,HERNIA REPAIR,Left,LIH with mesh       Family History:    Family History   Problem Relation Age of Onset     Cancer Mother 78        Cancer, lung cancer at age 78, developed brain metastases     Heart Disease Father 50        Heart Disease,heart failure     Other - See Comments Father          MS,  long-standing     Cancer Paternal Grandfather         Cancer, of cancer of unknown type     Other - See Comments Sister          MS       Social History:  Marital Status:   [2]  Social History     Tobacco Use     Smoking status: Former Smoker     Quit date: 1970     Years since quittin.4     Smokeless tobacco: Never Used     Tobacco comment: smoked off and on for 2 years   Vaping Use     Vaping Use: Never used   Substance Use Topics     Alcohol use: Not Currently     Alcohol/week: 2.0 standard drinks     Types: 2 Cans of beer per week     Comment: 2 times a week/2 drinks     Drug use: No        Medications:    magnesium hydroxide (MILK OF MAGNESIA) 400 MG/5ML suspension  acetaminophen (TYLENOL) 325 MG tablet  aspirin 81 MG EC tablet  Cholecalciferol (VITAMIN D3) 1000 UNITS CAPS  hydrochlorothiazide (HYDRODIURIL) 12.5 MG tablet  hydrOXYzine (ATARAX) 10 MG tablet  lisinopril (ZESTRIL) 40 MG tablet  meclizine (ANTIVERT) 12.5 MG tablet  Multiple Vitamins-Minerals (MULTILEX-T&M) TABS  oxyCODONE (ROXICODONE) 5 MG tablet  rosuvastatin (CRESTOR) 20 MG tablet  senna-docusate (SENOKOT-S/PERICOLACE) 8.6-50 MG tablet  tamsulosin (FLOMAX) 0.4 MG capsule  triamcinolone (KENALOG) 0.1 % external cream          Review of Systems   Constitutional: Negative for fever.   HENT: Negative for drooling and facial swelling.    Eyes: Negative for pain and visual disturbance.   Respiratory: Negative for stridor.    Cardiovascular: Negative for chest pain.   Gastrointestinal: Positive for constipation. Negative for abdominal pain, nausea and vomiting.   Genitourinary: Negative for flank pain.   Musculoskeletal: Negative for back pain.   Skin: Negative for pallor.   Neurological: Negative for tremors, seizures and facial asymmetry.   Psychiatric/Behavioral: Positive for confusion. Negative for agitation.   All other systems reviewed and are negative.      Physical Exam   BP: 130/60  Pulse: 83  Temp: 97.3  F (36.3  " C)  Height: 172.7 cm (5' 8\")  Weight: 88.5 kg (195 lb)  SpO2: 98 %      Physical Exam  Vitals and nursing note reviewed.   Constitutional:       General: He is not in acute distress.     Appearance: Normal appearance. He is not ill-appearing or toxic-appearing.   HENT:      Head: Normocephalic. No raccoon eyes, right periorbital erythema or left periorbital erythema.      Right Ear: No drainage or tenderness.      Left Ear: No drainage or tenderness.      Nose: Nose normal.   Eyes:      General: Lids are normal. Gaze aligned appropriately. No scleral icterus.     Extraocular Movements: Extraocular movements intact.   Neck:      Trachea: No tracheal deviation.   Cardiovascular:      Rate and Rhythm: Normal rate.   Pulmonary:      Effort: Pulmonary effort is normal. No respiratory distress.      Breath sounds: No stridor.   Abdominal:      General: Bowel sounds are normal.      Tenderness: There is no abdominal tenderness.      Comments:  abdomen is soft nontender no rebound or masses.  Bowel sounds are normal.   Musculoskeletal:         General: No deformity or signs of injury. Normal range of motion.      Cervical back: Normal range of motion. No signs of trauma.   Skin:     General: Skin is warm and dry.      Coloration: Skin is not jaundiced or pale.   Neurological:      General: No focal deficit present.      Mental Status: He is alert and oriented to person, place, and time.      GCS: GCS eye subscore is 4. GCS verbal subscore is 5. GCS motor subscore is 6.      Motor: No tremor or seizure activity.   Psychiatric:         Attention and Perception: Attention normal.         ED Course     Results for orders placed or performed during the hospital encounter of 05/31/22 (from the past 24 hour(s))   XR Abdomen 2 Views    Narrative    PROCEDURE:  XR ABDOMEN 2VIEWS    HISTORY:  constipation.    TECHNIQUE:  AP and supine radiographs of the abdomen.    COMPARISON:  5/17/2022    FINDINGS:     The lung bases are clear. No " "subdiaphragmatic air is seen.    No dilated loops of small bowel or air-fluid levels are seen.  Prominent stool is seen in the rectum.    Skin staples overlying the right hip arthroplasty.      Impression    IMPRESSION:    No obstruction or free air. Prominent stool is noted in the rectum.    CHASE BROTHERS MD         SYSTEM ID:  [id number]       Medications   enema compound (docusate/mag cit/mineral oil/NaPhos) mixture (286 mLs Rectal Given 5/31/22 2009)   docusate sodium (COLACE) capsule 200 mg (200 mg Oral Given 5/31/22 2009)   magnesium citrate solution 296 mL (296 mLs Oral Given 5/31/22 5534)       Assessments & Plan (with Medical Decision Making)     I have reviewed the nursing notes.    I have reviewed the findings, diagnosis, plan and need for follow up with the patient.      Current Discharge Medication List          Final diagnoses:   Slow transit constipation   Hx of traumatic brain injury   S/P total right hip arthroplasty   Retention of urine     Afebrile.  Vital signs stable.  Patient with constipation most likely from narcotic use for pain management.  Abdominal x-rays show No obstruction or free air. Prominent stool is noted in the rectum.  The patient was given a pink lady enema initially.  He was also given Colace orally.  He produced a very small amount of BM from this.  His spouse reports that he is often up and down between the commode and a regular chair that he is leaking stool everywhere.  She is very anxious and uncomfortable returning him home.   I discussed nursing home placement but she feels she can adequately take care of him.  The patient reports \"I just want to go home\".  She is concerned that he will leak stool all over the house and the bedroom.  I discussed with the patient and spouse that we could discharge him with adequate amounts of diapers as well as chux for home use.  She would like him to be hospitalized for this.   Our hospital is currently on divert.  The patient was " given oral magnesium citrate as well as warm prune juice.    Unfortunately there is no easy option.  I consulted with Dr. Mandel who feels that this patient should be able to be returned home.  Through mutual decision making patient's spouse as well as the patient's input they feel that he can return home with enough diapers and chux at this time.  The patient was discharged and temporarily boarded in order to reduce additional stool prior to transport home.   Return if there is any concerns for further evaluation as needed. I explained my diagnostic considerations and recommendations and the patient voiced an understanding and was in agreement with the treatment plan. All questions were answered to the best of my ability.  We discussed potential side effects of any prescribed or recommended therapies, as well as expectations for response to treatments.        5/31/2022   [facility] AND [facility]     Alfonso Mitchell PA-C  05/31/22 1978       Alfonso Mitchell PA-C  05/31/22 7703

## 2022-06-01 NOTE — ED TRIAGE NOTES
Pt is here today with constipation.  He is post op right hip that was done on May 17th.  Since that surgery, he has only had 2 bowel movements since that time.  At home was using colace 2 twice daily .  Today started to add miralax.  He is currently leaking a small amount of stool.  He is uncomfortable sitting in chairs.  He is restless.  Pt is worried that he has a bowel obstruction.      Pt's right leg is swollen at this time as well.      Triage Assessment     Row Name 05/31/22 2286       Triage Assessment (Adult)    Airway WDL WDL       Respiratory WDL    Respiratory WDL WDL       Skin Circulation/Temperature WDL    Skin Circulation/Temperature WDL WDL       Peripheral/Neurovascular WDL    Peripheral Neurovascular WDL WDL       Cognitive/Neuro/Behavioral WDL    Cognitive/Neuro/Behavioral WDL WDL

## 2022-06-02 ENCOUNTER — HOSPITAL ENCOUNTER (OUTPATIENT)
Dept: PHYSICAL THERAPY | Facility: OTHER | Age: 77
Setting detail: THERAPIES SERIES
Discharge: HOME | End: 2022-06-02
Attending: FAMILY MEDICINE
Payer: MEDICARE

## 2022-06-02 PROCEDURE — 97116 GAIT TRAINING THERAPY: CPT | Mod: GP

## 2022-06-02 PROCEDURE — 97110 THERAPEUTIC EXERCISES: CPT | Mod: GP

## 2022-06-03 ENCOUNTER — OFFICE VISIT (OUTPATIENT)
Dept: ORTHOPEDICS | Facility: OTHER | Age: 77
End: 2022-06-03
Attending: SPECIALIST
Payer: MEDICARE

## 2022-06-03 DIAGNOSIS — Z96.641 HISTORY OF TOTAL RIGHT HIP REPLACEMENT: Primary | ICD-10-CM

## 2022-06-03 PROCEDURE — G0463 HOSPITAL OUTPT CLINIC VISIT: HCPCS

## 2022-06-03 PROCEDURE — 99024 POSTOP FOLLOW-UP VISIT: CPT | Performed by: SPECIALIST

## 2022-06-03 NOTE — NURSING NOTE
"Chief Complaint   Patient presents with     RECHECK     Post op wound recheck      Pt is here today for a post op wound check.     Anne Sanchez LPN on 6/3/2022 at 9:26 AM     Initial There were no vitals taken for this visit. Estimated body mass index is 29.65 kg/m  as calculated from the following:    Height as of 5/31/22: 1.727 m (5' 8\").    Weight as of 5/31/22: 88.5 kg (195 lb).  Medication Reconciliation: complete    Anne Sanchez LPN  "

## 2022-06-03 NOTE — PROGRESS NOTES
Visit Date: 2022    HISTORY:  Nabil Villanueva returns for followup.  He had a total hip arthroplasty on the right by Dr. Lama on 2022.  He is back today.  He had missed followup with Dr. Lama, as he was in the Emergency Room with a problem with constipation.  This has subsequently resolved.    PHYSICAL EXAMINATION:  Reveals his right hip to show a healing incision.  There is no evidence of an infection.  The leg lengths appear clinically equal on gross examination.    X-RAYS:  None were obtained.    ASSESSMENT:  Status post right total hip arthroplasty 2022.    PLAN:  Will be to follow him with Dr. Lama as previously scheduled.  Dr. Lama will reassess him at that time.  He is weightbearing as tolerated.    Derrick Weber MD        D: 2022   T: 2022   MT: CRUZ    Name:     NABIL VILLANUEVA  MRN:      [id number]        Account:    [id number]   :      1945           Visit Date: 2022     Document: [id number]

## 2022-06-06 ENCOUNTER — MYC MEDICAL ADVICE (OUTPATIENT)
Dept: FAMILY MEDICINE | Facility: OTHER | Age: 77
End: 2022-06-06
Payer: COMMERCIAL

## 2022-06-06 ENCOUNTER — HOSPITAL ENCOUNTER (OUTPATIENT)
Dept: PHYSICAL THERAPY | Facility: OTHER | Age: 77
Setting detail: THERAPIES SERIES
Discharge: HOME | End: 2022-06-06
Attending: FAMILY MEDICINE
Payer: MEDICARE

## 2022-06-06 PROCEDURE — 97110 THERAPEUTIC EXERCISES: CPT | Mod: GP

## 2022-06-06 PROCEDURE — 97116 GAIT TRAINING THERAPY: CPT | Mod: GP

## 2022-06-08 DIAGNOSIS — Z96.641 HISTORY OF TOTAL RIGHT HIP REPLACEMENT: Primary | ICD-10-CM

## 2022-06-09 ENCOUNTER — HOSPITAL ENCOUNTER (OUTPATIENT)
Dept: PHYSICAL THERAPY | Facility: OTHER | Age: 77
Setting detail: THERAPIES SERIES
Discharge: HOME | End: 2022-06-09
Attending: FAMILY MEDICINE
Payer: MEDICARE

## 2022-06-09 PROCEDURE — 97116 GAIT TRAINING THERAPY: CPT | Mod: GP

## 2022-06-09 PROCEDURE — 97110 THERAPEUTIC EXERCISES: CPT | Mod: GP

## 2022-06-15 ENCOUNTER — OFFICE VISIT (OUTPATIENT)
Dept: ORTHOPEDICS | Facility: OTHER | Age: 77
End: 2022-06-15
Attending: ORTHOPAEDIC SURGERY
Payer: MEDICARE

## 2022-06-15 ENCOUNTER — HOSPITAL ENCOUNTER (OUTPATIENT)
Dept: GENERAL RADIOLOGY | Facility: OTHER | Age: 77
Discharge: HOME | End: 2022-06-15
Attending: ORTHOPAEDIC SURGERY
Payer: MEDICARE

## 2022-06-15 ENCOUNTER — HOSPITAL ENCOUNTER (OUTPATIENT)
Dept: PHYSICAL THERAPY | Facility: OTHER | Age: 77
Setting detail: THERAPIES SERIES
Discharge: HOME | End: 2022-06-15
Attending: FAMILY MEDICINE
Payer: MEDICARE

## 2022-06-15 DIAGNOSIS — Z96.641 HISTORY OF TOTAL RIGHT HIP REPLACEMENT: ICD-10-CM

## 2022-06-15 DIAGNOSIS — Z96.641 HISTORY OF TOTAL RIGHT HIP REPLACEMENT: Primary | ICD-10-CM

## 2022-06-15 PROCEDURE — 73502 X-RAY EXAM HIP UNI 2-3 VIEWS: CPT

## 2022-06-15 PROCEDURE — 99024 POSTOP FOLLOW-UP VISIT: CPT | Performed by: ORTHOPAEDIC SURGERY

## 2022-06-15 PROCEDURE — 97110 THERAPEUTIC EXERCISES: CPT | Mod: GP

## 2022-06-15 PROCEDURE — 97116 GAIT TRAINING THERAPY: CPT | Mod: GP

## 2022-06-15 NOTE — PROGRESS NOTES
Visit Date: 06/15/2022    REASON FOR EVALUATION:  Right hip replacement.    HISTORY:  Nabil comes back.  He is four weeks out hip replacement.  He is doing well, no major concerns, here for x-rays and examination.    PHYSICAL EXAM:  Examination of his hip shows fluid range of motion, minimal swelling.  Neurovascular examination otherwise intact.    IMAGING:  X-rays reviewed, 2 views of the hip.  The patient's components are in stable alignment and position at this current time.    IMPRESSION:  Total hip replacement, doing well.    PLAN:  Increase activities as tolerated.  Follow up exam at 1-year checkup with 2 views of the right hip.    Luis Miguel Lama MD        D: 06/15/2022   T: 06/15/2022   MT: SHAVONNE    Name:     NABIL VILLANUEVA  MRN:      [id number]        Account:    [id number]   :      1945           Visit Date: 06/15/2022     Document: [id number]

## 2022-06-15 NOTE — NURSING NOTE
"Chief Complaint   Patient presents with     RECHECK     Hip Recheck        Pt is here today for a right hip recheck.     Anne Sanchez LPN on 6/15/2022 at 12:17 PM       Initial There were no vitals taken for this visit. Estimated body mass index is 29.65 kg/m  as calculated from the following:    Height as of 5/31/22: 1.727 m (5' 8\").    Weight as of 5/31/22: 88.5 kg (195 lb).  Medication Reconciliation: complete    Anne Sanchez LPN  "

## 2022-06-17 ENCOUNTER — HOSPITAL ENCOUNTER (OUTPATIENT)
Dept: PHYSICAL THERAPY | Facility: OTHER | Age: 77
Setting detail: THERAPIES SERIES
Discharge: HOME | End: 2022-06-17
Attending: FAMILY MEDICINE
Payer: MEDICARE

## 2022-06-17 PROCEDURE — 97110 THERAPEUTIC EXERCISES: CPT | Mod: GP

## 2022-06-21 ENCOUNTER — HOSPITAL ENCOUNTER (OUTPATIENT)
Dept: PHYSICAL THERAPY | Facility: OTHER | Age: 77
Setting detail: THERAPIES SERIES
Discharge: HOME | End: 2022-06-21
Attending: FAMILY MEDICINE
Payer: MEDICARE

## 2022-06-21 PROCEDURE — 97110 THERAPEUTIC EXERCISES: CPT | Mod: GP

## 2022-06-24 ENCOUNTER — HOSPITAL ENCOUNTER (OUTPATIENT)
Dept: PHYSICAL THERAPY | Facility: OTHER | Age: 77
Setting detail: THERAPIES SERIES
Discharge: HOME | End: 2022-06-24
Attending: FAMILY MEDICINE
Payer: MEDICARE

## 2022-06-24 PROCEDURE — 97110 THERAPEUTIC EXERCISES: CPT | Mod: GP

## 2022-06-26 ENCOUNTER — OFFICE VISIT (OUTPATIENT)
Dept: FAMILY MEDICINE | Facility: OTHER | Age: 77
End: 2022-06-26
Attending: FAMILY MEDICINE
Payer: COMMERCIAL

## 2022-06-26 VITALS
BODY MASS INDEX: 27.17 KG/M2 | OXYGEN SATURATION: 97 % | HEART RATE: 47 BPM | WEIGHT: 178.7 LBS | RESPIRATION RATE: 16 BRPM | TEMPERATURE: 96.4 F | DIASTOLIC BLOOD PRESSURE: 62 MMHG | SYSTOLIC BLOOD PRESSURE: 130 MMHG

## 2022-06-26 DIAGNOSIS — H61.21 IMPACTED CERUMEN OF RIGHT EAR: Primary | ICD-10-CM

## 2022-06-26 PROCEDURE — G0463 HOSPITAL OUTPT CLINIC VISIT: HCPCS | Mod: 25 | Performed by: PHYSICIAN ASSISTANT

## 2022-06-26 PROCEDURE — 69209 REMOVE IMPACTED EAR WAX UNI: CPT | Mod: RT | Performed by: PHYSICIAN ASSISTANT

## 2022-06-26 PROCEDURE — 99213 OFFICE O/P EST LOW 20 MIN: CPT | Performed by: PHYSICIAN ASSISTANT

## 2022-06-26 PROCEDURE — G0463 HOSPITAL OUTPT CLINIC VISIT: HCPCS | Performed by: PHYSICIAN ASSISTANT

## 2022-06-26 ASSESSMENT — PAIN SCALES - GENERAL: PAINLEVEL: NO PAIN (0)

## 2022-06-26 NOTE — NURSING NOTE
Pt states that he feels like he has water on his right ear for the last day and a half.  NO OTC meds taken

## 2022-06-26 NOTE — PROGRESS NOTES
ASSESSMENT/PLAN:    I have reviewed the nursing notes.  I have reviewed the findings, diagnosis, plan and need for follow up with the patient.    1. Impacted cerumen of right ear  - Vital signs stable. PE consistent with cerumen impaction of right ear. Ear flush performed today and was successful. Recommend avoid using Qtip as can further push wax back into ears, avoid prolonged use of ear buds/hearing aids/ear plugs if possible. Also, can try hydrogen peroxide, use of debrox over the counter or other approved wax softening treatments. If you are attempting to flush ears at home avoid cold water as this will make you dizzy, recommend luke warm or body temperature water. Patient is in agreement and understanding of the above treatment plan. All questions and concerns were addressed and answered to patient's satisfaction. AVS reviewed with patient.     Discussed warning signs/symptoms indicative of need to f/u    Follow up if symptoms persist or worsen or concerns    I explained my diagnostic considerations and recommendations to the patient, who voiced understanding and agreement with the treatment plan. All questions were answered. We discussed potential side effects of any prescribed or recommended therapies, as well as expectations for response to treatments.    Jen Marquez PA-C  6/26/2022  10:11 AM    HPI:    Chao Kearney is a 76 year old male  who presents to [facility] today for concerns of right ear pain x 1.5 days duration.     Presence of the following:   No fevers or chills.  No sore throat/pharyngitis/tonsillitis.   Yes allergy/URI Symptoms  Yes Muffled Sounds/Change in Hearing  Yes Sensation of Fullness in Ear(s)  No Ringing in Ears/Tinnitus  No Balance Changes  No Dizziness  No Headache   No Ear Drainage  Denies persistent hearing loss, foul smelling odor from ear, changes in vision, nausea, vomiting, diarrhea, chest pain, shortness of breath.     No Recent swimming/hot tub  No submerging of  head in shower/bathtub.     No Recent URI or other illness  History of otitis media: No  History of HEENT surgery (PE tubes, tonsillectomy/adenoidectomy, etc.): No  Recent Course of ABX: No    Treatments Tried: dunking head under water in hot tob  Prior History of Similar Symptoms: No    PCP - MD Joey    Past Medical History:   Diagnosis Date     Actinic keratosis     No Comments Provided     Anemia     No Comments Provided     Diverticulosis of large intestine without perforation or abscess without bleeding     No Comments Provided     Elevated prostate specific antigen (PSA)     No Comments Provided     Essential (primary) hypertension     No Comments Provided     Generalized anxiety disorder     No Comments Provided     History of colonic polyps     No Comments Provided     Hydrocephalus (H)     (obstructive)     Hyperlipidemia     No Comments Provided     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 Marin 1/24/05     Nontraumatic subarachnoid hemorrhage (H)     No Comments Provided     Osteoarthritis of hip     No Comments Provided     Other seborrheic keratosis     No Comments Provided     Other specified disorders of bone, unspecified site (CODE)     1/05,Lingual sean, bony growth, lower mandible, evaluation Dr. Marin 1/05     Pain in right hip     No Comments Provided     Personal history of other medical treatment (CODE)     05/16/05,Q wave in 3 and AVF leads on electrocardiogram done     Plantar fascial fibromatosis     No Comments Provided     Retention of urine     No Comments Provided     Screening for other and unspecified cardiovascular conditions     05/20/2005,Stress echocardiogram is negative for ischemia with ejection fraction of 85%     Stiffness of unspecified joint, not elsewhere classified     No Comments Provided     Past Surgical History:   Procedure Laterality  Date     ARTHROPLASTY HIP ANTERIOR Right 2022    Procedure: ARTHROPLASTY, HIP, TOTAL, DIRECT ANTERIOR APPROACH;  Surgeon: Luis Miguel Lama MD;  Location: [facility] OR     COLONOSCOPY  2010    (2010),F/U      COLONOSCOPY  10/15/2015    10/15/15,F/U      EXTRACAPSULAR CATARACT EXTRATION WITH INTRAOCULAR LENS IMPLANT           OTHER SURGICAL HISTORY      3/26/14,70783.0,MO TOTAL HIP ARTHROPLASTY,Left,direct anterior approach, Dr. Lama     OTHER SURGICAL HISTORY      ,[id number],CRANIOTOMY     OTHER SURGICAL HISTORY      2016,,HERNIA REPAIR,Left,LIH with mesh     Social History     Tobacco Use     Smoking status: Former Smoker     Quit date: 1970     Years since quittin.5     Smokeless tobacco: Never Used     Tobacco comment: smoked off and on for 2 years   Substance Use Topics     Alcohol use: Not Currently     Alcohol/week: 2.0 standard drinks     Types: 2 Cans of beer per week     Comment: 2 times a week/2 drinks     Current Outpatient Medications   Medication Sig Dispense Refill     acetaminophen (TYLENOL) 325 MG tablet Take 2 tablets (650 mg) by mouth every 4 hours as needed for other (mild pain) 100 tablet 0     aspirin 81 MG EC tablet Take 1 tablet (81 mg) by mouth 2 times daily 60 tablet 0     Cholecalciferol (VITAMIN D3) 1000 UNITS CAPS Take 1 capsule by mouth daily       hydrochlorothiazide (HYDRODIURIL) 12.5 MG tablet TAKE 1 TABLET EVERY DAY 90 tablet 3     hydrOXYzine (ATARAX) 10 MG tablet Take 1 tablet (10 mg) by mouth every 6 hours as needed for itching or anxiety (with pain, moderate pain) 30 tablet 0     lisinopril (ZESTRIL) 40 MG tablet TAKE 1 TABLET EVERY DAY 90 tablet 3     meclizine (ANTIVERT) 12.5 MG tablet TAKE 2 TABLETS(25 MG) BY MOUTH FOUR TIMES DAILY AS NEEDED FOR DIZZINESS 20 tablet 3     Multiple Vitamins-Minerals (MULTILEX-T&M) TABS Take 1 tablet by mouth daily       oxyCODONE (ROXICODONE) 5 MG tablet Take 1-2 tablets (5-10 mg) by mouth every 3  hours as needed for pain (Moderate to Severe) 30 tablet 0     rosuvastatin (CRESTOR) 20 MG tablet Take 20 mg by mouth daily       senna-docusate (SENOKOT-S/PERICOLACE) 8.6-50 MG tablet Take 1-2 tablets by mouth 2 times daily Take while on oral narcotics to prevent or treat constipation. 30 tablet 0     tamsulosin (FLOMAX) 0.4 MG capsule Take 2 capsules (0.8 mg) by mouth daily with food 180 capsule 3     triamcinolone (KENALOG) 0.1 % external cream Apply topically to affected area(s) 3 times daily. 30 g 1     Allergies   Allergen Reactions     Fluress Unknown     Past medical history, past surgical history, current medications and allergies reviewed and accurate to the best of my knowledge.      ROS:  Refer to HPI    /62 (BP Location: Left arm, Patient Position: Sitting, Cuff Size: Adult Regular)   Pulse (!) 47   Temp (!) 94.6  F (34.8  C) (Tympanic)   Resp 16   Wt 81.1 kg (178 lb 11.2 oz)   SpO2 97%   BMI 27.17 kg/m      EXAM:  General Appearance: Well appearing 76 year old male, appropriate appearance for age. No acute distress   Ears: Left TM intact, translucent with bony landmarks appreciated, no erythema, no effusion, no bulging, no purulence.  Right TM intact, translucent with bony landmarks appreciated, no erythema, no effusion, no bulging, no purulence.  Left auditory canal clear.  Cerumen impaction right auditory canal.  Normal external ears, non tender.  Eyes: conjunctivae normal without erythema or irritation, corneas clear, no drainage or crusting, no eyelid swelling, pupils equal   Oropharynx: moist mucous membranes, posterior pharynx without erythema, tonsils symmetric, no erythema, no exudates or petechiae, no post nasal drip seen, no trismus, voice clear.    Sinuses:  No sinus tenderness upon palpation of the frontal or maxillary sinuses  Nose:  Bilateral nares: no erythema, no edema, no drainage or congestion   Neck: supple without adenopathy  Respiratory: normal chest wall and  respirations.  Normal effort.  Clear to auscultation bilaterally, no wheezing, crackles or rhonchi.  No increased work of breathing.  No cough appreciated.  Cardiac: RRR with no murmurs  Dermatological: no rashes noted of exposed skin  Psychological: normal affect, alert, oriented, and pleasant.     Labs:  None     Xray:  None

## 2022-06-28 ENCOUNTER — HOSPITAL ENCOUNTER (OUTPATIENT)
Dept: PHYSICAL THERAPY | Facility: OTHER | Age: 77
Setting detail: THERAPIES SERIES
Discharge: HOME | End: 2022-06-28
Attending: ORTHOPAEDIC SURGERY
Payer: MEDICARE

## 2022-06-28 PROCEDURE — 97110 THERAPEUTIC EXERCISES: CPT | Mod: GP

## 2022-06-28 NOTE — PROGRESS NOTES
[facility]    Outpatient Physical Therapy Progress Note  Patient: Chao Kearney  : 1945    Beginning/End Dates of Reporting Period:  22 to 22    Referring Provider: Dr. Cook    Therapy Diagnosis: R hip weakness, gait instability s/p R RICKY     Client Self Report: pt reports he has not been walking outside due to mosquitos. pt is feeling better everyday.    Objective Measurements:  Objective Measure: pain  Details: 1/10 pain during transfers and gait.    Goals:  Goal Identifier pain   Goal Description pt will report a 3/10 pain or less in the R hip during bed mobility and gait in order to progress towards independence with gait and transfers.   Target Date 22   Date Met  22   Progress (detail required for progress note): MET, pt reporting some mild disconfort but does not report pain     Goal Identifier strength   Goal Description Pt will demonstrate ability to lift his R hip in a SLR x 10 reps without compensation to height of opposite knee in order to increase functional strength for gait mechanics.   Target Date 22   Date Met      Progress (detail required for progress note): (P) progressing, able to complete 10 marching with good clearance     Goal Identifier gait   Goal Description pt will ambulate for 200 feet on even surfaces without an assistive device with step through pattern and B foot clearance to progress to normalized walking pattern.   Target Date 22   Date Met  22   Progress (detail required for progress note): MET, slight antalgic gait but pt able to complete with step through pattern and B foot clearance.     Goal Identifier Balance   Goal Description Pt will demonstrate ability to complete B tandem stance for 30 sec without UE assist in order to progress static stablity for walking on uneven ground in the woods.    Target Date 22   Date  Met      Progress (detail required for progress note): progressing, able to make 20 sec B.     Plan:  Continue therapy per current plan of care.    Discharge:  No

## 2022-06-30 ENCOUNTER — HOSPITAL ENCOUNTER (OUTPATIENT)
Dept: PHYSICAL THERAPY | Facility: OTHER | Age: 77
Setting detail: THERAPIES SERIES
Discharge: HOME | End: 2022-06-30
Attending: ORTHOPAEDIC SURGERY
Payer: MEDICARE

## 2022-06-30 PROCEDURE — 97110 THERAPEUTIC EXERCISES: CPT | Mod: GP

## 2022-07-05 ENCOUNTER — HOSPITAL ENCOUNTER (OUTPATIENT)
Dept: PHYSICAL THERAPY | Facility: OTHER | Age: 77
Setting detail: THERAPIES SERIES
Discharge: HOME | End: 2022-07-05
Attending: ORTHOPAEDIC SURGERY
Payer: MEDICARE

## 2022-07-05 PROCEDURE — 97110 THERAPEUTIC EXERCISES: CPT | Mod: GP

## 2022-07-07 ENCOUNTER — HOSPITAL ENCOUNTER (OUTPATIENT)
Dept: PHYSICAL THERAPY | Facility: OTHER | Age: 77
Setting detail: THERAPIES SERIES
Discharge: HOME | End: 2022-07-07
Attending: ORTHOPAEDIC SURGERY
Payer: MEDICARE

## 2022-07-07 PROCEDURE — 97110 THERAPEUTIC EXERCISES: CPT | Mod: GP

## 2022-07-16 ENCOUNTER — HEALTH MAINTENANCE LETTER (OUTPATIENT)
Age: 77
End: 2022-07-16

## 2022-08-04 ENCOUNTER — HOSPITAL ENCOUNTER (OUTPATIENT)
Dept: PHYSICAL THERAPY | Facility: OTHER | Age: 77
Setting detail: THERAPIES SERIES
Discharge: HOME | End: 2022-08-04
Attending: FAMILY MEDICINE
Payer: MEDICARE

## 2022-08-04 PROCEDURE — 97110 THERAPEUTIC EXERCISES: CPT | Mod: GP

## 2022-08-04 NOTE — PROGRESS NOTES
[facility]    OUTPATIENT PHYSICAL THERAPY  PLAN OF TREATMENT FOR OUTPATIENT REHABILITATION AND PROGRESS NOTE           Patient's Last Name, First Name, Chao Crowe Date of Birth  1945   Provider's Name  [facility] Medical Record No.  [id number]    Onset Date  5/17/22 Start of Care Date  5/24/22   Type:     _X_PT   ___OT   ___SLP Medical Diagnosis  S/p R RICKY   PT Diagnosis  R hip weakness, gait instability s/p R RICKY Plan of Treatment  Frequency/Duration: 1x/week x 4  Certification date from 7/19/22 to 8/16/22   Plan of Treatment/Functional Goals:  balance training, bed mobility training, gait training, joint mobilization, manual therapy, motor coordination training, neuromuscular re-education, ROM, strengthening, stretching, transfer training     Cryotherapy, Electrical stimulation, Hot packs, Ultrasound, TENS  vasopneumatic compression    Goals:  Goal Identifier pain   Goal Description pt will report a 3/10 pain or less in the R hip during bed mobility and gait in order to progress towards independence with gait and transfers.   Target Date 06/22/22   Date Met  06/21/22   Progress (detail required for progress note): MET, pt reporting some mild disconfort but does not report pain     Goal Identifier strength   Goal Description Pt will demonstrate ability to lift his R hip in a SLR x 10 reps without compensation to height of opposite knee in order to increase functional strength for gait mechanics.   Target Date 07/06/22   Date Met      Progress (detail required for progress note):  Progressing, medium difficulty to complete.      Goal Identifier gait   Goal Description pt will ambulate for 200 feet on even surfaces without an assistive device with step through pattern and B foot clearance to progress to normalized walking pattern.   Target Date  07/20/22   Date Met  06/21/22   Progress (detail required for progress note): MET, slight antalgic gait but pt able to complete with step through pattern and B foot clearance.     Goal Identifier Balance   Goal Description Pt will demonstrate ability to complete B tandem stance for 30 sec without UE assist in order to progress static stablity for walking on uneven ground in the woods.    Target Date 06/22/22   Date Met      Progress (detail required for progress note): progressing, able to make 20 sec B.       Certification to complete one final session to re-assess goals and progress. Check in to make sure pt is progressing well on his own.        I CERTIFY THE NEED FOR THESE SERVICES FURNISHED UNDER        THIS PLAN OF TREATMENT AND WHILE UNDER MY CARE     (Physician co-signature of this document indicates review and certification of the therapy plan).              Referring Provider: Dr. Joey Iyer, PT

## 2022-08-09 ENCOUNTER — MYC MEDICAL ADVICE (OUTPATIENT)
Dept: FAMILY MEDICINE | Facility: OTHER | Age: 77
End: 2022-08-09

## 2022-08-09 NOTE — TELEPHONE ENCOUNTER
Please forward this message to orthopedics and ask them to contact the patient's wife.  He has memory issues.  Chan Cook MD on 8/9/2022 at 1:20 PM

## 2022-08-23 ENCOUNTER — OFFICE VISIT (OUTPATIENT)
Dept: FAMILY MEDICINE | Facility: OTHER | Age: 77
End: 2022-08-23
Attending: PHYSICIAN ASSISTANT
Payer: COMMERCIAL

## 2022-08-23 VITALS
TEMPERATURE: 98.1 F | BODY MASS INDEX: 26.91 KG/M2 | HEART RATE: 67 BPM | OXYGEN SATURATION: 98 % | DIASTOLIC BLOOD PRESSURE: 80 MMHG | SYSTOLIC BLOOD PRESSURE: 124 MMHG | WEIGHT: 177 LBS

## 2022-08-23 DIAGNOSIS — Z79.2 PROPHYLACTIC ANTIBIOTIC: ICD-10-CM

## 2022-08-23 DIAGNOSIS — S69.91XA FISH HOOK INJURY OF FINGER OF RIGHT HAND, INITIAL ENCOUNTER: Primary | ICD-10-CM

## 2022-08-23 PROCEDURE — G0463 HOSPITAL OUTPT CLINIC VISIT: HCPCS | Mod: 25

## 2022-08-23 PROCEDURE — 99213 OFFICE O/P EST LOW 20 MIN: CPT | Performed by: NURSE PRACTITIONER

## 2022-08-23 PROCEDURE — 250N000013 HC RX MED GY IP 250 OP 250 PS 637: Performed by: NURSE PRACTITIONER

## 2022-08-23 PROCEDURE — 250N000009 HC RX 250: Performed by: NURSE PRACTITIONER

## 2022-08-23 RX ORDER — CEPHALEXIN 500 MG/1
500 CAPSULE ORAL 2 TIMES DAILY
Qty: 10 CAPSULE | Refills: 0 | Status: SHIPPED | OUTPATIENT
Start: 2022-08-23 | End: 2022-08-28

## 2022-08-23 RX ORDER — NEOMYCIN/BACITRACIN/POLYMYXINB 3.5-400-5K
OINTMENT (GRAM) TOPICAL ONCE
Status: COMPLETED | OUTPATIENT
Start: 2022-08-23 | End: 2022-08-23

## 2022-08-23 RX ORDER — LIDOCAINE HYDROCHLORIDE 10 MG/ML
5 INJECTION, SOLUTION EPIDURAL; INFILTRATION; INTRACAUDAL; PERINEURAL ONCE
Status: COMPLETED | OUTPATIENT
Start: 2022-08-23 | End: 2022-08-23

## 2022-08-23 RX ADMIN — BACITRACIN ZINC, NEOMYCIN, POLYMYXIN B: 400; 3.5; 5 OINTMENT TOPICAL at 18:26

## 2022-08-23 RX ADMIN — LIDOCAINE HYDROCHLORIDE 5 ML: 10 INJECTION, SOLUTION EPIDURAL; INFILTRATION; INTRACAUDAL; PERINEURAL at 18:27

## 2022-08-23 ASSESSMENT — PAIN SCALES - GENERAL: PAINLEVEL: NO PAIN (0)

## 2022-09-06 NOTE — PROGRESS NOTES
[facility]    Outpatient Physical Therapy Discharge Note  Patient: Chao Kearney  : 1945    Beginning/End Dates of Reporting Period:   to 22    Referring Provider: Dr. Cook    Therapy Diagnosis: R hip weakness, gait instability s/p R RICKY     Client Self Report: pt reports he is doing well and walking a lot. is ready to progress on his own. would like to continue to use medex machines but understands he cannot at this time.    Objective Measurements:  Objective Measure: pain  Details: 0/10  Objective Measure: ROM  Details: 90 deg hip flexion (baseline)    Goals:  Goal Identifier pain   Goal Description pt will report a 3/10 pain or less in the R hip during bed mobility and gait in order to progress towards independence with gait and transfers.   Target Date 22   Date Met  22   Progress (detail required for progress note): MET, pt reporting some mild disconfort but does not report pain     Goal Identifier strength   Goal Description Pt will demonstrate ability to lift his R hip in a SLR x 10 reps without compensation to height of opposite knee in order to increase functional strength for gait mechanics.   Target Date 22   Date Met  22   Progress (detail required for progress note): MET, pt able to complete with minimal difficulty     Goal Identifier gait   Goal Description pt will ambulate for 200 feet on even surfaces without an assistive device with step through pattern and B foot clearance to progress to normalized walking pattern.   Target Date 22   Date Met  22   Progress (detail required for progress note): MET, slight antalgic gait but pt able to complete with step through pattern and B foot clearance.     Goal Identifier Balance   Goal Description Pt will demonstrate ability to complete B tandem stance for 30 sec without UE assist in order to  progress static stablity for walking on uneven ground in the woods.    Target Date 06/22/22   Date Met  08/04/22   Progress (detail required for progress note): MET, more difficult with the R foot forward     Plan:  Discharge from therapy.    Discharge:    Reason for Discharge: Patient has met all goals.  No further expectation of progress.    Equipment Issued: HEP    Discharge Plan: Patient to continue home program.

## 2022-09-08 ENCOUNTER — MYC MEDICAL ADVICE (OUTPATIENT)
Dept: FAMILY MEDICINE | Facility: OTHER | Age: 77
End: 2022-09-08

## 2022-09-08 DIAGNOSIS — I10 ESSENTIAL HYPERTENSION, BENIGN: ICD-10-CM

## 2022-09-08 RX ORDER — ROSUVASTATIN CALCIUM 20 MG/1
20 TABLET, COATED ORAL DAILY
Qty: 90 TABLET | Refills: 0 | Status: CANCELLED | OUTPATIENT
Start: 2022-09-08

## 2022-09-08 RX ORDER — HYDROCHLOROTHIAZIDE 12.5 MG/1
TABLET ORAL
Qty: 90 TABLET | Refills: 0 | Status: CANCELLED | OUTPATIENT
Start: 2022-09-08

## 2022-09-08 RX ORDER — LISINOPRIL 40 MG/1
TABLET ORAL
Qty: 90 TABLET | Refills: 0 | Status: CANCELLED | OUTPATIENT
Start: 2022-09-08

## 2022-09-08 NOTE — TELEPHONE ENCOUNTER
Pending Prescriptions:                       Disp   Refills    hydrochlorothiazide (HYDRODIURIL) 12.5 MG*90 tab*0            Sig: TAKE 1 TABLET EVERY DAY    rosuvastatin (CRESTOR) 20 MG tablet       90 tab*0            Sig: Take 1 tablet (20 mg) by mouth daily    lisinopril (ZESTRIL) 40 MG tablet         90 tab*0            Sig: TAKE 1 TABLET EVERY DAY    Last OV- DUE FOR WELLNESS EXAM (message sent back to patient informing him to schedule it)     Next OV- none scheduled     Di Hong CMA on 9/8/2022 at 11:04 AM

## 2022-09-17 ENCOUNTER — HEALTH MAINTENANCE LETTER (OUTPATIENT)
Age: 77
End: 2022-09-17

## 2022-10-03 DIAGNOSIS — E78.2 MIXED HYPERLIPIDEMIA: Primary | ICD-10-CM

## 2022-10-03 DIAGNOSIS — I10 ESSENTIAL HYPERTENSION, BENIGN: ICD-10-CM

## 2022-10-03 NOTE — LETTER
October 6, 2022      Chao Kearney  [address]  [place] [id number]        Dear Chao,       This letter is to remind you that you are due for your annual exam with Chan Cook. Your last comprehensive visit was more than 12 months ago.    Additional refills require you to complete this appointment.    Please call the clinic at [phone] to schedule your appointment.    If you should require additional refills before your scheduled appointment, please contact your pharmacy and we will refill your medication until the date of your appointment.    If you are no longer seeing Chan Cook for primary care, please call to let us know.       Thank you for choosing [facility] and [facility] for your health care needs.    Sincerely,    Refill RN  [facility]

## 2022-10-06 ENCOUNTER — MYC MEDICAL ADVICE (OUTPATIENT)
Dept: FAMILY MEDICINE | Facility: OTHER | Age: 77
End: 2022-10-06

## 2022-10-06 NOTE — TELEPHONE ENCOUNTER
Routing refill request to provider for review/approval because:    LOV: 5/26/22    Patient due for annual review  Letter and my chart message sent  Will route to outreach to call patient and help assist in scheduling appointment.    Patricia Baptiste RN on 10/6/2022 at 9:09 AM

## 2022-10-10 RX ORDER — LISINOPRIL 40 MG/1
TABLET ORAL
Qty: 90 TABLET | Refills: 0 | Status: SHIPPED | OUTPATIENT
Start: 2022-10-10 | End: 2022-10-11

## 2022-10-10 RX ORDER — ROSUVASTATIN CALCIUM 20 MG/1
TABLET, COATED ORAL
Qty: 90 TABLET | Refills: 0 | Status: SHIPPED | OUTPATIENT
Start: 2022-10-10 | End: 2022-10-11

## 2022-10-10 ASSESSMENT — ENCOUNTER SYMPTOMS
PALPITATIONS: 0
ARTHRALGIAS: 0
HEARTBURN: 0
JOINT SWELLING: 0
DIARRHEA: 0
HEMATOCHEZIA: 0
MYALGIAS: 0
SHORTNESS OF BREATH: 0
DYSURIA: 0
DIZZINESS: 1
FEVER: 0
HEADACHES: 0
CONSTIPATION: 0
HEMATURIA: 0
ABDOMINAL PAIN: 0
FREQUENCY: 0
WEAKNESS: 0
PARESTHESIAS: 0
COUGH: 0
NAUSEA: 0
SORE THROAT: 0
EYE PAIN: 0
CHILLS: 0
NERVOUS/ANXIOUS: 0

## 2022-10-10 ASSESSMENT — ANXIETY QUESTIONNAIRES
5. BEING SO RESTLESS THAT IT IS HARD TO SIT STILL: NOT AT ALL
2. NOT BEING ABLE TO STOP OR CONTROL WORRYING: NOT AT ALL
6. BECOMING EASILY ANNOYED OR IRRITABLE: NOT AT ALL
GAD7 TOTAL SCORE: 0
7. FEELING AFRAID AS IF SOMETHING AWFUL MIGHT HAPPEN: NOT AT ALL
4. TROUBLE RELAXING: NOT AT ALL
7. FEELING AFRAID AS IF SOMETHING AWFUL MIGHT HAPPEN: NOT AT ALL
3. WORRYING TOO MUCH ABOUT DIFFERENT THINGS: NOT AT ALL
1. FEELING NERVOUS, ANXIOUS, OR ON EDGE: NOT AT ALL
GAD7 TOTAL SCORE: 0
GAD7 TOTAL SCORE: 0

## 2022-10-10 ASSESSMENT — PATIENT HEALTH QUESTIONNAIRE - PHQ9
SUM OF ALL RESPONSES TO PHQ QUESTIONS 1-9: 0
SUM OF ALL RESPONSES TO PHQ QUESTIONS 1-9: 0

## 2022-10-10 ASSESSMENT — ACTIVITIES OF DAILY LIVING (ADL): CURRENT_FUNCTION: NO ASSISTANCE NEEDED

## 2022-10-11 ENCOUNTER — OFFICE VISIT (OUTPATIENT)
Dept: FAMILY MEDICINE | Facility: OTHER | Age: 77
End: 2022-10-11
Attending: FAMILY MEDICINE
Payer: COMMERCIAL

## 2022-10-11 VITALS
WEIGHT: 181 LBS | BODY MASS INDEX: 27.43 KG/M2 | HEART RATE: 60 BPM | SYSTOLIC BLOOD PRESSURE: 116 MMHG | OXYGEN SATURATION: 98 % | DIASTOLIC BLOOD PRESSURE: 62 MMHG | HEIGHT: 68 IN | RESPIRATION RATE: 16 BRPM | TEMPERATURE: 96.9 F

## 2022-10-11 DIAGNOSIS — N40.1 BENIGN NON-NODULAR PROSTATIC HYPERPLASIA WITH LOWER URINARY TRACT SYMPTOMS: ICD-10-CM

## 2022-10-11 DIAGNOSIS — N42.9 PROSTATE DISORDER: ICD-10-CM

## 2022-10-11 DIAGNOSIS — Z23 NEEDS FLU SHOT: ICD-10-CM

## 2022-10-11 DIAGNOSIS — I10 ESSENTIAL HYPERTENSION, BENIGN: ICD-10-CM

## 2022-10-11 DIAGNOSIS — Z00.00 MEDICARE ANNUAL WELLNESS VISIT, SUBSEQUENT: Primary | ICD-10-CM

## 2022-10-11 DIAGNOSIS — R29.898 BILATERAL ARM WEAKNESS: ICD-10-CM

## 2022-10-11 DIAGNOSIS — M21.41 ACQUIRED PES PLANUS OF BOTH FEET: ICD-10-CM

## 2022-10-11 DIAGNOSIS — S06.9X9S BRAIN INJURY WITH LOSS OF CONSCIOUSNESS, SEQUELA (H): ICD-10-CM

## 2022-10-11 DIAGNOSIS — E78.2 MIXED HYPERLIPIDEMIA: ICD-10-CM

## 2022-10-11 DIAGNOSIS — M21.42 ACQUIRED PES PLANUS OF BOTH FEET: ICD-10-CM

## 2022-10-11 DIAGNOSIS — Z23 HIGH PRIORITY FOR 2019-NCOV VACCINE: ICD-10-CM

## 2022-10-11 LAB
ALBUMIN SERPL-MCNC: 4.3 G/DL (ref 3.5–5.7)
ALP SERPL-CCNC: 53 U/L (ref 34–104)
ALT SERPL W P-5'-P-CCNC: 15 U/L (ref 7–52)
ANION GAP SERPL CALCULATED.3IONS-SCNC: 4 MMOL/L (ref 3–14)
AST SERPL W P-5'-P-CCNC: 23 U/L (ref 13–39)
BASOPHILS # BLD AUTO: 0 10E3/UL (ref 0–0.2)
BASOPHILS NFR BLD AUTO: 1 %
BILIRUB SERPL-MCNC: 0.5 MG/DL (ref 0.3–1)
BUN SERPL-MCNC: 17 MG/DL (ref 7–25)
CALCIUM SERPL-MCNC: 9.8 MG/DL (ref 8.6–10.3)
CHLORIDE BLD-SCNC: 103 MMOL/L (ref 98–107)
CHOLEST SERPL-MCNC: 184 MG/DL
CO2 SERPL-SCNC: 32 MMOL/L (ref 21–31)
CREAT SERPL-MCNC: 1.08 MG/DL (ref 0.7–1.3)
EOSINOPHIL # BLD AUTO: 0.1 10E3/UL (ref 0–0.7)
EOSINOPHIL NFR BLD AUTO: 1 %
ERYTHROCYTE [DISTWIDTH] IN BLOOD BY AUTOMATED COUNT: 13 % (ref 10–15)
FASTING STATUS PATIENT QL REPORTED: NORMAL
GFR SERPL CREATININE-BSD FRML MDRD: 71 ML/MIN/1.73M2
GLUCOSE BLD-MCNC: 93 MG/DL (ref 70–105)
HCT VFR BLD AUTO: 42.3 % (ref 40–53)
HDLC SERPL-MCNC: 62 MG/DL (ref 23–92)
HGB BLD-MCNC: 14.4 G/DL (ref 13.3–17.7)
IMM GRANULOCYTES # BLD: 0 10E3/UL
IMM GRANULOCYTES NFR BLD: 0 %
LDLC SERPL CALC-MCNC: 96 MG/DL
LYMPHOCYTES # BLD AUTO: 0.9 10E3/UL (ref 0.8–5.3)
LYMPHOCYTES NFR BLD AUTO: 15 %
MCH RBC QN AUTO: 30.7 PG (ref 26.5–33)
MCHC RBC AUTO-ENTMCNC: 34 G/DL (ref 31.5–36.5)
MCV RBC AUTO: 90 FL (ref 78–100)
MONOCYTES # BLD AUTO: 0.6 10E3/UL (ref 0–1.3)
MONOCYTES NFR BLD AUTO: 10 %
NEUTROPHILS # BLD AUTO: 4.4 10E3/UL (ref 1.6–8.3)
NEUTROPHILS NFR BLD AUTO: 73 %
NONHDLC SERPL-MCNC: 122 MG/DL
NRBC # BLD AUTO: 0 10E3/UL
NRBC BLD AUTO-RTO: 0 /100
PLATELET # BLD AUTO: 190 10E3/UL (ref 150–450)
POTASSIUM BLD-SCNC: 4.4 MMOL/L (ref 3.5–5.1)
PROT SERPL-MCNC: 7.2 G/DL (ref 6.4–8.9)
PSA SERPL-MCNC: 7.81 UG/L (ref 0–4)
RBC # BLD AUTO: 4.69 10E6/UL (ref 4.4–5.9)
SODIUM SERPL-SCNC: 139 MMOL/L (ref 134–144)
TRIGL SERPL-MCNC: 131 MG/DL
WBC # BLD AUTO: 6.1 10E3/UL (ref 4–11)

## 2022-10-11 PROCEDURE — G0008 ADMIN INFLUENZA VIRUS VAC: HCPCS

## 2022-10-11 PROCEDURE — 99215 OFFICE O/P EST HI 40 MIN: CPT | Mod: 25 | Performed by: FAMILY MEDICINE

## 2022-10-11 PROCEDURE — G0439 PPPS, SUBSEQ VISIT: HCPCS | Performed by: FAMILY MEDICINE

## 2022-10-11 PROCEDURE — 85025 COMPLETE CBC W/AUTO DIFF WBC: CPT | Mod: ZL | Performed by: FAMILY MEDICINE

## 2022-10-11 PROCEDURE — 82040 ASSAY OF SERUM ALBUMIN: CPT | Mod: ZL | Performed by: FAMILY MEDICINE

## 2022-10-11 PROCEDURE — G0463 HOSPITAL OUTPT CLINIC VISIT: HCPCS | Mod: 25

## 2022-10-11 PROCEDURE — 84153 ASSAY OF PSA TOTAL: CPT | Mod: ZL | Performed by: FAMILY MEDICINE

## 2022-10-11 PROCEDURE — 91312 COVID-19,PF,PFIZER BOOSTER BIVALENT: CPT

## 2022-10-11 PROCEDURE — 80053 COMPREHEN METABOLIC PANEL: CPT | Mod: ZL | Performed by: FAMILY MEDICINE

## 2022-10-11 PROCEDURE — 80061 LIPID PANEL: CPT | Mod: ZL | Performed by: FAMILY MEDICINE

## 2022-10-11 PROCEDURE — 36415 COLL VENOUS BLD VENIPUNCTURE: CPT | Mod: ZL | Performed by: FAMILY MEDICINE

## 2022-10-11 RX ORDER — TAMSULOSIN HYDROCHLORIDE 0.4 MG/1
0.8 CAPSULE ORAL
Qty: 180 CAPSULE | Refills: 3 | Status: SHIPPED | OUTPATIENT
Start: 2022-10-11 | End: 2023-10-24

## 2022-10-11 RX ORDER — LISINOPRIL 40 MG/1
TABLET ORAL
Qty: 90 TABLET | Refills: 3 | Status: SHIPPED | OUTPATIENT
Start: 2022-10-11 | End: 2024-01-26

## 2022-10-11 RX ORDER — ROSUVASTATIN CALCIUM 20 MG/1
TABLET, COATED ORAL
Qty: 90 TABLET | Refills: 3 | Status: SHIPPED | OUTPATIENT
Start: 2022-10-11 | End: 2024-01-26

## 2022-10-11 RX ORDER — HYDROCHLOROTHIAZIDE 12.5 MG/1
TABLET ORAL
Qty: 90 TABLET | Refills: 3 | Status: SHIPPED | OUTPATIENT
Start: 2022-10-11 | End: 2023-09-05

## 2022-10-11 ASSESSMENT — PAIN SCALES - GENERAL: PAINLEVEL: NO PAIN (0)

## 2022-10-11 ASSESSMENT — ACTIVITIES OF DAILY LIVING (ADL): CURRENT_FUNCTION: NO ASSISTANCE NEEDED

## 2022-10-11 NOTE — PROGRESS NOTES
"SUBJECTIVE:   Chao is a 76 year old who presents for Preventive Visit.      Patient has been advised of split billing requirements and indicates understanding: Yes  Are you in the first 12 months of your Medicare coverage?  No    Healthy Habits:     In general, how would you rate your overall health?  Good    Frequency of exercise:  None    Do you usually eat at least 4 servings of fruit and vegetables a day, include whole grains    & fiber and avoid regularly eating high fat or \"junk\" foods?  Yes    Taking medications regularly:  Yes    Medication side effects:  Not applicable    Ability to successfully perform activities of daily living:  No assistance needed    Home Safety:  No safety concerns identified    Hearing Impairment:  No hearing concerns    In the past 6 months, have you been bothered by leaking of urine? Yes    In general, how would you rate your overall mental or emotional health?  Excellent      PHQ-2 Total Score: 0    Additional concerns today:  Yes    Do you feel safe in your environment? Yes    Have you ever done Advance Care Planning? (For example, a Health Directive, POLST, or a discussion with a medical provider or your loved ones about your wishes): Yes, patient states has an Advance Care Planning document and will bring a copy to the clinic.       Fall risk  Fallen 2 or more times in the past year?: No  Any fall with injury in the past year?: No    Cognitive Screening   1) Repeat 3 items (Leader, Season, Table)    2) Clock draw: ABNORMAL One of the clock hands in in the wrong place  3) 3 item recall: Recalls 1 object   Results: ABNORMAL clock, 1-2 items recalled: PROBABLE COGNITIVE IMPAIRMENT, **INFORM PROVIDER**    Mini-CogTM Copyright CARISSA Cheng. Licensed by the author for use in [facility]; reprinted with permission ([email or web address]). All rights reserved.      Do you have sleep apnea, excessive snoring or daytime drowsiness?: no    Reviewed and updated as needed this visit by " clinical staff   Tobacco  Allergies  Meds   Med Hx  Surg Hx  Fam Hx  Soc Hx        Reviewed and updated as needed this visit by Provider                 Social History     Tobacco Use     Smoking status: Former     Types: Cigarettes     Quit date: 1970     Years since quittin.8     Smokeless tobacco: Never     Tobacco comments:     smoked off and on for 2 years   Substance Use Topics     Alcohol use: Not Currently     Alcohol/week: 2.0 standard drinks     Types: 2 Cans of beer per week     Comment: 2 times a week/2 drinks     If you drink alcohol do you typically have >3 drinks per day or >7 drinks per week? No    Alcohol Use 10/10/2022   Prescreen: >3 drinks/day or >7 drinks/week? No   Review current opioid prescriptions    For a patient with a current opioid prescription:    Reviewed potential Opioid Use Disorder (OUD) risk factors:  NA    Evaluate their pain severity and current treatment plan:     Provide information on non-opioid treatment options:    Refer to a specialist, as appropriate:    Get more information on pain management in the [facility] Pain Management Best Practices Inter-agency Task Force Report    Screen for potential Substance Use Disorders (SUDs)    Reviewed the patient s potential risk factors for SUDs:  NA    Refer to treatment or specialist, as appropriate:     A screening tool isn t required but you may use one:  Find more information in the [place] on Drug Abuse Screening and Assessment Tools Chart  Answers for HPI/ROS submitted by the patient on 10/10/2022  PHQ9 TOTAL SCORE: 0  EDDIE 7 TOTAL SCORE: 0              Current providers sharing in care for this patient include:   Patient Care Team:  Chan Cook MD as PCP - General (Family Practice)  Chan Cook MD as Assigned PCP  Luis Miguel Lama MD as Assigned Musculoskeletal Provider    The following health maintenance items are reviewed in Epic and correct as of today:  Health Maintenance   Topic Date Due      "HEPATITIS B IMMUNIZATION (1 of 3 - 3-dose series) Never done     MEDICARE ANNUAL WELLNESS VISIT  08/03/2021     COVID-19 Vaccine (5 - Booster for Moderna series) 06/29/2022     INFLUENZA VACCINE (1) 09/01/2022     FALL RISK ASSESSMENT  10/11/2023     ADVANCE CARE PLANNING  08/03/2025     LIPID  09/21/2026     DTAP/TDAP/TD IMMUNIZATION (3 - Td or Tdap) 08/03/2030     PHQ-2 (once per calendar year)  Completed     Pneumococcal Vaccine: 65+ Years  Completed     ZOSTER IMMUNIZATION  Completed     IPV IMMUNIZATION  Aged Out     MENINGITIS IMMUNIZATION  Aged Out     HEPATITIS C SCREENING  Discontinued     COLORECTAL CANCER SCREENING  Discontinued     Labs reviewed in EPIC          Review of Systems      OBJECTIVE:   /62   Pulse 60   Temp 96.9  F (36.1  C) (Temporal)   Resp 16   Ht 1.715 m (5' 7.5\")   Wt 82.1 kg (181 lb)   SpO2 98%   BMI 27.93 kg/m   Estimated body mass index is 27.93 kg/m  as calculated from the following:    Height as of this encounter: 1.715 m (5' 7.5\").    Weight as of this encounter: 82.1 kg (181 lb).  Physical Exam          ASSESSMENT / PLAN:   Chao was seen today for medicare visit and imm/inj.    Diagnoses and all orders for this visit:    Medicare annual wellness visit, subsequent    Essential hypertension, benign  -     hydrochlorothiazide (HYDRODIURIL) 12.5 MG tablet; TAKE 1 TABLET EVERY DAY  -     lisinopril (ZESTRIL) 40 MG tablet; Take 1 tablet daily  -     CBC with Platelets & Differential; Future  -     Comprehensive metabolic panel; Future  -     Comprehensive metabolic panel  -     CBC with Platelets & Differential    Benign non-nodular prostatic hyperplasia with lower urinary tract symptoms  -     tamsulosin (FLOMAX) 0.4 MG capsule; Take 2 capsules (0.8 mg) by mouth daily with food    Mixed hyperlipidemia  -     rosuvastatin (CRESTOR) 20 MG tablet; TAKE 1 TABLET (20 MG) BY MOUTH AT BEDTIME  -     Comprehensive metabolic panel; Future  -     Lipid Profile; Future  -     Lipid " "Profile  -     Comprehensive metabolic panel    Needs flu shot  -     FLU SHOT 65+ (FLUZONE HD)    Brain injury with loss of consciousness, sequela (H)  -     CBC with Platelets & Differential; Future  -     Comprehensive metabolic panel; Future  -     Comprehensive metabolic panel  -     CBC with Platelets & Differential    Prostate disorder  -     PSA tumor marker; Future  -     PSA tumor marker    Bilateral arm weakness  -     Physical Therapy Referral; Future    Acquired pes planus of both feet  -     Miscellaneous Order for DME - ONLY FOR DME    High priority for 2019-nCoV vaccine  -     COVID-19,PF,PFIZER BOOSTER BIVALENT 12+Yrs        Patient has been advised of split billing requirements and indicates understanding: Yes    COUNSELING:  Reviewed preventive health counseling, as reflected in patient instructions       Regular exercise       Healthy diet/nutrition       Fall risk prevention       Colon cancer screening       Prostate cancer screening    Estimated body mass index is 27.93 kg/m  as calculated from the following:    Height as of this encounter: 1.715 m (5' 7.5\").    Weight as of this encounter: 82.1 kg (181 lb).        He reports that he quit smoking about 52 years ago. He has never used smokeless tobacco.      Appropriate preventive services were discussed with this patient, including applicable screening as appropriate for cardiovascular disease, diabetes, osteopenia/osteoporosis, and glaucoma.  As appropriate for age/gender, discussed screening for colorectal cancer, prostate cancer, breast cancer, and cervical cancer. Checklist reviewing preventive services available has been given to the patient.    Reviewed patients plan of care and provided an AVS. The Basic Care Plan (routine screening as documented in Health Maintenance) for Chao meets the Care Plan requirement. This Care Plan has been established and reviewed with the Patient and spouse.    Counseling Resources:  ATP IV " Guidelines  Pooled Cohorts Equation Calculator  Breast Cancer Risk Calculator  Breast Cancer: Medication to Reduce Risk  FRAX Risk Assessment  ICSI Preventive Guidelines  Dietary Guidelines for Americans, 2010  USDA's MyPlate  ASA Prophylaxis  Lung CA Screening    Chan Cook MD  [facility]    Identified Health Risks:

## 2022-10-11 NOTE — NURSING NOTE
"Chief Complaint   Patient presents with     Medicare Visit     annual       Initial /62   Pulse 60   Temp 96.9  F (36.1  C) (Temporal)   Resp 16   Ht 1.715 m (5' 7.5\")   Wt 82.1 kg (181 lb)   SpO2 98%   BMI 27.93 kg/m   Estimated body mass index is 27.93 kg/m  as calculated from the following:    Height as of this encounter: 1.715 m (5' 7.5\").    Weight as of this encounter: 82.1 kg (181 lb).  Medication Reconciliation: complete    FOOD SECURITY SCREENING QUESTIONS  Hunger Vital Signs:  Within the past 12 months we worried whether our food would run out before we got money to buy more. Never  Within the past 12 months the food we bought just didn't last and we didn't have money to get more. Never        Advance care directive on file? no  Advance care directive provided to patient? States he just started one     Mandy Mcfarlane LPN  "

## 2022-10-11 NOTE — PROGRESS NOTES
"Nursing Notes:   Mandy Mcfarlane LPN  10/11/2022 11:53 AM  Signed  Chief Complaint   Patient presents with     Medicare Visit     annual       Initial /62   Pulse 60   Temp 96.9  F (36.1  C) (Temporal)   Resp 16   Ht 1.715 m (5' 7.5\")   Wt 82.1 kg (181 lb)   SpO2 98%   BMI 27.93 kg/m   Estimated body mass index is 27.93 kg/m  as calculated from the following:    Height as of this encounter: 1.715 m (5' 7.5\").    Weight as of this encounter: 82.1 kg (181 lb).  Medication Reconciliation: complete    FOOD SECURITY SCREENING QUESTIONS  Hunger Vital Signs:  Within the past 12 months we worried whether our food would run out before we got money to buy more. Never  Within the past 12 months the food we bought just didn't last and we didn't have money to get more. Never        Advance care directive on file? no  Advance care directive provided to patient? States he just started one     Mandy Mcfarlane LPN      SUBJECTIVE:  Chao Kearney  is a 76 year old male who comes in today for Medicare wellness and follow-up of his other medical problems.  He had total hip arthroplasty done by Dr. Lama in May and did well.  He had some lower right calf soreness. He has orthotics.  They are the original ones he got years ago.     He has hypertension for which he takes lisinopril 40 mg daily and hydrochlorothiazide 12.5 mg daily.  He is on rosuvastatin 20 mg daily and Flomax 0.4 mg daily.  We have been following his PSA.  We have talked about his age and the fact that this is probably not going to be necessary ongoing but he like to continue to follow at.  He has had slightly elevated PSA but has been stable over many years.    He is an avid bow louisa. He can't draw his bow back.  He decreased him from 60 to 55 draw.    He had normal colonoscopy in 2015 and will not need another one.    He is vaccinated and boosted for COVID-19.  He has not had the new booster.  He typically gets a flu shot every year and is due for " that.  He has had Shingrix Pneumovax and is up-to-date on Boostrix.    Past Medical, Family, and Social History reviewed and updated as noted below.   ROS is negative except as noted above       Allergies   Allergen Reactions     Fluress Unknown   ,   Family History   Problem Relation Age of Onset     Cancer Mother 78        Cancer, lung cancer at age 78, developed brain metastases     Heart Disease Father 50        Heart Disease,heart failure     Other - See Comments Father          MS, long-standing     Cancer Paternal Grandfather         Cancer, of cancer of unknown type     Other - See Comments Sister          MS   ,   Current Outpatient Medications   Medication     acetaminophen (TYLENOL) 325 MG tablet     aspirin 81 MG EC tablet     Cholecalciferol (VITAMIN D3) 1000 UNITS CAPS     hydrochlorothiazide (HYDRODIURIL) 12.5 MG tablet     hydrOXYzine (ATARAX) 10 MG tablet     lisinopril (ZESTRIL) 40 MG tablet     meclizine (ANTIVERT) 12.5 MG tablet     Multiple Vitamins-Minerals (MULTILEX-T&M) TABS     rosuvastatin (CRESTOR) 20 MG tablet     tamsulosin (FLOMAX) 0.4 MG capsule     triamcinolone (KENALOG) 0.1 % external cream     oxyCODONE (ROXICODONE) 5 MG tablet     senna-docusate (SENOKOT-S/PERICOLACE) 8.6-50 MG tablet     No current facility-administered medications for this visit.   ,   Past Medical History:   Diagnosis Date     Actinic keratosis     No Comments Provided     Anemia     No Comments Provided     Diverticulosis of large intestine without perforation or abscess without bleeding     No Comments Provided     Elevated prostate specific antigen (PSA)     No Comments Provided     Essential (primary) hypertension     No Comments Provided     Generalized anxiety disorder     No Comments Provided     History of colonic polyps     No Comments Provided     Hydrocephalus (H)     (obstructive)     Hyperlipidemia     No Comments Provided     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)     No Comments  Provided     Lesion of oral mucosa     Floor of mouth lesion, lower mandible lesion, evaluation Dr. Marin 1/24/05     Nontraumatic subarachnoid hemorrhage (H)     No Comments Provided     Osteoarthritis of hip     No Comments Provided     Other seborrheic keratosis     No Comments Provided     Other specified disorders of bone, unspecified site (CODE)     1/05,Lingual sean, bony growth, lower mandible, evaluation Dr. Marin 1/05     Pain in right hip     No Comments Provided     Personal history of other medical treatment (CODE)     05/16/05,Q wave in 3 and AVF leads on electrocardiogram done     Plantar fascial fibromatosis     No Comments Provided     Retention of urine     No Comments Provided     Screening for other and unspecified cardiovascular conditions     05/20/2005,Stress echocardiogram is negative for ischemia with ejection fraction of 85%     Stiffness of unspecified joint, not elsewhere classified     No Comments Provided   ,   Patient Active Problem List    Diagnosis Date Noted     Generalized anxiety disorder 01/17/2018     Priority: Medium     Elevated prostate specific antigen (PSA) 01/17/2018     Priority: Medium     Overview:   in October 2005.  The patient is taking Levaquin daily for two months.  He   will have another PSA checked in October 2006, recommended by Dr. Wilson.       Hyperlipidemia 01/17/2018     Priority: Medium     Hypertension 01/17/2018     Priority: Medium     Plantar fasciitis 01/17/2018     Priority: Medium     Overview:   right       Benign non-nodular prostatic hyperplasia with lower urinary tract symptoms 07/27/2016     Priority: Medium     Brain injury 01/16/2015     Priority: Medium     Retention of urine 12/12/2014     Priority: Medium     Right hip pain 10/10/2014     Priority: Medium     Anemia 09/22/2014     Priority: Medium     Hydrocephalus (H) 09/22/2014     Priority: Medium     SAH (subarachnoid hemorrhage) (H) 09/22/2014     Priority: Medium     Actinic  "keratosis 2014     Priority: Medium     Seborrheic keratosis 2014     Priority: Medium     H/O adenomatous polyp of colon 2010     Priority: Medium     Diverticulosis of sigmoid colon 2010     Priority: Medium   ,   Past Surgical History:   Procedure Laterality Date     ARTHROPLASTY HIP ANTERIOR Right 2022    Procedure: ARTHROPLASTY, HIP, TOTAL, DIRECT ANTERIOR APPROACH;  Surgeon: Luis Miguel Lama MD;  Location: [facility]     COLONOSCOPY  2010    (2010),F/U      COLONOSCOPY  10/15/2015    10/15/15,F/U      EXTRACAPSULAR CATARACT EXTRATION WITH INTRAOCULAR LENS IMPLANT           OTHER SURGICAL HISTORY      3/26/14,61714.0,[place] TOTAL HIP ARTHROPLASTY,Left,direct anterior approach, Dr. Lama     OTHER SURGICAL HISTORY      ,[id number],CRANIOTOMY     OTHER SURGICAL HISTORY      2016,,HERNIA REPAIR,Left,LIH with mesh    and   Social History     Tobacco Use     Smoking status: Former     Types: Cigarettes     Quit date: 1970     Years since quittin.8     Smokeless tobacco: Never     Tobacco comments:     smoked off and on for 2 years   Substance Use Topics     Alcohol use: Not Currently     Alcohol/week: 2.0 standard drinks     Types: 2 Cans of beer per week     Comment: 2 times a week/2 drinks     OBJECTIVE:  /62   Pulse 60   Temp 96.9  F (36.1  C) (Temporal)   Resp 16   Ht 1.715 m (5' 7.5\")   Wt 82.1 kg (181 lb)   SpO2 98%   BMI 27.93 kg/m     EXAM:  General Appearance: Pleasant, alert, appropriate appearance for age. No acute distress  Head Exam: Normal. Normocephalic, atraumatic.  Eye Exam:  Normal external eyes, conjunctivae, lids, cornea. MINDY. EOMI  Ear Exam: Normal TM's bilaterally. Normal auditory canals and external ears. Non-tender.  Nose Exam: Normal external nose, mucus membranes, and septum.  OroPharynx Exam:  Dental hygiene adequate. Normal buccal mucosa. Normal pharynx.  Neck Exam:  Supple, no masses or nodes. No audible " bruits  Thyroid Exam: No nodules or enlargement.  Chest/Respiratory Exam: Normal chest wall and respirations. Clear to auscultation.  Cardiovascular Exam: Regular rate and rhythm. S1, S2, no murmur, click, gallop, or rubs.  Gastrointestinal Exam: Soft, non-tender, no masses or organomegaly.  Lymphatic Exam: Non-palpable nodes in neck, clavicular regions.  Musculoskeletal Exam: Back is straight and non-tender, full ROM of upper and lower extremities.  Foot Exam: Left and right foot: good pedal pulses  Skin: no rash or abnormalities  Neurologic Exam: Nonfocal, normal gross motor, tone coordination and no tremor.  Psychiatric Exam: Alert and oriented - appropriate affect.     Results for orders placed or performed in visit on 10/11/22   PSA tumor marker     Status: Abnormal   Result Value Ref Range    PSA Tumor Marker 7.81 (H) 0.00 - 4.00 ug/L    Narrative    The [employer] Access PSAS WHO assay is a two site immunoenzymatic   assay. Assay values obtained with different assay methods cannot be used   interchangeably due to differences in assay methods and reagent specificity.   Lipid Profile     Status: None   Result Value Ref Range    Cholesterol 184 <200 mg/dL    Triglycerides 131 <150 mg/dL    Direct Measure HDL 62 23 - 92 mg/dL    LDL Cholesterol Calculated 96 <=100 mg/dL    Non HDL Cholesterol 122 <130 mg/dL    Patient Fasting > 8hrs? Unknown     Narrative    Cholesterol  Desirable:  <200 mg/dL    Triglycerides  Normal:  Less than 150 mg/dL  Borderline High:  150-199 mg/dL  High:  200-499 mg/dL  Very High:  Greater than or equal to 500 mg/dL    Direct Measure HDL  Female:  Greater than or equal to 50 mg/dL   Male:  Greater than or equal to 40 mg/dL    LDL Cholesterol  Desirable:  <100mg/dL  Above Desirable:  100-129 mg/dL   Borderline High:  130-159 mg/dL   High:  160-189 mg/dL   Very High:  >= 190 mg/dL    Non HDL Cholesterol  Desirable:  130 mg/dL  Above Desirable:  130-159 mg/dL  Borderline High:  160-189 mg/dL  High:   190-219 mg/dL  Very High:  Greater than or equal to 220 mg/dL   Comprehensive metabolic panel     Status: Abnormal   Result Value Ref Range    Sodium 139 134 - 144 mmol/L    Potassium 4.4 3.5 - 5.1 mmol/L    Chloride 103 98 - 107 mmol/L    Carbon Dioxide (CO2) 32 (H) 21 - 31 mmol/L    Anion Gap 4 3 - 14 mmol/L    Urea Nitrogen 17 7 - 25 mg/dL    Creatinine 1.08 0.70 - 1.30 mg/dL    Calcium 9.8 8.6 - 10.3 mg/dL    Glucose 93 70 - 105 mg/dL    Alkaline Phosphatase 53 34 - 104 U/L    AST 23 13 - 39 U/L    ALT 15 7 - 52 U/L    Protein Total 7.2 6.4 - 8.9 g/dL    Albumin 4.3 3.5 - 5.7 g/dL    Bilirubin Total 0.5 0.3 - 1.0 mg/dL    GFR Estimate 71 >60 mL/min/1.73m2   CBC with platelets and differential     Status: None   Result Value Ref Range    WBC Count 6.1 4.0 - 11.0 10e3/uL    RBC Count 4.69 4.40 - 5.90 10e6/uL    Hemoglobin 14.4 13.3 - 17.7 g/dL    Hematocrit 42.3 40.0 - 53.0 %    MCV 90 78 - 100 fL    MCH 30.7 26.5 - 33.0 pg    MCHC 34.0 31.5 - 36.5 g/dL    RDW 13.0 10.0 - 15.0 %    Platelet Count 190 150 - 450 10e3/uL    % Neutrophils 73 %    % Lymphocytes 15 %    % Monocytes 10 %    % Eosinophils 1 %    % Basophils 1 %    % Immature Granulocytes 0 %    NRBCs per 100 WBC 0 <1 /100    Absolute Neutrophils 4.4 1.6 - 8.3 10e3/uL    Absolute Lymphocytes 0.9 0.8 - 5.3 10e3/uL    Absolute Monocytes 0.6 0.0 - 1.3 10e3/uL    Absolute Eosinophils 0.1 0.0 - 0.7 10e3/uL    Absolute Basophils 0.0 0.0 - 0.2 10e3/uL    Absolute Immature Granulocytes 0.0 <=0.4 10e3/uL    Absolute NRBCs 0.0 10e3/uL   CBC with Platelets & Differential     Status: None    Narrative    The following orders were created for panel order CBC with Platelets & Differential.  Procedure                               Abnormality         Status                     ---------                               -----------         ------                     CBC with platelets and d...[[id number]]                      Final result                 Please view results  for these tests on the individual orders.      ASSESSMENT/Plan :    Chao was seen today for medicare visit and imm/inj.    Diagnoses and all orders for this visit:    Medicare annual wellness visit, subsequent    Essential hypertension, benign  -     hydrochlorothiazide (HYDRODIURIL) 12.5 MG tablet; TAKE 1 TABLET EVERY DAY  -     lisinopril (ZESTRIL) 40 MG tablet; Take 1 tablet daily  -     CBC with Platelets & Differential; Future  -     Comprehensive metabolic panel; Future  -     Comprehensive metabolic panel  -     CBC with Platelets & Differential    Benign non-nodular prostatic hyperplasia with lower urinary tract symptoms  -     tamsulosin (FLOMAX) 0.4 MG capsule; Take 2 capsules (0.8 mg) by mouth daily with food    Mixed hyperlipidemia  -     rosuvastatin (CRESTOR) 20 MG tablet; TAKE 1 TABLET (20 MG) BY MOUTH AT BEDTIME  -     Comprehensive metabolic panel; Future  -     Lipid Profile; Future  -     Lipid Profile  -     Comprehensive metabolic panel    Needs flu shot  -     FLU SHOT 65+ (FLUZONE HD)    Brain injury with loss of consciousness, sequela (H)  -     CBC with Platelets & Differential; Future  -     Comprehensive metabolic panel; Future  -     Comprehensive metabolic panel  -     CBC with Platelets & Differential    Prostate disorder  -     PSA tumor marker; Future  -     PSA tumor marker    Bilateral arm weakness  -     Physical Therapy Referral; Future    Acquired pes planus of both feet  -     Miscellaneous Order for DME - ONLY FOR DME    High priority for 2019-nCoV vaccine  -     COVID-19,PF,PFIZER BOOSTER BIVALENT 12+Yrs      Will notify of lab results when available. Discussed diet, exercise and healthy lifestyle changes. Continue current medications.  Discussed referral to physical therapy because of some loss of upper body strength.    Ordered for orthotic review by Baudilio Mckoy to see if he needs any adjustments.    COVID and flu vaccines given today.    We have been watching a  "minimally elevated PSA and I think is still in the range that is okay.  We discussed the utility of whether or not we should continue to check this.  He would like to do it just because its been minimally elevated.  Certainly at this level I do not think we need to do anything further.  He is very vital active gentleman and is biologically much less advanced than his chronologic age.    A total of 42 minutes was spent with the patient, reviewing records, tests, ordering medications, tests or procedures and documenting clinical information in the EHR in addition to Medicare Wellness.     Chan Cook MD            Answers for HPI/ROS submitted by the patient on 10/10/2022  PHQ9 TOTAL SCORE: 0  EDDIE 7 TOTAL SCORE: 0  In general, how would you rate your overall physical health?: good  Frequency of exercise:: None  Do you usually eat at least 4 servings of fruit and vegetables a day, include whole grains & fiber, and avoid regularly eating high fat or \"junk\" foods? : Yes  Taking medications regularly:: Yes  Medication side effects:: Not applicable  Activities of Daily Living: no assistance needed  Home safety: no safety concerns identified  Hearing Impairment:: no hearing concerns  In the past 6 months, have you been bothered by leaking of urine?: Yes  abdominal pain: No  Blood in stool: No  Blood in urine: No  chest pain: No  chills: No  congestion: No  constipation: No  cough: No  diarrhea: No  dizziness: Yes  ear pain: No  eye pain: No  nervous/anxious: No  fever: No  frequency: No  genital sores: No  headaches: No  hearing loss: No  heartburn: No  arthralgias: No  joint swelling: No  peripheral edema: No  mood changes: No  myalgias: No  nausea: No  dysuria: No  palpitations: No  Skin sensation changes: No  sore throat: No  urgency: Yes  rash: Yes  shortness of breath: No  visual disturbance: No  weakness: No  impotence: Yes  penile discharge: No  In general, how would you rate your overall mental or emotional " health?: excellent  Additional concerns today:: Yes

## 2022-10-25 ENCOUNTER — OFFICE VISIT (OUTPATIENT)
Dept: FAMILY MEDICINE | Facility: OTHER | Age: 77
End: 2022-10-25
Attending: FAMILY MEDICINE
Payer: COMMERCIAL

## 2022-10-25 VITALS
RESPIRATION RATE: 16 BRPM | BODY MASS INDEX: 28.08 KG/M2 | OXYGEN SATURATION: 98 % | SYSTOLIC BLOOD PRESSURE: 130 MMHG | TEMPERATURE: 96 F | HEART RATE: 58 BPM | WEIGHT: 182 LBS | DIASTOLIC BLOOD PRESSURE: 80 MMHG

## 2022-10-25 DIAGNOSIS — Z96.643 HISTORY OF BILATERAL HIP ARTHROPLASTY: Primary | ICD-10-CM

## 2022-10-25 DIAGNOSIS — M21.41 ACQUIRED PES PLANUS OF BOTH FEET: ICD-10-CM

## 2022-10-25 DIAGNOSIS — M79.661 RIGHT CALF PAIN: ICD-10-CM

## 2022-10-25 DIAGNOSIS — M21.42 ACQUIRED PES PLANUS OF BOTH FEET: ICD-10-CM

## 2022-10-25 PROCEDURE — 99213 OFFICE O/P EST LOW 20 MIN: CPT | Performed by: FAMILY MEDICINE

## 2022-10-25 PROCEDURE — G0463 HOSPITAL OUTPT CLINIC VISIT: HCPCS | Mod: 25

## 2022-10-25 PROCEDURE — G0463 HOSPITAL OUTPT CLINIC VISIT: HCPCS

## 2022-10-25 ASSESSMENT — ENCOUNTER SYMPTOMS
FEVER: 0
SINUS PAIN: 0
NUMBNESS: 0
PARESTHESIAS: 0
SINUS PRESSURE: 0
NAUSEA: 0
BACK PAIN: 0
NECK STIFFNESS: 0
CHOKING: 0
PALPITATIONS: 0
CHEST TIGHTNESS: 0
DIARRHEA: 0
ACTIVITY CHANGE: 1
HEADACHES: 0
APNEA: 0
FATIGUE: 0
CONSTIPATION: 0
MYALGIAS: 1
PSYCHIATRIC NEGATIVE: 1
NECK PAIN: 0

## 2022-10-25 ASSESSMENT — PAIN SCALES - GENERAL: PAINLEVEL: MILD PAIN (3)

## 2022-10-25 NOTE — PROGRESS NOTES
Assessment & Plan       ICD-10-CM    1. History of bilateral hip arthroplasty  Z96.643 Physical Therapy Referral      2. Right calf pain  M79.661 Physical Therapy Referral      3. Acquired pes planus of both feet  M21.41 [facility] DME Orthotic; Diabetic Shoe(s)/Insert(s); 3 pair; Progress note must support the need for shoes/orthotics. Use .diabeticfootexam or diabetic foot exam picklist in [facility].    M21.42          Increased muscle tightness and discomfort in R lower leg distal to lateral gastrocnemius, R lateral thigh 2 inches below greater trochanter, and R medial groin. Symptom onset after R hip replacement in May. He notices increased tightness and discomfort after increased activity that is relieved by ice packs and/or biofreeze. Patient has physical therapy scheduled for this issue starting next week. Current presentation likely due to MSK misalignment after hip replacement. Less likely from stress fracture, muscle/ligament tear, neuropathy, DVT, or infection. Patient was concerned about these causes for lower leg discomfort and was reassured by assessment. No imaging appears indicated. Patient to start physical therapy next week; Presentation likely MSK misalignment and physical therapy could be very helpful.    Has had current orthotics since before hip surgery and for 15-20 years. Bilateral hip surgeries could have altered alignment and old orthotics could be inappropriate now.   Referral to [facility] for new orthotics due to pes planus.    Patient to return to clinic if sensation persists after multiple PT sessions and orthotic replacement.        CONSULTATION/REFERRAL to physical therapy and orthotics appointment.     No follow-ups on file.    Benjamin Smalls MD  [facility]    [place]   Chao is a 76 year old accompanied by his spouse, presenting for the following health issues:  Musculoskeletal Problem    R hip replacement in May, noticed R lateral  leg pain started after. He has used orthotics for many years and they have been helpful for him in the past. He is hoping to be reevaluated for orthotics. Patient has not been able to walk his normal 1-3 miles per day due to discomfort in R lateral lower leg, R thigh 2 inches below greater trochanter along IT band, and R groin. He completed PT after his hip replacement and felt great while doing this. His problems started after discontinuing PT. He will be starting PT again next week.     Musculoskeletal Problem  Associated symptoms include myalgias. Pertinent negatives include no chest pain, congestion, fatigue, fever, headaches, nausea, neck pain or numbness.   History of Present Illness       Reason for visit:  Pain in right calf that has been there for several weeks beginning sometime after my hip replacement in May    He eats 2-3 servings of fruits and vegetables daily.He consumes 3 sweetened beverage(s) daily.He exercises with enough effort to increase his heart rate 9 or less minutes per day.  He exercises with enough effort to increase his heart rate 4 days per week.   He is taking medications regularly.             Review of Systems   Constitutional: Positive for activity change. Negative for fatigue and fever.        Patient not able to walk his normal 1-3 miles per day.    HENT: Negative for congestion, sinus pressure, sinus pain, sneezing and tinnitus.    Respiratory: Negative for apnea, choking and chest tightness.    Cardiovascular: Negative for chest pain, palpitations and peripheral edema.   Gastrointestinal: Negative for constipation, diarrhea and nausea.   Genitourinary: Negative.    Musculoskeletal: Positive for gait problem and myalgias. Negative for back pain, neck pain and neck stiffness.   Neurological: Negative for numbness, headaches and paresthesias.   Psychiatric/Behavioral: Negative.             Objective    /80 (BP Location: Right arm, Patient Position: Sitting, Cuff Size: Adult  Large)   Pulse 58   Temp (!) 96  F (35.6  C) (Tympanic)   Resp 16   Wt 82.6 kg (182 lb)   SpO2 98%   BMI 28.08 kg/m    Body mass index is 28.08 kg/m .  Physical Exam  Constitutional:       Appearance: Normal appearance.   Eyes:      Extraocular Movements: Extraocular movements intact.      Conjunctiva/sclera: Conjunctivae normal.   Cardiovascular:      Rate and Rhythm: Normal rate and regular rhythm.      Pulses: Normal pulses.      Heart sounds: Normal heart sounds.   Pulmonary:      Effort: Pulmonary effort is normal.      Breath sounds: Normal breath sounds.   Musculoskeletal:         General: Tenderness present.      Cervical back: Normal range of motion and neck supple.      Comments: Discomfort in R lateral lower leg, 2 inches below R greater trochanter along IT band, and R groin.    Skin:     General: Skin is warm and dry.   Neurological:      Mental Status: He is alert.   Psychiatric:         Mood and Affect: Mood normal.         Behavior: Behavior normal.         Thought Content: Thought content normal.         Judgment: Judgment normal.              Donald Vanceponce on 10/25/2022 at 2:35 PM   Physician Attestation   I, Benjamin Smalls MD, was present with the medical/DARIANA student who participated in the service and in the documentation of the note.  I have verified the history and personally performed the physical exam and medical decision making.  I agree with the assessment and plan of care as documented in the note.      Items personally reviewed: vitals.    Benjamin Smalls MD

## 2022-10-25 NOTE — NURSING NOTE
"Patient presents to the clinic for right lower leg disconfort.    FOOD SECURITY SCREENING QUESTIONS:    The next two questions are to help us understand your food security.  If you are feeling you need any assistance in this area, we have resources available to support you today.    Hunger Vital Signs:  Within the past 12 months we worried whether our food would run out before we got money to buy more. Never  Within the past 12 months the food we bought just didn't last and we didn't have money to get more. Never    Advance Care Directive on file? no  Advance Care Directive provided to patient? Declined-dropped off legal documents recently.    Chief Complaint   Patient presents with     Musculoskeletal Problem       Initial /80 (BP Location: Right arm, Patient Position: Sitting, Cuff Size: Adult Large)   Pulse 58   Temp (!) 96  F (35.6  C) (Tympanic)   Resp 16   Wt 82.6 kg (182 lb)   SpO2 98%   BMI 28.08 kg/m   Estimated body mass index is 28.08 kg/m  as calculated from the following:    Height as of 10/11/22: 1.715 m (5' 7.5\").    Weight as of this encounter: 82.6 kg (182 lb).  Medication Reconciliation: complete        Maia Rosas LPN       "

## 2022-11-07 ENCOUNTER — MYC MEDICAL ADVICE (OUTPATIENT)
Dept: FAMILY MEDICINE | Facility: OTHER | Age: 77
End: 2022-11-07
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
## 2022-11-07 RX ORDER — DOXYCYCLINE 100 MG/1
200 CAPSULE ORAL ONCE
Qty: 2 CAPSULE | Refills: 0 | Status: SHIPPED | OUTPATIENT
Start: 2022-11-07 | End: 2022-11-07

## 2022-11-07 NOTE — TELEPHONE ENCOUNTER
Please forward to triage nurse. Tick related questions go through them.     Appropriate for one dose of prophylactic doxy. Please review signs and symptoms of tick disease, reasons to come in for evaluation.     Adry Gonzales MD

## 2022-11-07 NOTE — TELEPHONE ENCOUNTER
Tick Bite. Wood tick, unknown attachment, Found last evening, got the whole head out, gorged with blood, Denies nausea, vomiting or diarrhea, area is itchy, redness around bite area is over 1/2 inch around, no bleeding, Educated if area gets warm to the touch, increased redness, he develops a fever, chills, ect. To call back. Updated him that [facility]. He verbalized understanding. Talisha Josue RN on 11/7/2022 at 9:03 AM

## 2022-12-28 ENCOUNTER — MYC MEDICAL ADVICE (OUTPATIENT)
Dept: FAMILY MEDICINE | Facility: OTHER | Age: 77
End: 2022-12-28

## 2023-01-11 ENCOUNTER — OFFICE VISIT (OUTPATIENT)
Dept: FAMILY MEDICINE | Facility: OTHER | Age: 78
End: 2023-01-11
Attending: FAMILY MEDICINE
Payer: COMMERCIAL

## 2023-01-11 ENCOUNTER — MYC MEDICAL ADVICE (OUTPATIENT)
Dept: FAMILY MEDICINE | Facility: OTHER | Age: 78
End: 2023-01-11

## 2023-01-11 VITALS
TEMPERATURE: 97 F | WEIGHT: 188.6 LBS | HEART RATE: 60 BPM | BODY MASS INDEX: 29.1 KG/M2 | OXYGEN SATURATION: 99 % | RESPIRATION RATE: 16 BRPM | SYSTOLIC BLOOD PRESSURE: 130 MMHG | DIASTOLIC BLOOD PRESSURE: 72 MMHG

## 2023-01-11 DIAGNOSIS — T24.209A PARTIAL THICKNESS BURN OF LOWER EXTREMITY, UNSPECIFIED LATERALITY, INITIAL ENCOUNTER: Primary | ICD-10-CM

## 2023-01-11 PROCEDURE — G0463 HOSPITAL OUTPT CLINIC VISIT: HCPCS

## 2023-01-11 PROCEDURE — 99213 OFFICE O/P EST LOW 20 MIN: CPT | Performed by: FAMILY MEDICINE

## 2023-01-11 ASSESSMENT — ANXIETY QUESTIONNAIRES
5. BEING SO RESTLESS THAT IT IS HARD TO SIT STILL: NOT AT ALL
4. TROUBLE RELAXING: NOT AT ALL
1. FEELING NERVOUS, ANXIOUS, OR ON EDGE: NOT AT ALL
3. WORRYING TOO MUCH ABOUT DIFFERENT THINGS: NOT AT ALL
8. IF YOU CHECKED OFF ANY PROBLEMS, HOW DIFFICULT HAVE THESE MADE IT FOR YOU TO DO YOUR WORK, TAKE CARE OF THINGS AT HOME, OR GET ALONG WITH OTHER PEOPLE?: NOT DIFFICULT AT ALL
GAD7 TOTAL SCORE: 0
6. BECOMING EASILY ANNOYED OR IRRITABLE: NOT AT ALL
7. FEELING AFRAID AS IF SOMETHING AWFUL MIGHT HAPPEN: NOT AT ALL
IF YOU CHECKED OFF ANY PROBLEMS ON THIS QUESTIONNAIRE, HOW DIFFICULT HAVE THESE PROBLEMS MADE IT FOR YOU TO DO YOUR WORK, TAKE CARE OF THINGS AT HOME, OR GET ALONG WITH OTHER PEOPLE: NOT DIFFICULT AT ALL
GAD7 TOTAL SCORE: 0
7. FEELING AFRAID AS IF SOMETHING AWFUL MIGHT HAPPEN: NOT AT ALL
2. NOT BEING ABLE TO STOP OR CONTROL WORRYING: NOT AT ALL

## 2023-01-11 ASSESSMENT — PAIN SCALES - GENERAL: PAINLEVEL: NO PAIN (0)

## 2023-01-11 ASSESSMENT — ENCOUNTER SYMPTOMS: BURN: 1
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
## 2023-01-31 ENCOUNTER — MYC MEDICAL ADVICE (OUTPATIENT)
Dept: FAMILY MEDICINE | Facility: OTHER | Age: 78
End: 2023-01-31
Payer: COMMERCIAL

## 2023-01-31 DIAGNOSIS — R42 VERTIGO: ICD-10-CM

## 2023-01-31 RX ORDER — MECLIZINE HCL 12.5 MG 12.5 MG/1
TABLET ORAL
Qty: 20 TABLET | Refills: 3 | Status: SHIPPED | OUTPATIENT
Start: 2023-01-31 | End: 2024-04-17

## 2023-05-03 ENCOUNTER — NURSE TRIAGE (OUTPATIENT)
Dept: FAMILY MEDICINE | Facility: OTHER | Age: 78
End: 2023-05-03
Payer: COMMERCIAL

## 2023-05-03 ENCOUNTER — MYC MEDICAL ADVICE (OUTPATIENT)
Dept: FAMILY MEDICINE | Facility: OTHER | Age: 78
End: 2023-05-03
Payer: COMMERCIAL
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
## 2023-05-03 RX ORDER — DOXYCYCLINE HYCLATE 100 MG
TABLET ORAL
Qty: 12 TABLET | Refills: 0 | Status: SHIPPED | OUTPATIENT
Start: 2023-05-03 | End: 2023-06-14

## 2023-05-03 NOTE — TELEPHONE ENCOUNTER
Refer to my chart messages. Message left for patient requesting a return call to triage. Will route my chart message to PCP in the interim for consideration. Darling Bush RN on 5/3/2023 at 11:49 AM

## 2023-05-03 NOTE — TELEPHONE ENCOUNTER
"Contacted patient and informed of Provider's response. Patient verbalized understanding and intent to comply. \"Thank you so much. I understand. I will come in if I get a fever, headache or rash and if I or the area gets worse\". Darling Bush RN on 5/3/2023 at 2:54 PM      doxycycline hyclate (VIBRA-TABS) 100 MG tablet 12 tablet 0 5/3/2023  --   Si tab po once for embedded tick.   Sent to pharmacy as: Doxycycline Hyclate 100 MG Oral Tablet (VIBRA-TABS)   Class: E-Prescribe   Notes to Pharmacy: Extra pills for subsequent exposures.   Order: [id number]   E-Prescribing Status: Receipt confirmed by pharmacy (5/3/2023  2:35 PM CDT)       "

## 2023-05-03 NOTE — TELEPHONE ENCOUNTER
"S-(situation): Per My chart  Good Morning Dr. Cook,  I just found a woodtick embeded in my upper thigh.  Do I need to take the anti-woodtick medicine?  If so,  I can pick it up at [facility].  Good grief!  It isn't even summer yet!   Thanks, Chao Dover,  Excuse me.  I call all of the ticks \"wood ticks\".  I think it was a deer tick though as I went to the site \"[employer]\" and the tick looked just like the third one that came up on the picture under Female Adult Stage Black Legged Tick, so I think it was a deer tick.  It was quite swollen and the bite is very red.  I think it had been there for a couple of days.  Should we wait a little longer and see if it gets worse?  Thanks, mckenzie RUIZ-(assessment): Deer Tick    Engorged. I am pretty sure we got it all.  I have no idea how long it was attached. We were out in the woods the day before yesterday.  Red, 1/2 inch red Crow with a little white dot in the middle of it. The dot is really little- like the size of a pin- prick. no pain, no fever. No rash     R-(recommendations): Protocol recommends an OV today. \"Well if we have to come in we want to only see Dr. Cook. Can you run this by and see if I should have an antibiotic? [facility]     Pt requests physician consideration and a callback today please    Darling Bush RN on 5/3/2023 at 1:26 PM      Reason for Disposition    Probable deer tick that was attached > 36 hours (or tick appears swollen, not flat)    Additional Information    Negative: Not a tick bite    Negative: Patient sounds very sick or weak to the triager    Negative: Fever or severe headache occurs, 2 to 14 days following the bite    Negative: Widespread rash occurs, 2 to 14 days following the bite    Negative: Can't remove live tick (after using Care Advice)    Negative: Fever and spreading red area or streak    Negative: Fever and area is very tender to touch    Negative: Red streak or red line and length > 2 inches (5 cm)    " "Negative: Red or very tender (to touch) area and started over 24 hours after the bite    Negative: Red ring or bull's-eye rash occurs around a deer tick bite    Answer Assessment - Initial Assessment Questions  1. TYPE of TICK: \"Is it a wood tick or a deer tick?\" (e.g., deer tick, wood tick; unsure)     Deer Tick   2. SIZE of TICK: \"How big is the tick?\" (e.g., size of poppy seed, apple seed, watermelon seed; unsure) Note: Deer ticks can be the size of a poppy seed (nymph) or an apple seed (adult).        Hard to tell   3. ENGORGED: \"Did the tick look flat or engorged (full, swollen)?\" (e.g., flat, engorged; unsure)      Engorged. I am pretty sure we got it all  4. LOCATION: \"Where is the tick bite located?\"       Left upper thigh- about 12 - 13 inches above the knee  5. ONSET: \"How long do you think the tick was attached before you removed it?\" (e.g., 5 hours, 2 days)       I have no idea. We were out in the woods the day before yesterday  6. APPEARANCE of BITE or RASH: \"What does the site look like?\"      Red, 1/2 inch red [place] with a little white dot in the middle of it. The dot is really little- like the size of a pin- prick. no pain, no fever   7. PREGNANCY: \"Is there any chance you are pregnant?\" \"When was your last menstrual period?\"      na    Protocols used: TICK BITE-A-OH    "

## 2023-05-24 ENCOUNTER — OFFICE VISIT (OUTPATIENT)
Dept: FAMILY MEDICINE | Facility: OTHER | Age: 78
End: 2023-05-24
Attending: FAMILY MEDICINE
Payer: COMMERCIAL

## 2023-05-24 ENCOUNTER — MYC MEDICAL ADVICE (OUTPATIENT)
Dept: FAMILY MEDICINE | Facility: OTHER | Age: 78
End: 2023-05-24

## 2023-05-24 VITALS
WEIGHT: 185.6 LBS | HEART RATE: 52 BPM | BODY MASS INDEX: 28.64 KG/M2 | OXYGEN SATURATION: 97 % | RESPIRATION RATE: 18 BRPM | SYSTOLIC BLOOD PRESSURE: 114 MMHG | DIASTOLIC BLOOD PRESSURE: 58 MMHG | TEMPERATURE: 97.2 F

## 2023-05-24 DIAGNOSIS — M79.661 PAIN OF RIGHT LOWER LEG: ICD-10-CM

## 2023-05-24 DIAGNOSIS — M79.661 RIGHT CALF PAIN: Primary | ICD-10-CM

## 2023-05-24 PROCEDURE — G0463 HOSPITAL OUTPT CLINIC VISIT: HCPCS

## 2023-05-24 PROCEDURE — 99213 OFFICE O/P EST LOW 20 MIN: CPT | Performed by: FAMILY MEDICINE

## 2023-05-24 ASSESSMENT — PAIN SCALES - GENERAL: PAINLEVEL: NO PAIN (0)

## 2023-05-24 NOTE — NURSING NOTE
Patient here for right calf pain discomfort. This started 1 month ago and he walks 3 miles a day. Medication Reconciliation: complete.    Beth Gotti LPN  5/24/2023 9:59 AM

## 2023-05-25 NOTE — PROGRESS NOTES
SUBJECTIVE:   Chao Kearney is a 77 year old male who presents to clinic today for the following health issues:calf pain    Patient arrives here because.  States is been going on for about 2 months.  States it occurs with rest and walking.  He typically likes to walk 2 miles a day but is found that the pain will start.  Persists even with rest.  Use ice packs.  He can push through the Pain.  He also reports certain movements will make it worse.  Twisting and turning.  He is typically in the upper calf.  He reports occasionally radiates.        Patient Active Problem List    Diagnosis Date Noted     Pain of right lower leg 05/24/2023     Priority: Medium     Generalized anxiety disorder 01/17/2018     Priority: Medium     Elevated prostate specific antigen (PSA) 01/17/2018     Priority: Medium     Overview:   in October 2005.  The patient is taking Levaquin daily for two months.  He   will have another PSA checked in October 2006, recommended by Dr. Wilson.       Hyperlipidemia 01/17/2018     Priority: Medium     Hypertension 01/17/2018     Priority: Medium     Plantar fasciitis 01/17/2018     Priority: Medium     Overview:   right       Benign non-nodular prostatic hyperplasia with lower urinary tract symptoms 07/27/2016     Priority: Medium     Brain injury (H) 01/16/2015     Priority: Medium     Retention of urine 12/12/2014     Priority: Medium     Right hip pain 10/10/2014     Priority: Medium     Anemia 09/22/2014     Priority: Medium     Hydrocephalus (H) 09/22/2014     Priority: Medium     SAH (subarachnoid hemorrhage) (H) 09/22/2014     Priority: Medium     Actinic keratosis 01/28/2014     Priority: Medium     Seborrheic keratosis 01/28/2014     Priority: Medium     H/O adenomatous polyp of colon 04/27/2010     Priority: Medium     Diverticulosis of sigmoid colon 04/26/2010     Priority: Medium     Past Medical History:   Diagnosis Date     Actinic keratosis     No Comments Provided     Anemia     No  Comments Provided     Diverticulosis of large intestine without perforation or abscess without bleeding     No Comments Provided     Elevated prostate specific antigen (PSA)     No Comments Provided     Essential (primary) hypertension     No Comments Provided     Generalized anxiety disorder     No Comments Provided     History of colonic polyps     No Comments Provided     Hydrocephalus (H)     (obstructive)     Hyperlipidemia     No Comments Provided     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 Marin 1/24/05     Nontraumatic subarachnoid hemorrhage (H)     No Comments Provided     Osteoarthritis of hip     No Comments Provided     Other seborrheic keratosis     No Comments Provided     Other specified disorders of bone, unspecified site (CODE)     1/05,Lingual sean, bony growth, lower mandible, evaluation Dr. Marin 1/05     Pain in right hip     No Comments Provided     Personal history of other medical treatment (CODE)     05/16/05,Q wave in 3 and AVF leads on electrocardiogram done     Plantar fascial fibromatosis     No Comments Provided     Retention of urine     No Comments Provided     Screening for other and unspecified cardiovascular conditions     05/20/2005,Stress echocardiogram is negative for ischemia with ejection fraction of 85%     Stiffness of unspecified joint, not elsewhere classified     No Comments Provided        Review of Systems     OBJECTIVE:     /58   Pulse 52   Temp 97.2  F (36.2  C)   Resp 18   Wt 84.2 kg (185 lb 9.6 oz)   SpO2 97%   BMI 28.64 kg/m    Body mass index is 28.64 kg/m .  Physical Exam  Constitutional:       Appearance: Normal appearance.   Musculoskeletal:      Comments: Patient has good dorsal pedis pulses.  Posterior tibialis is slightly decreased but is palpable.  Good capillary refill   Skin:     General: Skin is warm.    Neurological:      Mental Status: He is alert.         Diagnostic Test Results:  none     ASSESSMENT/PLAN:         (P13.978) Right calf pain  (primary encounter diagnosis)  Comment: I suspect muscular in origin.  Plan: Physical Therapy Referral       This can occur with rest and activity.  Along with certain movements.  Dorsal pedis pulses are good.        Proceed with physical therapy consult.  Cullen Grier MD  [facility]

## 2023-06-05 ENCOUNTER — TRANSFERRED RECORDS (OUTPATIENT)
Dept: HEALTH INFORMATION MANAGEMENT | Facility: OTHER | Age: 78
End: 2023-06-05
Payer: COMMERCIAL

## 2023-06-06 ENCOUNTER — OFFICE VISIT (OUTPATIENT)
Dept: FAMILY MEDICINE | Facility: OTHER | Age: 78
End: 2023-06-06
Payer: COMMERCIAL

## 2023-06-06 VITALS
DIASTOLIC BLOOD PRESSURE: 70 MMHG | HEIGHT: 68 IN | HEART RATE: 65 BPM | BODY MASS INDEX: 27.68 KG/M2 | SYSTOLIC BLOOD PRESSURE: 120 MMHG | TEMPERATURE: 98 F | WEIGHT: 182.6 LBS | RESPIRATION RATE: 16 BRPM | OXYGEN SATURATION: 97 %

## 2023-06-06 DIAGNOSIS — S69.92XA FISH HOOK INJURY OF FINGER OF LEFT HAND, INITIAL ENCOUNTER: Primary | ICD-10-CM

## 2023-06-06 PROCEDURE — G0463 HOSPITAL OUTPT CLINIC VISIT: HCPCS

## 2023-06-06 PROCEDURE — 99213 OFFICE O/P EST LOW 20 MIN: CPT | Performed by: NURSE PRACTITIONER

## 2023-06-06 RX ORDER — MUPIROCIN 20 MG/G
OINTMENT TOPICAL 2 TIMES DAILY
Qty: 15 G | Refills: 0 | Status: SHIPPED | OUTPATIENT
Start: 2023-06-06 | End: 2023-06-14

## 2023-06-06 RX ORDER — CEPHALEXIN 500 MG/1
500 CAPSULE ORAL 2 TIMES DAILY
Qty: 14 CAPSULE | Refills: 0 | Status: SHIPPED | OUTPATIENT
Start: 2023-06-06 | End: 2023-06-14

## 2023-06-06 ASSESSMENT — PAIN SCALES - GENERAL: PAINLEVEL: MILD PAIN (2)

## 2023-06-06 NOTE — NURSING NOTE
"Pt presents to  for fish hook in L hand ring finger.    Chief Complaint   Patient presents with     Hand Injury     L hand, ring finger       FOOD SECURITY SCREENING QUESTIONS  Hunger Vital Signs:  Within the past 12 months we worried whether our food would run out before we got money to buy more. Never  Within the past 12 months the food we bought just didn't last and we didn't have money to get more. Never  Martine Dearmon 6/6/2023 1:17 PM      Initial /70 (BP Location: Right arm, Patient Position: Sitting, Cuff Size: Adult Regular)   Pulse 65   Temp 98  F (36.7  C) (Tympanic)   Resp 16   Ht 1.727 m (5' 8\")   Wt 82.8 kg (182 lb 9.6 oz)   SpO2 97%   BMI 27.76 kg/m   Estimated body mass index is 27.76 kg/m  as calculated from the following:    Height as of this encounter: 1.727 m (5' 8\").    Weight as of this encounter: 82.8 kg (182 lb 9.6 oz).  Medication Reconciliation: complete    Martine Bach    "
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
## 2023-06-06 NOTE — PATIENT INSTRUCTIONS
Monitor for secondary infection    Return for reevaluation if worsening redness, pain, edema or should you develop fever    Antibiotics twice a day for 7 days

## 2023-06-14 ENCOUNTER — OFFICE VISIT (OUTPATIENT)
Dept: FAMILY MEDICINE | Facility: OTHER | Age: 78
End: 2023-06-14
Attending: STUDENT IN AN ORGANIZED HEALTH CARE EDUCATION/TRAINING PROGRAM
Payer: COMMERCIAL

## 2023-06-14 VITALS
OXYGEN SATURATION: 97 % | SYSTOLIC BLOOD PRESSURE: 122 MMHG | TEMPERATURE: 97.3 F | HEART RATE: 53 BPM | WEIGHT: 182.25 LBS | HEIGHT: 68 IN | DIASTOLIC BLOOD PRESSURE: 66 MMHG | RESPIRATION RATE: 18 BRPM | BODY MASS INDEX: 27.62 KG/M2

## 2023-06-14 DIAGNOSIS — S86.811D STRAIN OF CALF MUSCLE, RIGHT, SUBSEQUENT ENCOUNTER: Primary | ICD-10-CM

## 2023-06-14 PROCEDURE — G0463 HOSPITAL OUTPT CLINIC VISIT: HCPCS

## 2023-06-14 PROCEDURE — 99213 OFFICE O/P EST LOW 20 MIN: CPT | Performed by: STUDENT IN AN ORGANIZED HEALTH CARE EDUCATION/TRAINING PROGRAM

## 2023-06-14 ASSESSMENT — PAIN SCALES - GENERAL: PAINLEVEL: MILD PAIN (3)

## 2023-06-14 NOTE — PROGRESS NOTES
"  Assessment & Plan     Strain of calf muscle, right, subsequent encounter  Agree with previous visit this is likely a calf muscle strain/overuse injury. Advised supportive cares-- ice or heat, tylenol. He will call or stop by the PT clinic to make an appointment. Feet warm and well perfused, normal pulses so less likely claudication but will monitor and consider ABIs               BMI:   Estimated body mass index is 27.71 kg/m  as calculated from the following:    Height as of this encounter: 1.727 m (5' 8\").    Weight as of this encounter: 82.7 kg (182 lb 4 oz).           No follow-ups on file.    Bubba Long MD  [facility]    Damaso Guidry is a 77 year old, presenting for the following health issues:  Musculoskeletal Problem (Follow up with ongoing pain to right hip, right calf rated 3)         View : No data to display.              Musculoskeletal Problem    History of Present Illness       Reason for visit:  Lower right leg pain    He eats 2-3 servings of fruits and vegetables daily.He consumes 0 sweetened beverage(s) daily.He exercises with enough effort to increase his heart rate 9 or less minutes per day.  He exercises with enough effort to increase his heart rate 3 or less days per week.   He is taking medications regularly.    Right calf pain   - started a few months ago   - usually walks 3 miles/day. Now noticing pain in left calf after a few miles  - no chest pain or sob, no left calf pain  - no trauma to the area  - was seen about 2-3 weeks ago for this, referred to PT but he is unsure if he got a call or not              Review of Systems   Constitutional, HEENT, cardiovascular, pulmonary, gi and gu systems are negative, except as otherwise noted.      Objective    /66 (BP Location: Right arm, Patient Position: Sitting, Cuff Size: Adult Regular)   Pulse 53   Temp 97.3  F (36.3  C) (Tympanic)   Resp 18   Ht 1.727 m (5' 8\")   Wt 82.7 kg (182 lb 4 oz)   " SpO2 97%   BMI 27.71 kg/m    Body mass index is 27.71 kg/m .  Physical Exam   GENERAL: healthy, alert and no distress  MS: no gross musculoskeletal defects noted, no edema  SKIN: no suspicious lesions or rashes  Foot exam: normal DP and PT pulses, no trophic changes or ulcerative lesions and normal sensory exam

## 2023-06-14 NOTE — PATIENT INSTRUCTIONS
Call [phone] for [facility] physical therapy.   Ok to stop in to make an appointment as well     Can ice or use heat on calf for pain. Ok to use tylenol as needed for pain     If PT does not help, we can consider an ultrasound of your leg vessels to assess for anything called claudication (blockage of arteries).

## 2023-06-14 NOTE — NURSING NOTE
Patient presents to clinic for follow up with ongoing right leg pain rated 3 with any movement.    Medication Rec Complete  Martine Laurent LPN............6/14/2023 10:33 AM

## 2023-08-31 ENCOUNTER — MYC MEDICAL ADVICE (OUTPATIENT)
Dept: FAMILY MEDICINE | Facility: OTHER | Age: 78
End: 2023-08-31
Payer: COMMERCIAL

## 2023-08-31 DIAGNOSIS — I10 ESSENTIAL HYPERTENSION, BENIGN: ICD-10-CM

## 2023-09-05 RX ORDER — HYDROCHLOROTHIAZIDE 12.5 MG/1
TABLET ORAL
Qty: 90 TABLET | Refills: 3 | Status: SHIPPED | OUTPATIENT
Start: 2023-09-05 | End: 2024-01-26

## 2023-09-06 ENCOUNTER — OFFICE VISIT (OUTPATIENT)
Dept: FAMILY MEDICINE | Facility: OTHER | Age: 78
End: 2023-09-06
Attending: NURSE PRACTITIONER
Payer: COMMERCIAL

## 2023-09-06 VITALS
HEART RATE: 54 BPM | HEIGHT: 68 IN | TEMPERATURE: 96.9 F | OXYGEN SATURATION: 97 % | WEIGHT: 189 LBS | RESPIRATION RATE: 16 BRPM | DIASTOLIC BLOOD PRESSURE: 52 MMHG | SYSTOLIC BLOOD PRESSURE: 148 MMHG | BODY MASS INDEX: 28.64 KG/M2

## 2023-09-06 DIAGNOSIS — L98.9 SKIN LESION: Primary | ICD-10-CM

## 2023-09-06 PROCEDURE — G0463 HOSPITAL OUTPT CLINIC VISIT: HCPCS

## 2023-09-06 PROCEDURE — 99213 OFFICE O/P EST LOW 20 MIN: CPT

## 2023-09-06 ASSESSMENT — PAIN SCALES - GENERAL: PAINLEVEL: NO PAIN (0)

## 2023-09-06 NOTE — PROGRESS NOTES
ASSESSMENT/PLAN:    I have reviewed the nursing notes.  I have reviewed the findings, diagnosis, plan and need for follow up with the patient.    1. Skin lesion    Patient presents with concerns of 2 skin lesions on his left forearm.  Discussed with patient that his lesions do appear to be consistent with possible actinic keratosis.  Discussed that actinic keratosis is usually benign but can become malignant in uncommon cases.  Advised patient to avoid picking or scratching at the lesions.  Discussed that he should follow-up with his primary care provider to have these evaluated and possibly biopsied.  Discussed that he should monitor the lesions for changes. Advised patient that he can use OTC moisturizers if needed and should avoid the sun and use sunscreen when outdoors.     Discussed warning signs/symptoms indicative of need to f/u    Follow up if symptoms persist or worsen or concerns    I explained my diagnostic considerations and recommendations to the patient, who voiced understanding and agreement with the treatment plan. All questions were answered. We discussed potential side effects of any prescribed or recommended therapies, as well as expectations for response to treatments.    YUMIKO Valencia CNP  9/6/2023  11:50 AM    HPI:    Chao Kearney is a 77 year old male  who presents to [facility] today for concerns of skin concerns    Patient states that he has 2 skin lesions on his left forearm.  He states that he noticed the first 1 on his upper left forearm about a month ago and the other on his lower left forearm when he noticed a few days ago.  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  He states he has been picking at the lesion on his lower forearm and applied some Vaseline to the lesions.  Patient does have a history of actinic keratosis.  He states that he wears long sleeves frequently and does  not routinely check his arms or skin.  He is unsure if either lesion has changed since he first noticed them.  He denies any itchiness or bleeding from either lesion.  He denies any history of skin cancer.    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 Marin 1/24/05    Nontraumatic subarachnoid hemorrhage (H)     No Comments Provided    Osteoarthritis of hip     No Comments Provided    Other seborrheic keratosis     No Comments Provided    Other specified disorders of bone, unspecified site (CODE)     1/05,Lingual sean, bony growth, lower mandible, evaluation Dr. Marin 1/05    Pain in right hip     No Comments Provided    Personal history of other medical treatment (CODE)     05/16/05,Q wave in 3 and AVF leads on electrocardiogram done    Plantar fascial fibromatosis     No Comments Provided    Retention of urine     No Comments Provided    Screening for other and unspecified cardiovascular conditions     05/20/2005,Stress echocardiogram is negative for ischemia with ejection fraction of 85%    Stiffness of unspecified joint, not elsewhere classified     No Comments Provided     Past Surgical History:   Procedure Laterality Date    ARTHROPLASTY HIP ANTERIOR Right 5/17/2022    Procedure: ARTHROPLASTY, HIP, TOTAL, DIRECT ANTERIOR APPROACH;  Surgeon:  Luis Miguel Lama MD;  Location:  OR    COLONOSCOPY  2010    (2010),F/U     COLONOSCOPY  10/15/2015    10/15/15,F/U     EXTRACAPSULAR CATARACT EXTRATION WITH INTRAOCULAR LENS IMPLANT          OTHER SURGICAL HISTORY      3/26/14,42965.0,[place] TOTAL HIP ARTHROPLASTY,Left,direct anterior approach, Dr. Lama    OTHER SURGICAL HISTORY      ,[id number],CRANIOTOMY    OTHER SURGICAL HISTORY      2016,,HERNIA REPAIR,Left,LIH with mesh     Social History     Tobacco Use    Smoking status: Former     Types: Cigarettes     Quit date: 1970     Years since quittin.7    Smokeless tobacco: Never    Tobacco comments:     smoked off and on for 2 years   Substance Use Topics    Alcohol use: Yes     Alcohol/week: 2.0 standard drinks of alcohol     Types: 2 Cans of beer per week     Comment: 2 times a week/2 drinks     Current Outpatient Medications   Medication Sig Dispense Refill    aspirin 81 MG EC tablet Take 1 tablet (81 mg) by mouth 2 times daily 60 tablet 0    Cholecalciferol (VITAMIN D3) 1000 UNITS CAPS Take 1 capsule by mouth daily      hydrochlorothiazide (HYDRODIURIL) 12.5 MG tablet TAKE 1 TABLET EVERY DAY 90 tablet 3    hydrOXYzine (ATARAX) 10 MG tablet Take 1 tablet (10 mg) by mouth every 6 hours as needed for itching or anxiety (with pain, moderate pain) 30 tablet 0    lisinopril (ZESTRIL) 40 MG tablet Take 1 tablet daily 90 tablet 3    meclizine (ANTIVERT) 12.5 MG tablet TAKE 2 TABLETS(25 MG) BY MOUTH FOUR TIMES DAILY AS NEEDED FOR DIZZINESS 20 tablet 3    Multiple Vitamins-Minerals (MULTILEX-T&M) TABS Take 1 tablet by mouth daily      rosuvastatin (CRESTOR) 20 MG tablet TAKE 1 TABLET (20 MG) BY MOUTH AT BEDTIME 90 tablet 3    tamsulosin (FLOMAX) 0.4 MG capsule Take 2 capsules (0.8 mg) by mouth daily with food 180 capsule 3    triamcinolone (KENALOG) 0.1 % external cream Apply topically to affected area(s) 3 times daily. 30 g 1     Allergies   Allergen Reactions     "Fluorescein-Benoxinate Other (See Comments)     Does not recall reaction     Past medical history, past surgical history, current medications and allergies reviewed and accurate to the best of my knowledge.      ROS:  Refer to HPI    BP (!) 148/52 (BP Location: Left arm, Patient Position: Sitting, Cuff Size: Adult Regular)   Pulse 54   Temp 96.9  F (36.1  C) (Tympanic)   Resp 16   Ht 1.727 m (5' 8\")   Wt 85.7 kg (189 lb)   SpO2 97%   BMI 28.74 kg/m      EXAM:  General Appearance: Well appearing 77 year old male, appropriate appearance for age. No acute distress   Respiratory: normal chest wall and respirations.  Normal effort. No increased work of breathing.  No cough appreciated.  Cardiac: RRR with no murmurs  Dermatological: see photos, lower arm lesion measuring 0.5 cm x 0.5 cm and upper forearm lesion measuring 1.0 cm x 1.0 cm  Neuro: Alert and oriented to person, place, and time. Has hx of short term memory deficits  Psychological: normal affect, alert, oriented, and pleasant.           "

## 2023-09-06 NOTE — NURSING NOTE
"Chief Complaint   Patient presents with    Derm Problem     Noticed within the past month     Patient in clinic for new spot on wrist and forearm.    Initial BP (!) 150/82 (BP Location: Left arm, Patient Position: Sitting, Cuff Size: Adult Regular)   Pulse 54   Temp 96.9  F (36.1  C) (Tympanic)   Resp 16   Ht 1.727 m (5' 8\")   Wt 85.7 kg (189 lb)   SpO2 97%   BMI 28.74 kg/m   Estimated body mass index is 28.74 kg/m  as calculated from the following:    Height as of this encounter: 1.727 m (5' 8\").    Weight as of this encounter: 85.7 kg (189 lb).     Advance Care Directive on file? yes    FOOD SECURITY SCREENING QUESTIONS:    The next two questions are to help us understand your food security.  If you are feeling you need any assistance in this area, we have resources available to support you today.    Hunger Vital Signs:  Within the past 12 months we worried whether our food would run out before we got money to buy more. Never  Within the past 12 months the food we bought just didn't last and we didn't have money to get more. Never  Sally Bach LPN,LPN on 9/6/2023 at 11:36 AM      Sally Bach LPN     "

## 2023-09-13 ENCOUNTER — OFFICE VISIT (OUTPATIENT)
Dept: FAMILY MEDICINE | Facility: OTHER | Age: 78
End: 2023-09-13
Attending: FAMILY MEDICINE
Payer: MEDICARE

## 2023-09-13 VITALS
BODY MASS INDEX: 28.43 KG/M2 | OXYGEN SATURATION: 96 % | SYSTOLIC BLOOD PRESSURE: 124 MMHG | HEART RATE: 55 BPM | RESPIRATION RATE: 16 BRPM | WEIGHT: 187 LBS | TEMPERATURE: 97 F | DIASTOLIC BLOOD PRESSURE: 72 MMHG

## 2023-09-13 DIAGNOSIS — L82.1 SEBORRHEIC KERATOSIS: Primary | ICD-10-CM

## 2023-09-13 DIAGNOSIS — Z23 INFLUENZA VACCINATION ADMINISTERED AT CURRENT VISIT: ICD-10-CM

## 2023-09-13 PROCEDURE — G0463 HOSPITAL OUTPT CLINIC VISIT: HCPCS | Mod: 25

## 2023-09-13 PROCEDURE — G0008 ADMIN INFLUENZA VIRUS VAC: HCPCS

## 2023-09-13 PROCEDURE — 99213 OFFICE O/P EST LOW 20 MIN: CPT | Performed by: FAMILY MEDICINE

## 2023-09-13 ASSESSMENT — PAIN SCALES - GENERAL: PAINLEVEL: NO PAIN (0)

## 2023-09-13 NOTE — NURSING NOTE
"Chief Complaint   Patient presents with    Consult     Skin lesion     Patient states skin lesion appeared recently located on left wrist.  Initial /72   Pulse 55   Temp 97  F (36.1  C) (Tympanic)   Resp 16   Wt 84.8 kg (187 lb)   SpO2 96%   BMI 28.43 kg/m   Estimated body mass index is 28.43 kg/m  as calculated from the following:    Height as of 9/6/23: 1.727 m (5' 8\").    Weight as of this encounter: 84.8 kg (187 lb).  Medication Reconciliation: complete      Tegan Singleton LPN on 9/13/2023 at 2:34 PM     "

## 2023-09-13 NOTE — PROGRESS NOTES
"Nursing Notes:   Tegan Singleton LPN  2023  2:35 PM  Signed  Chief Complaint   Patient presents with    Consult     Skin lesion     Patient states skin lesion appeared recently located on left wrist.  Initial /72   Pulse 55   Temp 97  F (36.1  C) (Tympanic)   Resp 16   Wt 84.8 kg (187 lb)   SpO2 96%   BMI 28.43 kg/m   Estimated body mass index is 28.43 kg/m  as calculated from the following:    Height as of 23: 1.727 m (5' 8\").    Weight as of this encounter: 84.8 kg (187 lb).  Medication Reconciliation: complete      Tegan Singleton LPN on 2023 at 2:34 PM     SUBJECTIVE:  Chao Kearney  is a 77 year old male who comes in for evaluation of a skin lesion.  He was seen a week ago with 2 skin lesions on his left forearm that were felt to likely be actinic keratoses.  These were not treated but he was advised to follow-up with me.  He has no history of skin cancer.    He has not had his flu shot yet this year.  COVID booster not yet available.  Otherwise up-to-date on immunizations.    Past Medical, Family, and Social History reviewed and updated as noted below.   ROS is negative except as noted above       Allergies   Allergen Reactions    Fluorescein-Benoxinate Other (See Comments)     Does not recall reaction   ,   Family History   Problem Relation Age of Onset    Cancer Mother 78        Cancer, lung cancer at age 78, developed brain metastases    Heart Disease Father 50        Heart Disease,heart failure    Other - See Comments Father          MS, long-standing    Cancer Paternal Grandfather         Cancer, of cancer of unknown type    Other - See Comments Sister          MS   ,   Current Outpatient Medications   Medication    aspirin 81 MG EC tablet    Cholecalciferol (VITAMIN D3) 1000 UNITS CAPS    hydrochlorothiazide (HYDRODIURIL) 12.5 MG tablet    lisinopril (ZESTRIL) 40 MG tablet    Multiple Vitamins-Minerals (MULTILEX-T&M) TABS    rosuvastatin (CRESTOR) 20 MG tablet    " tamsulosin (FLOMAX) 0.4 MG capsule    triamcinolone (KENALOG) 0.1 % external cream    hydrOXYzine (ATARAX) 10 MG tablet    meclizine (ANTIVERT) 12.5 MG tablet     No current facility-administered medications for this visit.   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 Marin 1/24/05    Nontraumatic subarachnoid hemorrhage (H)     No Comments Provided    Osteoarthritis of hip     No Comments Provided    Other seborrheic keratosis     No Comments Provided    Other specified disorders of bone, unspecified site (CODE)     1/05,Lingual sean, bony growth, lower mandible, evaluation Dr. Marin 1/05    Pain in right hip     No Comments Provided    Personal history of other medical treatment (CODE)     05/16/05,Q wave in 3 and AVF leads on electrocardiogram done    Plantar fascial fibromatosis     No Comments Provided    Retention of urine     No Comments Provided    Screening for other and unspecified cardiovascular conditions     05/20/2005,Stress echocardiogram is negative for ischemia with ejection fraction of 85%    Stiffness of unspecified joint, not elsewhere classified     No Comments Provided   ,   Patient Active Problem List    Diagnosis Date Noted    Pain of right lower leg 05/24/2023     Priority: Medium    Generalized anxiety disorder  2018     Priority: Medium    Elevated prostate specific antigen (PSA) 2018     Priority: Medium     Overview:   in 2005.  The patient is taking Levaquin daily for two months.  He   will have another PSA checked in 2006, recommended by Dr. Wilson.      Hyperlipidemia 2018     Priority: Medium    Hypertension 2018     Priority: Medium    Plantar fasciitis 2018     Priority: Medium     Overview:   right      Benign non-nodular prostatic hyperplasia with lower urinary tract symptoms 2016     Priority: Medium    Brain injury (H) 2015     Priority: Medium    Retention of urine 2014     Priority: Medium    Right hip pain 10/10/2014     Priority: Medium    Anemia 2014     Priority: Medium    Hydrocephalus (H) 2014     Priority: Medium    SAH (subarachnoid hemorrhage) (H) 2014     Priority: Medium    Actinic keratosis 2014     Priority: Medium    Seborrheic keratosis 2014     Priority: Medium    H/O adenomatous polyp of colon 2010     Priority: Medium    Diverticulosis of sigmoid colon 2010     Priority: Medium   ,   Past Surgical History:   Procedure Laterality Date    ARTHROPLASTY HIP ANTERIOR Right 2022    Procedure: ARTHROPLASTY, HIP, TOTAL, DIRECT ANTERIOR APPROACH;  Surgeon: Luis Miguel aLma MD;  Location:  OR    COLONOSCOPY  2010    (2010),F/U     COLONOSCOPY  10/15/2015    10/15/15,F/U     EXTRACAPSULAR CATARACT EXTRATION WITH INTRAOCULAR LENS IMPLANT          OTHER SURGICAL HISTORY      3/26/14,06358.0,[place] TOTAL HIP ARTHROPLASTY,Left,direct anterior approach, Dr. Lama    OTHER SURGICAL HISTORY      ,[id number],CRANIOTOMY    OTHER SURGICAL HISTORY      2016,,HERNIA REPAIR,Left,LIH with mesh    and   Social History     Tobacco Use    Smoking status: Former     Types: Cigarettes     Quit date: 1970     Years since quittin.7    Smokeless tobacco: Never     Tobacco comments:     smoked off and on for 2 years   Substance Use Topics    Alcohol use: Yes     Alcohol/week: 2.0 standard drinks of alcohol     Types: 2 Cans of beer per week     Comment: 2 times a week/2 drinks     OBJECTIVE:  /72   Pulse 55   Temp 97  F (36.1  C) (Tympanic)   Resp 16   Wt 84.8 kg (187 lb)   SpO2 96%   BMI 28.43 kg/m     EXAM:  Alert cooperative, no distress.  Has a pigmented lesion by his left wrist where he wears his watch that appears to be a seborrheic keratosis.  Also several of them scattered on his back and other places.  No worrisome findings are noted.  ASSESSMENT/Plan :    Chao was seen today for consult.    Diagnoses and all orders for this visit:    Seborrheic keratosis    Influenza vaccination administered at current visit  -     INFLUENZA VACCINE 65+ (FLUZONE HD)      Reviewed with the patient and his wife about this and the benign nature of these.  Showed in some pictures as well.  Will employ expectant management with these.  Offered removal if become symptomatic.    Flu vaccine given today.    Chan Cook MD        Answers submitted by the patient for this visit:  General Questionnaire (Submitted on 9/11/2023)  Chief Complaint: Chronic problems general questions HPI Form  How many servings of fruits and vegetables do you eat daily?: 2-3  On average, how many sweetened beverages do you drink each day (Examples: soda, juice, sweet tea, etc.  Do NOT count diet or artificially sweetened beverages)?: 1  How many minutes a day do you exercise enough to make your heart beat faster?: 60 or more  How many days a week do you exercise enough to make your heart beat faster?: 6  How many days per week do you miss taking your medication?: 0  General Concern (Submitted on 9/11/2023)  Chief Complaint: Chronic problems general questions HPI Form  What is the reason for your visit today?: 2 black moles on arm and wrist  What are your symptoms?: who knows when they started.  Are  your symptoms:: Staying the same  Have you had these symptoms before?: No  Is there anything that makes you feel worse?: no  Is there anything that makes you feel better?: no

## 2023-10-18 DIAGNOSIS — N40.1 BENIGN NON-NODULAR PROSTATIC HYPERPLASIA WITH LOWER URINARY TRACT SYMPTOMS: ICD-10-CM

## 2023-10-24 RX ORDER — TAMSULOSIN HYDROCHLORIDE 0.4 MG/1
CAPSULE ORAL
Qty: 180 CAPSULE | Refills: 3 | Status: SHIPPED | OUTPATIENT
Start: 2023-10-24 | End: 2024-01-26

## 2023-10-24 NOTE — TELEPHONE ENCOUNTER
[facility] sent Rx request for the following:      Requested Prescriptions   Pending Prescriptions Disp Refills    tamsulosin (FLOMAX) 0.4 MG capsule [Pharmacy Med Name: TAMSULOSIN HYDROCHLORIDE 0.4 MG Capsule] 180 capsule 10     Sig: TAKE 2 CAPSULES EVERY DAY WITH FOOD       Alpha Blockers Passed - 10/18/2023  8:55 AM   Last Prescription Date:   10/11/22  Last Fill Qty/Refills:         180, R-3    Last Office Visit:              9/13/23   Future Office visit:           none     Jen Travis RN on 10/24/2023 at 3:05 PM

## 2023-12-16 ENCOUNTER — HEALTH MAINTENANCE LETTER (OUTPATIENT)
Age: 78
End: 2023-12-16

## 2024-01-26 ENCOUNTER — OFFICE VISIT (OUTPATIENT)
Dept: PEDIATRICS | Facility: OTHER | Age: 79
End: 2024-01-26
Attending: INTERNAL MEDICINE
Payer: COMMERCIAL

## 2024-01-26 VITALS
WEIGHT: 183 LBS | BODY MASS INDEX: 28.72 KG/M2 | SYSTOLIC BLOOD PRESSURE: 122 MMHG | TEMPERATURE: 97.4 F | HEIGHT: 67 IN | HEART RATE: 62 BPM | RESPIRATION RATE: 16 BRPM | OXYGEN SATURATION: 96 % | DIASTOLIC BLOOD PRESSURE: 70 MMHG

## 2024-01-26 DIAGNOSIS — S06.9X9D BRAIN INJURY WITH LOSS OF CONSCIOUSNESS, SUBSEQUENT ENCOUNTER: ICD-10-CM

## 2024-01-26 DIAGNOSIS — R73.03 PRE-DIABETES: ICD-10-CM

## 2024-01-26 DIAGNOSIS — G93.89 CEREBRAL VENTRICULOMEGALY: ICD-10-CM

## 2024-01-26 DIAGNOSIS — N40.1 BENIGN PROSTATIC HYPERPLASIA WITH URINARY FREQUENCY: ICD-10-CM

## 2024-01-26 DIAGNOSIS — N40.1 BENIGN NON-NODULAR PROSTATIC HYPERPLASIA WITH LOWER URINARY TRACT SYMPTOMS: ICD-10-CM

## 2024-01-26 DIAGNOSIS — Z00.00 ENCOUNTER FOR MEDICARE ANNUAL WELLNESS EXAM: Primary | ICD-10-CM

## 2024-01-26 DIAGNOSIS — G91.9 HYDROCEPHALUS, UNSPECIFIED TYPE (H): ICD-10-CM

## 2024-01-26 DIAGNOSIS — I10 ESSENTIAL HYPERTENSION, BENIGN: ICD-10-CM

## 2024-01-26 DIAGNOSIS — S06.9X9S BRAIN INJURY WITH LOSS OF CONSCIOUSNESS, SEQUELA (H): ICD-10-CM

## 2024-01-26 DIAGNOSIS — R73.09 ELEVATED GLUCOSE LEVEL: ICD-10-CM

## 2024-01-26 DIAGNOSIS — M25.551 RIGHT HIP PAIN: ICD-10-CM

## 2024-01-26 DIAGNOSIS — R35.0 BENIGN PROSTATIC HYPERPLASIA WITH URINARY FREQUENCY: ICD-10-CM

## 2024-01-26 DIAGNOSIS — I60.9 SAH (SUBARACHNOID HEMORRHAGE) (H): ICD-10-CM

## 2024-01-26 DIAGNOSIS — R41.3 SHORT-TERM MEMORY LOSS: ICD-10-CM

## 2024-01-26 DIAGNOSIS — E78.2 MIXED HYPERLIPIDEMIA: ICD-10-CM

## 2024-01-26 DIAGNOSIS — R97.20 ELEVATED PROSTATE SPECIFIC ANTIGEN (PSA): ICD-10-CM

## 2024-01-26 DIAGNOSIS — Z13.0 SCREENING, ANEMIA, DEFICIENCY, IRON: ICD-10-CM

## 2024-01-26 PROBLEM — M79.661 PAIN OF RIGHT LOWER LEG: Status: RESOLVED | Noted: 2023-05-24 | Resolved: 2024-01-26

## 2024-01-26 PROBLEM — E78.5 HYPERLIPIDEMIA: Chronic | Status: ACTIVE | Noted: 2018-01-17

## 2024-01-26 LAB
ANION GAP SERPL CALCULATED.3IONS-SCNC: 10 MMOL/L (ref 7–15)
BUN SERPL-MCNC: 23.9 MG/DL (ref 8–23)
CALCIUM SERPL-MCNC: 10 MG/DL (ref 8.8–10.2)
CHLORIDE SERPL-SCNC: 101 MMOL/L (ref 98–107)
CHOLEST SERPL-MCNC: 185 MG/DL
CREAT SERPL-MCNC: 1.24 MG/DL (ref 0.67–1.17)
DEPRECATED HCO3 PLAS-SCNC: 27 MMOL/L (ref 22–29)
EGFRCR SERPLBLD CKD-EPI 2021: 60 ML/MIN/1.73M2
ERYTHROCYTE [DISTWIDTH] IN BLOOD BY AUTOMATED COUNT: 12.3 % (ref 10–15)
FASTING STATUS PATIENT QL REPORTED: ABNORMAL
GLUCOSE SERPL-MCNC: 106 MG/DL (ref 70–99)
HBA1C MFR BLD: 5.9 % (ref 4–6.2)
HCT VFR BLD AUTO: 42.8 % (ref 40–53)
HDLC SERPL-MCNC: 61 MG/DL
HGB BLD-MCNC: 14.9 G/DL (ref 13.3–17.7)
LDLC SERPL CALC-MCNC: 102 MG/DL
MCH RBC QN AUTO: 31.6 PG (ref 26.5–33)
MCHC RBC AUTO-ENTMCNC: 34.8 G/DL (ref 31.5–36.5)
MCV RBC AUTO: 91 FL (ref 78–100)
NONHDLC SERPL-MCNC: 124 MG/DL
PLATELET # BLD AUTO: 201 10E3/UL (ref 150–450)
POTASSIUM SERPL-SCNC: 4.1 MMOL/L (ref 3.4–5.3)
PSA SERPL DL<=0.01 NG/ML-MCNC: 15.21 NG/ML (ref 0–6.5)
RBC # BLD AUTO: 4.71 10E6/UL (ref 4.4–5.9)
SODIUM SERPL-SCNC: 138 MMOL/L (ref 135–145)
TRIGL SERPL-MCNC: 108 MG/DL
WBC # BLD AUTO: 6.6 10E3/UL (ref 4–11)

## 2024-01-26 PROCEDURE — G0463 HOSPITAL OUTPT CLINIC VISIT: HCPCS | Performed by: INTERNAL MEDICINE

## 2024-01-26 PROCEDURE — G0439 PPPS, SUBSEQ VISIT: HCPCS | Performed by: INTERNAL MEDICINE

## 2024-01-26 PROCEDURE — 99214 OFFICE O/P EST MOD 30 MIN: CPT | Mod: 25 | Performed by: INTERNAL MEDICINE

## 2024-01-26 PROCEDURE — 80061 LIPID PANEL: CPT | Mod: ZL | Performed by: INTERNAL MEDICINE

## 2024-01-26 PROCEDURE — 36415 COLL VENOUS BLD VENIPUNCTURE: CPT | Mod: ZL | Performed by: INTERNAL MEDICINE

## 2024-01-26 PROCEDURE — 80048 BASIC METABOLIC PNL TOTAL CA: CPT | Mod: ZL | Performed by: INTERNAL MEDICINE

## 2024-01-26 PROCEDURE — 84153 ASSAY OF PSA TOTAL: CPT | Mod: ZL | Performed by: INTERNAL MEDICINE

## 2024-01-26 PROCEDURE — 85027 COMPLETE CBC AUTOMATED: CPT | Mod: ZL | Performed by: INTERNAL MEDICINE

## 2024-01-26 PROCEDURE — 83036 HEMOGLOBIN GLYCOSYLATED A1C: CPT | Mod: ZL | Performed by: INTERNAL MEDICINE

## 2024-01-26 RX ORDER — HYDROXYZINE HYDROCHLORIDE 10 MG/1
10 TABLET, FILM COATED ORAL EVERY 6 HOURS PRN
Qty: 30 TABLET | Refills: 1 | Status: SHIPPED | OUTPATIENT
Start: 2024-01-26

## 2024-01-26 RX ORDER — TAMSULOSIN HYDROCHLORIDE 0.4 MG/1
CAPSULE ORAL
Qty: 180 CAPSULE | Refills: 4 | Status: SHIPPED | OUTPATIENT
Start: 2024-01-26

## 2024-01-26 RX ORDER — ROSUVASTATIN CALCIUM 20 MG/1
TABLET, COATED ORAL
Qty: 90 TABLET | Refills: 4 | Status: SHIPPED | OUTPATIENT
Start: 2024-01-26

## 2024-01-26 RX ORDER — LISINOPRIL 40 MG/1
TABLET ORAL
Qty: 90 TABLET | Refills: 4 | Status: SHIPPED | OUTPATIENT
Start: 2024-01-26

## 2024-01-26 RX ORDER — HYDROCHLOROTHIAZIDE 12.5 MG/1
TABLET ORAL
Qty: 90 TABLET | Refills: 4 | Status: SHIPPED | OUTPATIENT
Start: 2024-01-26

## 2024-01-26 ASSESSMENT — ENCOUNTER SYMPTOMS
SHORTNESS OF BREATH: 0
FEVER: 0
MYALGIAS: 0
JOINT SWELLING: 1
HEMATURIA: 0
ARTHRALGIAS: 0
ABDOMINAL PAIN: 0
CHILLS: 0
COUGH: 1
NERVOUS/ANXIOUS: 0
DYSURIA: 0
HEMATOCHEZIA: 0
FREQUENCY: 0
PARESTHESIAS: 0
EYE PAIN: 0
HEADACHES: 0
DIZZINESS: 0
DIARRHEA: 0
PALPITATIONS: 0
CONSTIPATION: 0
HEARTBURN: 0
WEAKNESS: 0
SORE THROAT: 0
NAUSEA: 0

## 2024-01-26 ASSESSMENT — ACTIVITIES OF DAILY LIVING (ADL): CURRENT_FUNCTION: NO ASSISTANCE NEEDED

## 2024-01-26 ASSESSMENT — PAIN SCALES - GENERAL: PAINLEVEL: NO PAIN (0)

## 2024-01-26 NOTE — NURSING NOTE
"Chief Complaint   Patient presents with    Medicare Visit       Initial /70   Pulse 62   Temp 97.4  F (36.3  C) (Tympanic)   Resp 16   Ht 1.689 m (5' 6.5\")   Wt 83 kg (183 lb)   SpO2 96%   BMI 29.09 kg/m   Estimated body mass index is 29.09 kg/m  as calculated from the following:    Height as of this encounter: 1.689 m (5' 6.5\").    Weight as of this encounter: 83 kg (183 lb).  Medication Review: complete    The next two questions are to help us understand your food security.  If you are feeling you need any assistance in this area, we have resources available to support you today.          1/26/2024   SDOH- Food Insecurity   Within the past 12 months, did you worry that your food would run out before you got money to buy more? N   Within the past 12 months, did the food you bought just not last and you didn t have money to get more? N         Health Care Directive:  Patient does not have a Health Care Directive or Living Will: Patient states has Advance Directive and will bring in a copy to clinic.    Norma J. Gosselin, LPN      "

## 2024-01-26 NOTE — PROGRESS NOTES
"Preventive Care Visit  [facility]  Harrison Arguelles MD, Internal Medicine  Jan 26, 2024      SUBJECTIVE:   Chao is a 78 year old, presenting for the following:  Medicare Visit  Needs refills.    Are you in the first 12 months of your Medicare coverage?  No    Healthy Habits:     In general, how would you rate your overall health?  Good    Frequency of exercise:  6-7 days/week    Duration of exercise:  45-60 minutes    Do you usually eat at least 4 servings of fruit and vegetables a day, include whole grains    & fiber and avoid regularly eating high fat or \"junk\" foods?  Yes    Taking medications regularly:  Yes    Medication side effects:  None    Ability to successfully perform activities of daily living:  No assistance needed    Home Safety:  No safety concerns identified    Hearing Impairment:  No hearing concerns    In the past 6 months, have you been bothered by leaking of urine?  No    In general, how would you rate your overall mental or emotional health?  Excellent    Additional concerns today:  No      Today's PHQ-2 Score:       1/26/2024    11:04 AM   PHQ-2 ( 1999 [employer])   Q1: Little interest or pleasure in doing things 0   Q2: Feeling down, depressed or hopeless 0   PHQ-2 Score 0   Q1: Little interest or pleasure in doing things Not at all   Q2: Feeling down, depressed or hopeless Not at all   PHQ-2 Score 0           Have you ever done Advance Care Planning? (For example, a Health Directive, POLST, or a discussion with a medical provider or your loved ones about your wishes): Yes, patient states has an Advance Care Planning document and will bring a copy to the clinic.       Fall risk  Fallen 2 or more times in the past year?: No  Any fall with injury in the past year?: No    Cognitive Screening   1) Repeat 3 items (Leader, Season, Table)    2) Clock draw: ABNORMAL history of brain injury  3) 3 item recall: Recalls NO objects   Results: 0 items recalled: PROBABLE COGNITIVE " IMPAIRMENT, **INFORM PROVIDER**    Mini-CogTM Copyright CARISSA Cheng. Licensed by the author for use in [facility]; reprinted with permission (los@[facility]). All rights reserved.      Do you have sleep apnea, excessive snoring or daytime drowsiness? : no    Reviewed and updated as needed this visit by clinical staff   Tobacco  Allergies  Meds   Med Hx  Surg Hx  Fam Hx  Soc Hx        Reviewed and updated as needed this visit by Provider                  Social History     Tobacco Use    Smoking status: Former     Types: Cigarettes     Quit date: 1970     Years since quittin.1    Smokeless tobacco: Never    Tobacco comments:     smoked off and on for 2 years   Substance Use Topics    Alcohol use: Yes     Alcohol/week: 2.0 standard drinks of alcohol     Types: 2 Cans of beer per week     Comment: 2 times a week/2 drinks             2024    11:03 AM   Alcohol Use   Prescreen: >3 drinks/day or >7 drinks/week? No          No data to display              Do you have a current opioid prescription? No  Do you use any other controlled substances or medications that are not prescribed by a provider? None              Current providers sharing in care for this patient include:   Patient Care Team:  Chan Cook MD as PCP - General (Family Practice)  Chan Cook MD as Assigned PCP    The following health maintenance items are reviewed in Epic and correct as of today:  Health Maintenance   Topic Date Due    MEDICARE ANNUAL WELLNESS VISIT  10/11/2023    FALL RISK ASSESSMENT  2025    LIPID  10/11/2027    ADVANCE CARE PLANNING  10/12/2027    DTAP/TDAP/TD IMMUNIZATION (2 - Td or Tdap) 2030    PHQ-2 (once per calendar year)  Completed    INFLUENZA VACCINE  Completed    Pneumococcal Vaccine: 65+ Years  Completed    ZOSTER IMMUNIZATION  Completed    RSV VACCINE (Pregnancy & 60+)  Completed    COVID-19 Vaccine  Completed    IPV IMMUNIZATION  Aged Out    HPV IMMUNIZATION  Aged Out     "MENINGITIS IMMUNIZATION  Aged Out    RSV MONOCLONAL ANTIBODY  Aged Out    HEPATITIS C SCREENING  Discontinued    COLORECTAL CANCER SCREENING  Discontinued     Lab work is in process  Labs reviewed in [facility]  Immunizations UTD      Review of Systems   Constitutional:  Negative for chills and fever.   HENT:  Negative for congestion, ear pain, hearing loss and sore throat.    Eyes:  Negative for pain and visual disturbance.   Respiratory:  Positive for cough. Negative for shortness of breath.    Cardiovascular:  Negative for chest pain and palpitations.   Gastrointestinal:  Negative for abdominal pain, constipation, diarrhea and nausea.   Genitourinary:  Negative for dysuria, frequency, genital sores, hematuria, penile discharge and urgency.   Musculoskeletal:  Positive for joint swelling. Negative for arthralgias and myalgias.   Skin:  Negative for rash.   Neurological:  Negative for dizziness, weakness and headaches.   Psychiatric/Behavioral:  The patient is not nervous/anxious.           OBJECTIVE:   /70   Pulse 62   Temp 97.4  F (36.3  C) (Tympanic)   Resp 16   Ht 1.689 m (5' 6.5\")   Wt 83 kg (183 lb)   SpO2 96%   BMI 29.09 kg/m     Estimated body mass index is 29.09 kg/m  as calculated from the following:    Height as of this encounter: 1.689 m (5' 6.5\").    Weight as of this encounter: 83 kg (183 lb).  Physical Exam  Gen: Alert, NAD.  Neck: No carotid bruits  CV: RRR no m/r/g  Pulm: CTAB, no w/r/r. No increased work of breathing  Msk: No LE edema  Neuro: Grossly intact  Skin: No concerning lesions.  Psychiatric: Normal affect and insight. Does not appear anxious or depressed.      Diagnostic Test Results:  Labs reviewed in Epic    ASSESSMENT / PLAN:   1. Encounter for Medicare annual wellness exam  Immunizations are up-to-date.  Colon cancer screening up-to-date.    2. Essential hypertension, benign  Blood pressure at goal.  Continue medications as prescribed.  Due for basic metabolic panel.  - " hydrochlorothiazide (HYDRODIURIL) 12.5 MG tablet; TAKE 1 TABLET EVERY DAY  Dispense: 90 tablet; Refill: 4  - lisinopril (ZESTRIL) 40 MG tablet; Take 1 tablet daily  Dispense: 90 tablet; Refill: 4  - Basic metabolic panel; Future  - Basic metabolic panel    3. Right hip pain  His wife reports that he rarely takes this, they think for itching but they are not sure.  - hydrOXYzine HCl (ATARAX) 10 MG tablet; Take 1 tablet (10 mg) by mouth every 6 hours as needed for itching or anxiety (with pain, moderate pain)  Dispense: 30 tablet; Refill: 1    4. Mixed hyperlipidemia  At goal, no change.  Due for lipid panel today.  - rosuvastatin (CRESTOR) 20 MG tablet; TAKE 1 TABLET (20 MG) BY MOUTH AT BEDTIME  Dispense: 90 tablet; Refill: 4  - Lipid Profile; Future  - Lipid Profile    5. Benign non-nodular prostatic hyperplasia with lower urinary tract symptoms  Has urinary frequency and urgency.  Has had significant elevated PSA levels although stable.  Recommend recheck PSA.  - tamsulosin (FLOMAX) 0.4 MG capsule; TAKE 2 CAPSULES EVERY DAY WITH FOOD  Dispense: 180 capsule; Refill: 4    6. Short-term memory loss  7. Brain injury with loss of consciousness, sequela (H24)  8. Hydrocephalus, unspecified type (H)  9. Cerebral ventriculomegaly  10. SAH (subarachnoid hemorrhage) (H)  11. Brain injury with loss of consciousness, subsequent encounter  He does not have a  shunt.  I see no indication for follow-up with neurosurgery.  He has no symptoms of hydrocephalus including no headache or vision changes.  He has been driving and his wife reports he has a safe .  He also tells me that he has no memory from anything since the year 2000 to yesterday.  I recommend against driving until he can pass an occupational therapy driving assessment.  - Occupational Therapy Referral; Future    12. Benign prostatic hyperplasia with urinary frequency  - PSA tumor marker; Future  - PSA tumor marker    13. Screening, anemia, deficiency, iron  -  "CBC with platelets; Future  - CBC with platelets    14. Elevated glucose level  - Hemoglobin A1c; Future  - Hemoglobin A1c        Patient Instructions    -- Try nasal saline irrigation (with distilled water)  Nasal saline spray  NeilMed sinus rinse  Neti pot   -- Shower/bathe before bed to wash pollen away   -- Elevate head of bed to facilitate sinus drainage   -- Consider getting a HEPA filter   -- Use a cool mist humidifier during the dry season, clean weekly with vinegar     -- No driving   -- OT driving assessment         Signed, Harrison Arguelles MD, FAAP, FACP  Internal Medicine & Pediatrics        Counseling  Reviewed preventive health counseling, as reflected in patient instructions      BMI  Estimated body mass index is 29.09 kg/m  as calculated from the following:    Height as of this encounter: 1.689 m (5' 6.5\").    Weight as of this encounter: 83 kg (183 lb).         He reports that he quit smoking about 54 years ago. He has never used smokeless tobacco.      Appropriate preventive services were discussed with this patient, including applicable screening as appropriate for fall prevention, nutrition, physical activity, Tobacco-use cessation, weight loss and cognition.  Checklist reviewing preventive services available has been given to the patient.    Reviewed patients plan of care and provided an AVS. The Basic Care Plan (routine screening as documented in Health Maintenance) for Chao meets the Care Plan requirement. This Care Plan has been established and reviewed with the Patient.          Signed Electronically by: Harrison Arguelles MD    Identified Health Risks  I have reviewed Opioid Use Disorder and Substance Use Disorder risk factors and made any needed referrals.   Answers submitted by the patient for this visit:  Annual Preventive Visit (Submitted on 1/26/2024)  Chief Complaint: Annual Exam:  Blood in stool: No  heartburn: No  peripheral edema: No  mood changes: No  Skin sensation changes: " No  impotence: No

## 2024-01-26 NOTE — PATIENT INSTRUCTIONS
-- Try nasal saline irrigation (with distilled water)  Nasal saline spray  NeilMed sinus rinse  Neti pot   -- Shower/bathe before bed to wash pollen away   -- Elevate head of bed to facilitate sinus drainage   -- Consider getting a HEPA filter   -- Use a cool mist humidifier during the dry season, clean weekly with vinegar     -- No driving   -- OT driving assessment        Patient Education   Personalized Prevention Plan  You are due for the preventive services outlined below.  Your care team is available to assist you in scheduling these services.  If you have already completed any of these items, please share that information with your care team to update in your medical record.  There are no preventive care reminders to display for this patient.

## 2024-02-05 ENCOUNTER — OFFICE VISIT (OUTPATIENT)
Dept: FAMILY MEDICINE | Facility: OTHER | Age: 79
End: 2024-02-05
Attending: FAMILY MEDICINE
Payer: COMMERCIAL

## 2024-02-05 VITALS
SYSTOLIC BLOOD PRESSURE: 120 MMHG | BODY MASS INDEX: 29.41 KG/M2 | TEMPERATURE: 97.9 F | RESPIRATION RATE: 16 BRPM | WEIGHT: 187.4 LBS | HEART RATE: 71 BPM | HEIGHT: 67 IN | OXYGEN SATURATION: 97 % | DIASTOLIC BLOOD PRESSURE: 70 MMHG

## 2024-02-05 DIAGNOSIS — I60.9 SUBARACHNOID HEMORRHAGE (H): Primary | ICD-10-CM

## 2024-02-05 DIAGNOSIS — R41.3 POOR SHORT TERM MEMORY: ICD-10-CM

## 2024-02-05 PROCEDURE — G0463 HOSPITAL OUTPT CLINIC VISIT: HCPCS

## 2024-02-05 PROCEDURE — 99213 OFFICE O/P EST LOW 20 MIN: CPT | Performed by: FAMILY MEDICINE

## 2024-02-05 ASSESSMENT — PAIN SCALES - GENERAL: PAINLEVEL: NO PAIN (0)

## 2024-02-05 NOTE — PROGRESS NOTES
"      Damaos Guidry is a 78 year old, presenting for the following health issues:  RECHECK (Memory issues)      2/5/2024     9:14 AM   Additional Questions   Roomed by Manyd Mcfarlane LPN     History of Present Illness       Reason for visit:  Discuss possibility of 's license being removed    He eats 2-3 servings of fruits and vegetables daily.He consumes 1 sweetened beverage(s) daily.He exercises with enough effort to increase his heart rate 60 or more minutes per day.  He exercises with enough effort to increase his heart rate 6 days per week.   He is taking medications regularly.                     Objective    /70   Pulse 71   Temp 97.9  F (36.6  C) (Temporal)   Resp 16   Ht 1.702 m (5' 7\")   Wt 85 kg (187 lb 6.4 oz)   SpO2 97%   BMI 29.35 kg/m    Body mass index is 29.35 kg/m .  Physical Exam               Signed Electronically by: Chan Cook MD    "

## 2024-02-05 NOTE — PROGRESS NOTES
"Nursing Notes:   Mandy Mcfarlane LPN  2/5/2024  9:20 AM  Sign at exiting of workspace  Chief Complaint   Patient presents with    RECHECK     Memory issues       Initial /70   Pulse 71   Temp 97.9  F (36.6  C) (Temporal)   Resp 16   Ht 1.702 m (5' 7\")   Wt 85 kg (187 lb 6.4 oz)   SpO2 97%   BMI 29.35 kg/m   Estimated body mass index is 29.35 kg/m  as calculated from the following:    Height as of this encounter: 1.702 m (5' 7\").    Weight as of this encounter: 85 kg (187 lb 6.4 oz).  Medication Review: complete    The next two questions are to help us understand your food security.  If you are feeling you need any assistance in this area, we have resources available to support you today.          1/26/2024   SDOH- Food Insecurity   Within the past 12 months, did you worry that your food would run out before you got money to buy more? N   Within the past 12 months, did the food you bought just not last and you didn t have money to get more? N         Health Care Directive:  Patient does not have a Health Care Directive or Living Will: Discussed advance care planning with patient; however, patient declined at this time.    Mandy Mcfarlane LPN      SUBJECTIVE:  Chao Kearney  is a 78 year old male who comes in today for follow-up.  He had a brain injury when he had a hemorrhagic stroke/subarachnoid hemorrhage a number of years ago.  He had significant memory loss and then had gradual improvement over the years.  He still struggles with his short-term memory.  After his extensive hospitalization 9 years ago, he underwent occupational therapy driving assessment and was felt to be safe to drive a vehicle.  He has driven for years.  His long-term memory is good.  His short-term attentiveness is good and in fact plays duplicate bridge and winds frequently.  He has lived here for many years his nose is way around and does not get lost.  His wife assures me that functionally he is a very safe  and she " has no worries about him being in the car either by himself or with her.  He has had 9 years of driving with this exact same condition and has not had any issues.  It is my opinion that because of his unique situation, that he is safe to operate a motor vehicle.  It is obviously impossible to guarantee that he will not have an accident, but I think a 9-year driving record and previous passing of an occupational therapy assessment is reasonable.  I do not see any reason to repeat that at this time as he had no change in his condition.  Communicated this to the patient and his wife.    Past Medical, Family, and Social History reviewed and updated as noted below.   ROS is negative except as noted above       Allergies   Allergen Reactions    Fluorescein-Benoxinate Other (See Comments)     Does not recall reaction   ,   Family History   Problem Relation Age of Onset    Cancer Mother 78        Cancer, lung cancer at age 78, developed brain metastases    Heart Disease Father 50        Heart Disease,heart failure    Other - See Comments Father          MS, long-standing    Cancer Paternal Grandfather         Cancer, of cancer of unknown type    Other - See Comments Sister          MS   ,   Current Outpatient Medications   Medication    aspirin 81 MG EC tablet    Cholecalciferol (VITAMIN D3) 1000 UNITS CAPS    hydrochlorothiazide (HYDRODIURIL) 12.5 MG tablet    hydrOXYzine HCl (ATARAX) 10 MG tablet    lisinopril (ZESTRIL) 40 MG tablet    meclizine (ANTIVERT) 12.5 MG tablet    Multiple Vitamins-Minerals (MULTILEX-T&M) TABS    rosuvastatin (CRESTOR) 20 MG tablet    tamsulosin (FLOMAX) 0.4 MG capsule    triamcinolone (KENALOG) 0.1 % external cream     No current facility-administered medications for this visit.   ,   Past Medical History:   Diagnosis Date    Actinic keratosis     No Comments Provided    Anemia     No Comments Provided    Diverticulosis of large intestine without perforation or abscess without bleeding      No Comments Provided    Elevated prostate specific antigen (PSA)     No Comments Provided    Essential (primary) hypertension     No Comments Provided    Generalized anxiety disorder     No Comments Provided    History of colonic polyps     No Comments Provided    Hydrocephalus (H)     (obstructive)    Hyperlipidemia     No Comments Provided    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 Marin 1/24/05    Nontraumatic subarachnoid hemorrhage (H)     No Comments Provided    Osteoarthritis of hip     No Comments Provided    Other seborrheic keratosis     No Comments Provided    Other specified disorders of bone, unspecified site (CODE)     1/05,Lingual sean, bony growth, lower mandible, evaluation Dr. Marin 1/05    Pain in right hip     No Comments Provided    Personal history of other medical treatment (CODE)     05/16/05,Q wave in 3 and AVF leads on electrocardiogram done    Plantar fascial fibromatosis     No Comments Provided    Retention of urine     No Comments Provided    Screening for other and unspecified cardiovascular conditions     05/20/2005,Stress echocardiogram is negative for ischemia with ejection fraction of 85%    Stiffness of unspecified joint, not elsewhere classified     No Comments Provided   ,   Patient Active Problem List    Diagnosis Date Noted    Cerebral ventriculomegaly 01/26/2024     Priority: Medium    Short-term memory loss 01/26/2024     Priority: Medium    Pre-diabetes 01/26/2024     Priority: Medium    Generalized anxiety disorder 01/17/2018     Priority: Medium    Elevated prostate specific antigen (PSA) 01/17/2018     Priority: Medium     Overview:   in October 2005.  The patient is taking Levaquin daily for two months.  He   will have another PSA checked in October 2006, recommended by Dr. Wilson.      Hyperlipidemia 01/17/2018     Priority: Medium     Essential hypertension, benign 2018     Priority: Medium    Plantar fasciitis 2018     Priority: Medium     Overview:   right      Benign non-nodular prostatic hyperplasia with lower urinary tract symptoms 2016     Priority: Medium    Brain injury (H) 2015     Priority: Medium    Retention of urine 2014     Priority: Medium    Right hip pain 10/10/2014     Priority: Medium    Anemia 2014     Priority: Medium    Hydrocephalus (H) 2014     Priority: Medium    SAH (subarachnoid hemorrhage) (H) 2014     Priority: Medium    Actinic keratosis 2014     Priority: Medium    Seborrheic keratosis 2014     Priority: Medium    H/O adenomatous polyp of colon 2010     Priority: Medium    Diverticulosis of sigmoid colon 2010     Priority: Medium   ,   Past Surgical History:   Procedure Laterality Date    ARTHROPLASTY HIP ANTERIOR Right 2022    Procedure: ARTHROPLASTY, HIP, TOTAL, DIRECT ANTERIOR APPROACH;  Surgeon: Luis Miguel Lama MD;  Location: [facility] OR    COLONOSCOPY  2010    (2010),F/U     COLONOSCOPY  10/15/2015    10/15/15,F/U     EXTRACAPSULAR CATARACT EXTRATION WITH INTRAOCULAR LENS IMPLANT          OTHER SURGICAL HISTORY      3/26/14,69540.0,SD TOTAL HIP ARTHROPLASTY,Left,direct anterior approach, Dr. Lama    OTHER SURGICAL HISTORY      ,[id number],CRANIOTOMY    OTHER SURGICAL HISTORY      2016,,HERNIA REPAIR,Left,LIH with mesh    and   Social History     Tobacco Use    Smoking status: Former     Types: Cigarettes     Quit date: 1970     Years since quittin.1     Passive exposure: Past    Smokeless tobacco: Never    Tobacco comments:     smoked off and on for 2 years   Substance Use Topics    Alcohol use: Yes     Alcohol/week: 2.0 standard drinks of alcohol     Types: 2 Cans of beer per week     Comment: 2 times a week/2 drinks     OBJECTIVE:  /70   Pulse 71   Temp 97.9  F (36.6  C) (Temporal)   " Resp 16   Ht 1.702 m (5' 7\")   Wt 85 kg (187 lb 6.4 oz)   SpO2 97%   BMI 29.35 kg/m     EXAM:  Alert cooperative, no distress.  Affect is broad range and appropriate.  Short-term memory is poor but long-term memory is still good.  ASSESSMENT/Plan :    Chao was seen today for recheck.    Diagnoses and all orders for this visit:    Subarachnoid hemorrhage (H), history of    Poor short term memory    He had a brain injury when he had a hemorrhagic stroke/subarachnoid hemorrhage a number of years ago.  He had significant memory loss and then had gradual improvement over the years.  He still struggles with his short-term memory.  After his extensive hospitalization 9 years ago, he underwent occupational therapy driving assessment and was felt to be safe to drive a vehicle.  He has driven for years.  His long-term memory is good.  His short-term attentiveness is good and in fact plays duplicate bridge and winds frequently.  He has lived here for many years his nose is way around and does not get lost.  His wife assures me that functionally he is a very safe  and she has no worries about him being in the car either by himself or with her.  He has had 9 years of driving with this exact same condition and has not had any issues.  It is my opinion that because of his unique situation, that he is safe to operate a motor vehicle.  It is obviously impossible to guarantee that he will not have an accident, but I think a 9-year driving record and previous passing of an occupational therapy assessment is reasonable.  I do not see any reason to repeat that at this time as he had no change in his condition.  Communicated this to the patient and his wife.      Chan Cook MD          Answers submitted by the patient for this visit:  General Questionnaire (Submitted on 1/31/2024)  Chief Complaint: Chronic problems general questions HPI Form  What is the reason for your visit today? : discuss possibility of 's " license being removed  How many servings of fruits and vegetables do you eat daily?: 2-3  On average, how many sweetened beverages do you drink each day (Examples: soda, juice, sweet tea, etc.  Do NOT count diet or artificially sweetened beverages)?: 1  How many minutes a day do you exercise enough to make your heart beat faster?: 60 or more  How many days a week do you exercise enough to make your heart beat faster?: 6  How many days per week do you miss taking your medication?: 0

## 2024-02-05 NOTE — NURSING NOTE
"Chief Complaint   Patient presents with    RECHECK     Memory issues       Initial /70   Pulse 71   Temp 97.9  F (36.6  C) (Temporal)   Resp 16   Ht 1.702 m (5' 7\")   Wt 85 kg (187 lb 6.4 oz)   SpO2 97%   BMI 29.35 kg/m   Estimated body mass index is 29.35 kg/m  as calculated from the following:    Height as of this encounter: 1.702 m (5' 7\").    Weight as of this encounter: 85 kg (187 lb 6.4 oz).  Medication Review: complete    The next two questions are to help us understand your food security.  If you are feeling you need any assistance in this area, we have resources available to support you today.          1/26/2024   SDOH- Food Insecurity   Within the past 12 months, did you worry that your food would run out before you got money to buy more? N   Within the past 12 months, did the food you bought just not last and you didn t have money to get more? N         Health Care Directive:  Patient does not have a Health Care Directive or Living Will: Discussed advance care planning with patient; however, patient declined at this time.    Mandy Mcfarlane LPN      "

## 2024-03-02 ENCOUNTER — VIRTUAL VISIT (OUTPATIENT)
Dept: FAMILY MEDICINE | Facility: OTHER | Age: 79
End: 2024-03-02
Payer: COMMERCIAL

## 2024-03-02 DIAGNOSIS — U07.1 COVID-19: Primary | ICD-10-CM

## 2024-03-02 PROCEDURE — 99442 PR PHYSICIAN TELEPHONE EVALUATION 11-20 MIN: CPT | Mod: 93 | Performed by: STUDENT IN AN ORGANIZED HEALTH CARE EDUCATION/TRAINING PROGRAM

## 2024-03-02 NOTE — PROGRESS NOTES
"The patient has been notified of following:     \"This telephone visit will be conducted via a call between you and your physician/provider. We have found that certain health care needs can be provided without the need for a physical exam.  This service lets us provide the care you need with a short phone conversation.  If a prescription is necessary we can send it directly to your pharmacy.  If lab work is needed we can place an order for that and you can then stop by our lab to have the test done at a later time.    If during the course of the call the physician/provider feels a telephone visit is not appropriate, you will not be charged for this service.\"       Subjective     CC: Chao Kearney  is a 78 year old male who presents to clinic today for the following health issues:   Chief Complaint   Patient presents with    URI     COVID          History:  Test today, symptoms started 3/1/24.     Do you have a fever? No    Are you having difficulty breathing? No    Do you have a cough? Yes, it's a dry cough.     Are you experiencing any of the following? None of these    What other symptoms have you experienced? Runny Nose    Do you have any of the following? None of the above    Have you ever been diagnosed with asthma, bronchitis, or lung disease? No    Do you smoke? No     Are there people you know with similar symptoms? Yes     What was the patient(s) diagnosed with? COVID      Not taking OTC medications, drinking fluids. Water.           Review of Systems   Constitutional, HEENT, cardiovascular, pulmonary, gi and gu systems are negative, except as otherwise noted.      Objective    Gen: Patient is alert, oriented  Respiratory: Speaking in full sentences, no audible cough or wheezing        Assessment/Plan:  1. COVID-19    Positive for COVID-19 with home test.  Symptoms started yesterday.  Wife also with COVID.  No difficulty with breathing, tolerating oral intake including water.  Slightly decreased kidney " function as seen on 1/26/2024 BMP.  Typically he does have normal kidney function.  Plan to treat with the renally adjusted dose today.  Discussed to hold his rosuvastatin x 10 days.    - nirmatrelvir and ritonavir (PAXLOVID) 150 mg/100 mg therapy pack; Take 2 tablets by mouth 2 times daily  Dispense: 20 tablet; Refill: 0      Start: 1143 end: 1155  Phone call duration:  12 minutes    Tonja Mayfield PA-C

## 2024-03-03 ENCOUNTER — MYC MEDICAL ADVICE (OUTPATIENT)
Dept: FAMILY MEDICINE | Facility: OTHER | Age: 79
End: 2024-03-03
Payer: COMMERCIAL

## 2024-03-03 DIAGNOSIS — R05.1 ACUTE COUGH: Primary | ICD-10-CM

## 2024-03-04 DIAGNOSIS — N40.0 BPH (BENIGN PROSTATIC HYPERPLASIA): Primary | ICD-10-CM

## 2024-03-04 RX ORDER — BENZONATATE 200 MG/1
200 CAPSULE ORAL 3 TIMES DAILY PRN
Qty: 30 CAPSULE | Refills: 1 | Status: SHIPPED | OUTPATIENT
Start: 2024-03-04 | End: 2024-09-25

## 2024-03-04 NOTE — TELEPHONE ENCOUNTER
Yes, that's fine. I also sent in some Tessalon Perles for him.    Chan Cook MD on 3/4/2024 at 12:32 PM

## 2024-03-04 NOTE — TELEPHONE ENCOUNTER
Dr. Cook:    Chao took 2 pills (Paxlovid) yesterday and then shortly after took 1 more so his wife held his 2 pills for evening dose.  OK to re-start on normal 2 pill BID schedule today?      Jazzmine Garcia RN on 3/4/2024 at 9:08 AM

## 2024-03-07 ENCOUNTER — LAB (OUTPATIENT)
Dept: LAB | Facility: OTHER | Age: 79
End: 2024-03-07
Attending: UROLOGY
Payer: COMMERCIAL

## 2024-03-07 ENCOUNTER — VIRTUAL VISIT (OUTPATIENT)
Dept: UROLOGY | Facility: OTHER | Age: 79
End: 2024-03-07
Attending: INTERNAL MEDICINE
Payer: MEDICARE

## 2024-03-07 VITALS
SYSTOLIC BLOOD PRESSURE: 120 MMHG | DIASTOLIC BLOOD PRESSURE: 70 MMHG | HEART RATE: 60 BPM | RESPIRATION RATE: 20 BRPM | OXYGEN SATURATION: 94 % | WEIGHT: 187 LBS | BODY MASS INDEX: 29.35 KG/M2 | HEIGHT: 67 IN

## 2024-03-07 DIAGNOSIS — N40.1 BENIGN PROSTATIC HYPERPLASIA WITH URINARY FREQUENCY: ICD-10-CM

## 2024-03-07 DIAGNOSIS — D07.5 PIN III (PROSTATIC INTRAEPITHELIAL NEOPLASIA III): ICD-10-CM

## 2024-03-07 DIAGNOSIS — R35.0 BENIGN PROSTATIC HYPERPLASIA WITH URINARY FREQUENCY: ICD-10-CM

## 2024-03-07 DIAGNOSIS — N40.0 BPH (BENIGN PROSTATIC HYPERPLASIA): ICD-10-CM

## 2024-03-07 DIAGNOSIS — R97.20 ELEVATED PROSTATE SPECIFIC ANTIGEN (PSA): ICD-10-CM

## 2024-03-07 DIAGNOSIS — D07.5 PIN III (PROSTATIC INTRAEPITHELIAL NEOPLASIA III): Primary | ICD-10-CM

## 2024-03-07 LAB
ALBUMIN UR-MCNC: NEGATIVE MG/DL
ALBUMIN UR-MCNC: NEGATIVE MG/DL
ANION GAP SERPL CALCULATED.3IONS-SCNC: 10 MMOL/L (ref 7–15)
APPEARANCE UR: CLEAR
APPEARANCE UR: CLEAR
BILIRUB UR QL STRIP: NEGATIVE
BILIRUB UR QL STRIP: NEGATIVE
BUN SERPL-MCNC: 16.3 MG/DL (ref 8–23)
CALCIUM SERPL-MCNC: 9.7 MG/DL (ref 8.8–10.2)
CHLORIDE SERPL-SCNC: 101 MMOL/L (ref 98–107)
COLOR UR AUTO: ABNORMAL
COLOR UR AUTO: ABNORMAL
CREAT SERPL-MCNC: 1.33 MG/DL (ref 0.67–1.17)
DEPRECATED HCO3 PLAS-SCNC: 29 MMOL/L (ref 22–29)
EGFRCR SERPLBLD CKD-EPI 2021: 55 ML/MIN/1.73M2
GLUCOSE SERPL-MCNC: 110 MG/DL (ref 70–99)
GLUCOSE UR STRIP-MCNC: NEGATIVE MG/DL
GLUCOSE UR STRIP-MCNC: NEGATIVE MG/DL
HGB UR QL STRIP: ABNORMAL
HGB UR QL STRIP: ABNORMAL
KETONES UR STRIP-MCNC: NEGATIVE MG/DL
KETONES UR STRIP-MCNC: NEGATIVE MG/DL
LEUKOCYTE ESTERASE UR QL STRIP: NEGATIVE
LEUKOCYTE ESTERASE UR QL STRIP: NEGATIVE
MUCOUS THREADS #/AREA URNS LPF: PRESENT /LPF
NITRATE UR QL: NEGATIVE
NITRATE UR QL: NEGATIVE
PH UR STRIP: 6.5 [PH] (ref 5–9)
PH UR STRIP: 6.5 [PH] (ref 5–9)
POTASSIUM SERPL-SCNC: 4.5 MMOL/L (ref 3.4–5.3)
RBC URINE: 4 /HPF
SODIUM SERPL-SCNC: 140 MMOL/L (ref 135–145)
SP GR UR STRIP: 1.01 (ref 1–1.03)
SP GR UR STRIP: 1.01 (ref 1–1.03)
UROBILINOGEN UR STRIP-MCNC: NORMAL MG/DL
UROBILINOGEN UR STRIP-MCNC: NORMAL MG/DL
WBC URINE: <1 /HPF

## 2024-03-07 PROCEDURE — 51798 US URINE CAPACITY MEASURE: CPT | Mod: GT,95 | Performed by: UROLOGY

## 2024-03-07 PROCEDURE — 84153 ASSAY OF PSA TOTAL: CPT | Mod: ZL | Performed by: UROLOGY

## 2024-03-07 PROCEDURE — 36415 COLL VENOUS BLD VENIPUNCTURE: CPT | Mod: ZL,GT,95 | Performed by: UROLOGY

## 2024-03-07 PROCEDURE — 99214 OFFICE O/P EST MOD 30 MIN: CPT | Performed by: UROLOGY

## 2024-03-07 PROCEDURE — 80048 BASIC METABOLIC PNL TOTAL CA: CPT | Mod: ZL | Performed by: UROLOGY

## 2024-03-07 PROCEDURE — 81001 URINALYSIS AUTO W/SCOPE: CPT | Mod: ZL

## 2024-03-07 PROCEDURE — 81003 URINALYSIS AUTO W/O SCOPE: CPT | Mod: ZL

## 2024-03-07 PROCEDURE — G0463 HOSPITAL OUTPT CLINIC VISIT: HCPCS | Mod: 25,GT

## 2024-03-07 ASSESSMENT — PAIN SCALES - GENERAL: PAINLEVEL: NO PAIN (0)

## 2024-03-07 NOTE — RESULT ENCOUNTER NOTE
Positive for microscopic hematuria.  Will discuss that his upcoming appointment.  May need cystoscopy or CT imaging with a CT urogram.  Dr. Morales

## 2024-03-07 NOTE — PROGRESS NOTES
Type of service:  Video Visit   Video Visit Start Time: 8:23  Video Visit End Time: 9:00    Originating Location (pt. Location): [facility]  Distant Location (provider location): [place], [place]  Platform used for Video Visit: Epic Virtual Visit Platform    I have reviewed the note as documented above.  This accurately captures the substance of my virtual visit with the patient. The patient states an understanding and is agreeable with the plan.   Que Morales MD   Urology     It was my pleasure to meet Mr. Chao Kearney, a 78 year old year old male seen in consultation today for Chief Complaint: Benign Prostatic Hypertrophy (With urinary frequency and elevated PSA )  .    HPI: Mr. Chao Kearney is a 78 year old year old male with a history of elevated PSA status post negative prostate biopsy with Dr. Baptiste in 2016.  2 cores showed high-grade PIN with a calculated prostate volume of 56 cm  at that time.  PSA was 8.7 prior to the biopsy.    He has not been seen since that time nor do I see any record of him being seen at an outlying facility.    He presents today March 7, 2024 virtually for evaluation of continued elevation of his PSA.  Recent PSA was 15.21 in January 2024.    Patient remains on tamsulosin for underlying BPH.  Baseline LUTS include nocturia x 0, no urgency or frequency with weak but adequate stream.      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    Past Medical History:   Diagnosis Date    Actinic keratosis     No Comments Provided    Anemia     No Comments Provided    Diverticulosis of large intestine without perforation or abscess without bleeding     No Comments Provided    Elevated prostate specific antigen (PSA)     No Comments Provided    Essential (primary) hypertension     No Comments Provided    Generalized anxiety disorder     No Comments Provided    History  of colonic polyps     No Comments Provided    Hydrocephalus (H)     (obstructive)    Hyperlipidemia     No Comments Provided    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 Marin 1/24/05    Nontraumatic subarachnoid hemorrhage (H)     No Comments Provided    Osteoarthritis of hip     No Comments Provided    Other seborrheic keratosis     No Comments Provided    Other specified disorders of bone, unspecified site (CODE)     1/05,Lingual sean, bony growth, lower mandible, evaluation Dr. Marin 1/05    Pain in right hip     No Comments Provided    Personal history of other medical treatment (CODE)     05/16/05,Q wave in 3 and AVF leads on electrocardiogram done    Plantar fascial fibromatosis     No Comments Provided    Retention of urine     No Comments Provided    Screening for other and unspecified cardiovascular conditions     05/20/2005,Stress echocardiogram is negative for ischemia with ejection fraction of 85%    Stiffness of unspecified joint, not elsewhere classified     No Comments Provided       Past Surgical History:   Procedure Laterality Date    ARTHROPLASTY HIP ANTERIOR Right 5/17/2022    Procedure: ARTHROPLASTY, HIP, TOTAL, DIRECT ANTERIOR APPROACH;  Surgeon: Luis Miguel Lama MD;  Location:  OR    COLONOSCOPY  04/26/2010    (04/26/2010),F/U 2015    COLONOSCOPY  10/15/2015    10/15/15,F/U 2025    EXTRACAPSULAR CATARACT EXTRATION WITH INTRAOCULAR LENS IMPLANT      2016    OTHER SURGICAL HISTORY      3/26/14,46023.0,LA TOTAL HIP ARTHROPLASTY,Left,direct anterior approach, Dr. Lama    OTHER SURGICAL HISTORY      2014,[id number],CRANIOTOMY    OTHER SURGICAL HISTORY      11/29/2016,,HERNIA REPAIR,Left,LIH with mesh       FAMILY HISTORY: Denies a family history of prostate cancer.      SOCIAL HISTORY:    reports that he quit smoking about 54 years ago. His smoking use included  "cigarettes. He has been exposed to tobacco smoke. He has never used smokeless tobacco.    Current Outpatient Medications   Medication Sig Dispense Refill    aspirin 81 MG EC tablet Take 1 tablet (81 mg) by mouth 2 times daily 60 tablet 0    benzonatate (TESSALON) 200 MG capsule Take 1 capsule (200 mg) by mouth 3 times daily as needed for cough 30 capsule 1    Cholecalciferol (VITAMIN D3) 1000 UNITS CAPS Take 1 capsule by mouth daily      hydrochlorothiazide (HYDRODIURIL) 12.5 MG tablet TAKE 1 TABLET EVERY DAY 90 tablet 4    hydrOXYzine HCl (ATARAX) 10 MG tablet Take 1 tablet (10 mg) by mouth every 6 hours as needed for itching or anxiety (with pain, moderate pain) 30 tablet 1    lisinopril (ZESTRIL) 40 MG tablet Take 1 tablet daily 90 tablet 4    meclizine (ANTIVERT) 12.5 MG tablet TAKE 2 TABLETS(25 MG) BY MOUTH FOUR TIMES DAILY AS NEEDED FOR DIZZINESS 20 tablet 3    Multiple Vitamins-Minerals (MULTILEX-T&M) TABS Take 1 tablet by mouth daily      nirmatrelvir and ritonavir (PAXLOVID) 150 mg/100 mg therapy pack Take 2 tablets by mouth 2 times daily 20 tablet 0    rosuvastatin (CRESTOR) 20 MG tablet TAKE 1 TABLET (20 MG) BY MOUTH AT BEDTIME 90 tablet 4    tamsulosin (FLOMAX) 0.4 MG capsule TAKE 2 CAPSULES EVERY DAY WITH FOOD 180 capsule 4    triamcinolone (KENALOG) 0.1 % external cream Apply topically to affected area(s) 3 times daily. 30 g 1       ALLERGIES: Fluorescein-benoxinate      REVIEW OF SYSTEMS:  Skin: negative  Eyes: negative  Ears/Nose/Throat: negative  Respiratory: No shortness of breath, dyspnea on exertion, cough, or hemoptysis  Cardiovascular: negative  Gastrointestinal: negative  Genitourinary: negative  Musculoskeletal: negative  Neurologic: negative  Psychiatric: negative  Hematologic/Lymphatic/Immunologic: negative  Endocrine: negative     AUA= 0  post void residual=68ml    GENERAL PHYSICAL EXAM:   Vitals: /70   Pulse 60   Resp 20   Ht 1.702 m (5' 7\")   Wt 84.8 kg (187 lb)   SpO2 94%   " BMI 29.29 kg/m    Body mass index is 29.29 kg/m .    GENERAL: Well groomed, well developed, well nourished male in NAD.  HEAD: Normocephalic.   ENT: No jaundice   RESPIRATORY: Normal respiratory effort.    MS: Visibly moving UE well.   NEURO: Alert and oriented x 3.  PSYCH: Normal mood and affect, pleasant and agreeable during interview and exam.    PVR: Residual urine by ultrasound was 76 ml.      Pathology  8/15/2016  Negative 12 cores out of 12 cores      (2 cores HGPIN)  Calculated prostate volume based on TRUS:            56 grams     RADIOLOGY: The following tests were reviewed: None    LABS: The last test results for Ms. Chao Kearney were reviewed.   Results for orders placed or performed in visit on 03/07/24 (from the past 24 hour(s))   Basic metabolic panel   Result Value Ref Range    Sodium 140 135 - 145 mmol/L    Potassium 4.5 3.4 - 5.3 mmol/L    Chloride 101 98 - 107 mmol/L    Carbon Dioxide (CO2) 29 22 - 29 mmol/L    Anion Gap 10 7 - 15 mmol/L    Urea Nitrogen 16.3 8.0 - 23.0 mg/dL    Creatinine 1.33 (H) 0.67 - 1.17 mg/dL    GFR Estimate 55 (L) >60 mL/min/1.73m2    Calcium 9.7 8.8 - 10.2 mg/dL    Glucose 110 (H) 70 - 99 mg/dL       PSA -   Lab Results   Component Value Date    PSA 15.21 01/26/2024    PSA 7.81 10/11/2022    PSA 6.26 09/21/2021     BMP -   Recent Labs   Lab Test 03/07/24  0902 01/26/24  1146 10/11/22  1144 08/03/20  1011 08/07/19  2300    138 139   < > 137   POTASSIUM 4.5 4.1 4.4   < > 3.6   CHLORIDE 101 101 103   < > 101   CO2 29 27 32*   < > 29   BUN 16.3 23.9* 17   < > 19   CR 1.33* 1.24* 1.08   < > 1.18   * 106* 93   < > 114*   MARCO 9.7 10.0 9.8   < > 9.2   MAG  --   --   --   --  2.3    < > = values in this interval not displayed.       CBC -   Recent Labs   Lab Test 01/26/24  1146 10/11/22  1144 05/20/22  1407   WBC 6.6 6.1 9.6   HGB 14.9 14.4 11.1*    190 174       ASSESSMENT:   Elevated PSA  History of prostate biopsy  High-grade PIN  BPH with LUTS    PLAN:    78-year-old male who per AUA and [place] preventative joint task for guidelines falls outside of prostate cancer screening guidelines.  Despite that PSA was checked and found to be elevated.    Recent PSA was over 15.  His baseline has been closer to 6 to 7 ng/mL.    This could be erroneous therefore I recommend we recheck the PSA today to verify.    Explained the differential of the elevated PSA including prostate cancer, lab error, enlarged prostate which he appears to have given his history as well as unknown.    Discussed the possibility of him needing a prostate biopsy with its risks of bleeding and infection.    Discussed an alternative including MRI testing as well as genetic testing with [facility].    He and his wife have agreed to retesting of the PSA with MRI if it still elevated before pursuing any type of biopsy.    UA does show trace of blood.  Micro UA confirms this.  We will discuss possible cystoscopy and CT urogram at his next appointment.    25 minutes spent on the date of this encounter doing chart review, history and exam, documentation and further activities as noted above.      Que Morales MD   [facility]

## 2024-03-07 NOTE — NURSING NOTE
"Chief Complaint   Patient presents with    Benign Prostatic Hypertrophy     With urinary frequency and elevated PSA      Review Of Systems  Skin: negative  Eyes: negative  Ears/Nose/Throat: negative  Respiratory: No shortness of breath, dyspnea on exertion, cough, or hemoptysis  Cardiovascular: negative  Gastrointestinal: negative  Genitourinary: negative  Musculoskeletal: negative  Neurologic: negative  Psychiatric: negative  Hematologic/Lymphatic/Immunologic: negative  Endocrine: negative    AUA= 0  post void residual=68ml      Initial /70   Pulse 60   Resp 20   Ht 1.702 m (5' 7\")   Wt 84.8 kg (187 lb)   SpO2 94%   BMI 29.29 kg/m   Estimated body mass index is 29.29 kg/m  as calculated from the following:    Height as of this encounter: 1.702 m (5' 7\").    Weight as of this encounter: 84.8 kg (187 lb).  Medication Review: complete    The next two questions are to help us understand your food security.  If you are feeling you need any assistance in this area, we have resources available to support you today.          1/26/2024   SDOH- Food Insecurity   Within the past 12 months, did you worry that your food would run out before you got money to buy more? N   Within the past 12 months, did the food you bought just not last and you didn t have money to get more? N         Health Care Directive:  Patient does not have a Health Care Directive or Living Will: Discussed advance care planning with patient; however, patient declined at this time.    Carol Mary, NADER      "

## 2024-03-08 LAB
PSA FREE MFR SERPL: 25.69 %
PSA FREE SERPL-MCNC: 3.7 NG/ML
PSA SERPL DL<=0.01 NG/ML-MCNC: 14.4 NG/ML (ref 0–6.5)

## 2024-03-11 NOTE — RESULT ENCOUNTER NOTE
Chao, your PSA is still elevated.  I agree with the MRI to further assess with discussion when you follow-up.  Dr. Morales

## 2024-03-15 ENCOUNTER — DOCUMENTATION ONLY (OUTPATIENT)
Dept: OTHER | Facility: CLINIC | Age: 79
End: 2024-03-15
Payer: COMMERCIAL

## 2024-03-19 ENCOUNTER — HOSPITAL ENCOUNTER (OUTPATIENT)
Dept: MRI IMAGING | Facility: OTHER | Age: 79
Discharge: HOME | End: 2024-03-19
Attending: UROLOGY | Admitting: UROLOGY
Payer: MEDICARE

## 2024-03-19 DIAGNOSIS — R97.20 ELEVATED PROSTATE SPECIFIC ANTIGEN (PSA): ICD-10-CM

## 2024-03-19 PROCEDURE — A9575 INJ GADOTERATE MEGLUMI 0.1ML: HCPCS | Mod: JZ | Performed by: UROLOGY

## 2024-03-19 PROCEDURE — 255N000002 HC RX 255 OP 636: Mod: JZ | Performed by: UROLOGY

## 2024-03-19 PROCEDURE — 76377 3D RENDER W/INTRP POSTPROCES: CPT

## 2024-03-19 RX ORDER — GADOTERATE MEGLUMINE 376.9 MG/ML
20 INJECTION INTRAVENOUS ONCE
Status: COMPLETED | OUTPATIENT
Start: 2024-03-19 | End: 2024-03-19

## 2024-03-19 RX ADMIN — GADOTERATE MEGLUMINE 16 ML: 376.9 INJECTION INTRAVENOUS at 18:51

## 2024-03-20 ENCOUNTER — MYC MEDICAL ADVICE (OUTPATIENT)
Dept: FAMILY MEDICINE | Facility: OTHER | Age: 79
End: 2024-03-20
Payer: COMMERCIAL

## 2024-03-20 NOTE — TELEPHONE ENCOUNTER
4/18 OV with Andrew.     3/19/24:  MRI of Prostate    Jazzmine Garcia RN on 3/20/2024 at 1:31 PM

## 2024-03-20 NOTE — RESULT ENCOUNTER NOTE
Results discussed.  PI-RADS 4 which gives a higher likelihood of prostate cancer being present.  However he follows outside of AUA guidelines.  Although prostate biopsy will be recommended the risk may be greater than the benefit.    I would like to see him in April to do an exam of his prostate.  At that point if his exam is normal we could talk about watchful waiting with a repeat PSA in 6 months and exam versus doing a biopsy.    He is inclined to just watch but we will discuss in April.  Thanks Dr. Morales

## 2024-04-12 DIAGNOSIS — R42 VERTIGO: ICD-10-CM

## 2024-04-17 RX ORDER — MECLIZINE HCL 12.5 MG 12.5 MG/1
TABLET ORAL
Qty: 20 TABLET | Refills: 0 | Status: SHIPPED | OUTPATIENT
Start: 2024-04-17

## 2024-04-17 NOTE — TELEPHONE ENCOUNTER
Man sent Rx request for the following:      Requested Prescriptions   Pending Prescriptions Disp Refills    meclizine (ANTIVERT) 12.5 MG tablet [Pharmacy Med Name: MECLIZINE HYDROCHLORIDE 12.5 MG Tablet] 20 tablet 3     Sig: TAKE 2 TABLETS FOUR TIMES DAILY AS NEEDED FOR DIZZINESS        Antivertigo/Antiemetic Agents Passed - 4/17/2024  9:05 AM        Passed - Medication is active on med list        Passed - Medication indicated for associated diagnosis     The medication is prescribed for one or more of the following conditions:     Motion sickness   Nausea   Vomiting   Vertigo   Chemotherapy-induced nausea and vomiting   Radiation-induced nausea and vomiting,    Nausea and vomiting prophylaxis, migraine          Passed - Recent (12 mo) or future (90 days) visit with authorizing provider's specialty     The patient must have completed an in-person or virtual visit within the past 12 months or has a future visit scheduled within the next 90 days with the authorizing provider s specialty.  Urgent care and e-visits do not quality as an office visit for this protocol.          Passed - Patient is 18 years of age or older             Last Prescription Date:   1/31/23  Last Fill Qty/Refills:         20, R-3    Last Office Visit:              2/5/24   Future Office visit:           none    Prescription approved per [facility] Refill Protocol.    Mariely Blake RN on 4/17/2024 at 9:07 AM

## 2024-04-18 ENCOUNTER — TELEPHONE (OUTPATIENT)
Dept: UROLOGY | Facility: OTHER | Age: 79
End: 2024-04-18

## 2024-04-18 ENCOUNTER — OFFICE VISIT (OUTPATIENT)
Dept: UROLOGY | Facility: OTHER | Age: 79
End: 2024-04-18
Attending: UROLOGY
Payer: COMMERCIAL

## 2024-04-18 VITALS
HEART RATE: 70 BPM | OXYGEN SATURATION: 98 % | HEIGHT: 67 IN | DIASTOLIC BLOOD PRESSURE: 70 MMHG | SYSTOLIC BLOOD PRESSURE: 122 MMHG | BODY MASS INDEX: 28.88 KG/M2 | WEIGHT: 184 LBS | RESPIRATION RATE: 20 BRPM

## 2024-04-18 DIAGNOSIS — D07.5 PIN III (PROSTATIC INTRAEPITHELIAL NEOPLASIA III): ICD-10-CM

## 2024-04-18 DIAGNOSIS — R93.5 ABNORMAL MRI, PELVIS: ICD-10-CM

## 2024-04-18 DIAGNOSIS — N40.1 BENIGN PROSTATIC HYPERPLASIA WITH URINARY FREQUENCY: ICD-10-CM

## 2024-04-18 DIAGNOSIS — R97.20 ELEVATED PROSTATE SPECIFIC ANTIGEN (PSA): Primary | ICD-10-CM

## 2024-04-18 DIAGNOSIS — R35.0 BENIGN PROSTATIC HYPERPLASIA WITH URINARY FREQUENCY: ICD-10-CM

## 2024-04-18 LAB
Lab: NORMAL
PERFORMING LABORATORY: NORMAL
SPECIMEN STATUS: NORMAL
TEST NAME: NORMAL

## 2024-04-18 PROCEDURE — 99214 OFFICE O/P EST MOD 30 MIN: CPT | Performed by: UROLOGY

## 2024-04-18 PROCEDURE — G0463 HOSPITAL OUTPT CLINIC VISIT: HCPCS

## 2024-04-18 PROCEDURE — 87081 CULTURE SCREEN ONLY: CPT | Mod: ZL | Performed by: UROLOGY

## 2024-04-18 NOTE — PROGRESS NOTES
Chief Complaint: Results (Review MRI of prostate )  .    HPI: Mr. Chao Kearney is a 78 year old year old male presenting today April 18, 2024 in follow up for evaluation of an elevated PSA.    Seen on 3/7/2024 for a PSA of 15.21 in January 2024.    He had had a negative biopsy with Dr. Baptiste in 2016 although 2 cores showed high-grade PIN with a calculated prostate volume of 56 cm  at that time.  PSA was 8.7 just prior to the biopsy.    He remains on tamsulosin for BPH with nocturia x 0 and very little urgency or frequency.  We stream is weak but adequate.    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    Here today 4/18/2024 to discuss his MRI results.  This showed a PI-RADS 4 lesion concerning for prostate cancer.  Percent free PSA was favorable at 25.69 with a slight improvement of his PSA to 14.4.  That put his risk at biopsy at his age at 16% which is low.    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 Marin 1/24/05    Nontraumatic subarachnoid hemorrhage (H)     No Comments Provided    Osteoarthritis of hip     No Comments Provided    Other seborrheic keratosis     No Comments Provided    Other specified disorders of bone, unspecified site (CODE)      1/05,Lingual sean, bony growth, lower mandible, evaluation Dr. Marin 1/05    Pain in right hip     No Comments Provided    Personal history of other medical treatment (CODE)     05/16/05,Q wave in 3 and AVF leads on electrocardiogram done    Plantar fascial fibromatosis     No Comments Provided    Retention of urine     No Comments Provided    Screening for other and unspecified cardiovascular conditions     05/20/2005,Stress echocardiogram is negative for ischemia with ejection fraction of 85%    Stiffness of unspecified joint, not elsewhere classified     No Comments Provided       Past Surgical History:   Procedure Laterality Date    ARTHROPLASTY HIP ANTERIOR Right 5/17/2022    Procedure: ARTHROPLASTY, HIP, TOTAL, DIRECT ANTERIOR APPROACH;  Surgeon: Luis Miguel Lama MD;  Location: [facility] OR    COLONOSCOPY  04/26/2010    (04/26/2010),F/U 2015    COLONOSCOPY  10/15/2015    10/15/15,F/U 2025    EXTRACAPSULAR CATARACT EXTRATION WITH INTRAOCULAR LENS IMPLANT      2016    OTHER SURGICAL HISTORY      3/26/14,75581.0,[place] TOTAL HIP ARTHROPLASTY,Left,direct anterior approach, Dr. Lama    OTHER SURGICAL HISTORY      2014,[id number],CRANIOTOMY    OTHER SURGICAL HISTORY      11/29/2016,,HERNIA REPAIR,Left,LIH with mesh       Current Outpatient Medications   Medication Sig Dispense Refill    aspirin 81 MG EC tablet Take 1 tablet (81 mg) by mouth 2 times daily 60 tablet 0    benzonatate (TESSALON) 200 MG capsule Take 1 capsule (200 mg) by mouth 3 times daily as needed for cough 30 capsule 1    Cholecalciferol (VITAMIN D3) 1000 UNITS CAPS Take 1 capsule by mouth daily      hydrochlorothiazide (HYDRODIURIL) 12.5 MG tablet TAKE 1 TABLET EVERY DAY 90 tablet 4    hydrOXYzine HCl (ATARAX) 10 MG tablet Take 1 tablet (10 mg) by mouth every 6 hours as needed for itching or anxiety (with pain, moderate pain) 30 tablet 1    lisinopril (ZESTRIL) 40 MG tablet Take 1 tablet daily 90 tablet 4    meclizine (ANTIVERT) 12.5 MG tablet TAKE 2  "TABLETS FOUR TIMES DAILY AS NEEDED FOR DIZZINESS 20 tablet 0    Multiple Vitamins-Minerals (MULTILEX-T&M) TABS Take 1 tablet by mouth daily      nirmatrelvir and ritonavir (PAXLOVID) 150 mg/100 mg therapy pack Take 2 tablets by mouth 2 times daily 20 tablet 0    rosuvastatin (CRESTOR) 20 MG tablet TAKE 1 TABLET (20 MG) BY MOUTH AT BEDTIME 90 tablet 4    tamsulosin (FLOMAX) 0.4 MG capsule TAKE 2 CAPSULES EVERY DAY WITH FOOD 180 capsule 4    triamcinolone (KENALOG) 0.1 % external cream Apply topically to affected area(s) 3 times daily. 30 g 1       ALLERGIES: Fluorescein-benoxinate     GENERAL PHYSICAL EXAM:   Vitals: /70   Pulse 70   Resp 20   Ht 1.702 m (5' 7\")   Wt 83.5 kg (184 lb)   SpO2 98%   BMI 28.82 kg/m    Body mass index is 28.82 kg/m .    GENERAL: Well groomed, well developed, well nourished male in NAD.  ENT:  ENT exam normal  RESPIRATORY: Normal respiratory effort.    MS: Moving all 4  NEURO: Alert and oriented x 3.  PSYCH: Normal mood and affect, pleasant and agreeable during interview and exam.       RADIOLOGY: The following tests were reviewed:   MR PROSTATE  W/O & W CONTRAST     Exam reason: Elevated prostate specific antigen (PSA)     Comparison: None.     Technical Information: 1.5T MR imaging of the prostate including high  resolution 3mm axial and sagittal T2 and axial 3.5mm diffusion  weighted (B0, B100, B800, and ) images through the prostate gland  and seminal vesicles. Dynamic enhanced images of the prostate gland  were also obtained in the axial plane. Axial T1, axial T2, and axial  T1 postcontrast images were also obtained through the entire pelvis.  Images were analyzed using a separate [employer] workstation.     PSA:   14.40 ng/mL on 3/7/2024  15.21 ng/mL on 1/26/2024  7.81 ng/mL on 10/11/2022     Findings:      Prostate gland dimensions: 6.1 x 5.2 x 5.9  cm  Prostate gland volume:  104.12 g, consistent with prostatomegaly.     Hemorrhage: No hemorrhage on T1-weighted " images.     PERIPHERAL ZONE: Lesion  identified in the peripheral zone, as below.     LESION# 1  -Location: Left mid gland at the 5:00 to 6:00 position (image 20,  series 4)  -Size: 0.7 x 0.4 cm  -T2 signal intensity: No suspicious observations seen on T2-weighted  MR imaging (T2 score = 2/5).   -Diffusion: No suspicious findings seen on diffusion-weighted MR  imaging (DWI score = 2/5).   -Dynamic contrast enhancement: No suspicious enhancement identified  (DCE negative).  -Prostate margin: No capsular abutment  -PI-RADS assessment category: 2     TRANSITION ZONE: Lesion identified in the transition zone, as below.  There is nodular expansion and heterogeneous signal throughout the  transition zone, consistent with benign prostate hyperplasia.     LESION# 2  -Location: Left base at the 4:00 to 5:00 position (image 15, series 4)  -Size: 1.5 x 0.9 cm  -T2 signal intensity: On T2-weighted MR imaging, the observation is  seen as a focus of low signal intensity (T2 score = 4/5).  -Diffusion: No suspicious findings seen on diffusion-weighted MR  imaging (DWI score = 2/5).   -Dynamic contrast enhancement: The observation is associated with  suspicious enhancement (DCE positive).  -PI-RADS assessment category: 4     Neurovascular bundles: Not involved.     Seminal vesicles: Normal.     Pelvic lymph nodes: No lymphadenopathy.     OTHER:  Bladder: Within normal limits.  Colon/Rectum: Within normal limits  Free fluid: None.     Bones: No suspicious osseous lesions. Bilateral hip arthritis with  metal artifact limiting evaluation of the surrounding structures.                                                                      IMPRESSION:  1. Based on the most suspicious abnormality, this exam is  characterized as PIRADS 4 - High probability.  Clinically significant  cancer is likely to be present.  The most suspicious abnormality is  located in the transitional zone at the left base.  2. No evidence of extraprostatic  malignancy. No suspicious adenopathy  or evidence of pelvic metastases.       LABS: The last test results for Mr. Chao Kearney were reviewed:  No results found for this or any previous visit (from the past 24 hour(s)).    PSA -   Lab Results   Component Value Date    PSA 14.40 03/07/2024    PSA 15.21 01/26/2024    PSA 7.81 10/11/2022    PSA 6.26 09/21/2021     BMP -   Recent Labs   Lab Test 03/07/24  0902 01/26/24  1146 10/11/22  1144 08/03/20  1011 08/07/19  2300    138 139   < > 137   POTASSIUM 4.5 4.1 4.4   < > 3.6   CHLORIDE 101 101 103   < > 101   CO2 29 27 32*   < > 29   BUN 16.3 23.9* 17   < > 19   CR 1.33* 1.24* 1.08   < > 1.18   * 106* 93   < > 114*   MARCO 9.7 10.0 9.8   < > 9.2   MAG  --   --   --   --  2.3    < > = values in this interval not displayed.       CBC -   Recent Labs   Lab Test 01/26/24  1146 10/11/22  1144 05/20/22  1407   WBC 6.6 6.1 9.6   HGB 14.9 14.4 11.1*    190 174       ASSESSMENT:   Elevated PSA  History of high-grade PIN status post previous prostate biopsy  MRI  BPH without lower urinary tract symptoms    PLAN:   Patient would like to do a biopsy given the MRI results.  PARVEEN reassuring.  % free reassuring.       Rectal swab done.  Must stop aspirin.  Must do a fleet's enema.    Next biopsy.      Patient called later in the afternoon and decided to not do the biopsy.  Plan for follow-up in 6 months with PSA.    38 minutes spent on the date of this encounter doing chart review, history and exam, documentation and further activities as noted above.      Que Morales MD  [facility]

## 2024-04-18 NOTE — NURSING NOTE
"Chief Complaint   Patient presents with    Results     Review MRI of prostate        Initial /70   Pulse 70   Resp 20   Ht 1.702 m (5' 7\")   Wt 83.5 kg (184 lb)   SpO2 98%   BMI 28.82 kg/m   Estimated body mass index is 28.82 kg/m  as calculated from the following:    Height as of this encounter: 1.702 m (5' 7\").    Weight as of this encounter: 83.5 kg (184 lb).  Medication Reconciliation: complete   AUA= 1  Carol Mary LPN    "

## 2024-04-18 NOTE — TELEPHONE ENCOUNTER
Patients spouse, Juana, stated the patient has decided not to have the test discussed at today's appointment and will wait and be on watch. Juana stated the patient will make an appointment with their primary in six months for a PSA test.    Okay to leave detailed message.              Eliza Holley on 4/18/2024 at 10:13 AM

## 2024-04-25 LAB — MISCELLANEOUS TEST 1 (ARUP): NORMAL

## 2024-04-30 DIAGNOSIS — R31.21 ASYMPTOMATIC MICROSCOPIC HEMATURIA: Primary | ICD-10-CM

## 2024-04-30 NOTE — RESULT ENCOUNTER NOTE
Mr. Kearney, I understand you decided to cancer your prostate biopsy.  Completely understandable.    One other concern was slight blood in your urine when we checked.  I was going to discuss this with you at your upcoming appt but this was cancelled.    We typically get xrays such as a CT scan when we see blood in the urine.  This CT scan looks at the kidneys for a source.  Sometimes we look in the bladder as well.    Are you willing to undergo these xrays if I order them?  Please contact the office at [phone] and let us know so we can do so.  Dr. Morales

## 2024-05-17 ENCOUNTER — LAB (OUTPATIENT)
Dept: LAB | Facility: OTHER | Age: 79
End: 2024-05-17
Attending: UROLOGY
Payer: MEDICARE

## 2024-05-17 ENCOUNTER — HOSPITAL ENCOUNTER (OUTPATIENT)
Dept: CT IMAGING | Facility: OTHER | Age: 79
Discharge: HOME | End: 2024-05-17
Attending: UROLOGY
Payer: MEDICARE

## 2024-05-17 DIAGNOSIS — R31.21 ASYMPTOMATIC MICROSCOPIC HEMATURIA: ICD-10-CM

## 2024-05-17 LAB
ANION GAP SERPL CALCULATED.3IONS-SCNC: 10 MMOL/L (ref 7–15)
BUN SERPL-MCNC: 20.3 MG/DL (ref 8–23)
CALCIUM SERPL-MCNC: 9.9 MG/DL (ref 8.8–10.2)
CHLORIDE SERPL-SCNC: 101 MMOL/L (ref 98–107)
CREAT SERPL-MCNC: 1.25 MG/DL (ref 0.67–1.17)
DEPRECATED HCO3 PLAS-SCNC: 28 MMOL/L (ref 22–29)
EGFRCR SERPLBLD CKD-EPI 2021: 59 ML/MIN/1.73M2
GLUCOSE SERPL-MCNC: 105 MG/DL (ref 70–99)
HOLD SPECIMEN: NORMAL
POTASSIUM SERPL-SCNC: 4.6 MMOL/L (ref 3.4–5.3)
SODIUM SERPL-SCNC: 139 MMOL/L (ref 135–145)

## 2024-05-17 PROCEDURE — 82565 ASSAY OF CREATININE: CPT | Mod: ZL

## 2024-05-17 PROCEDURE — 36415 COLL VENOUS BLD VENIPUNCTURE: CPT | Mod: ZL

## 2024-05-17 PROCEDURE — 74178 CT ABD&PLV WO CNTR FLWD CNTR: CPT | Mod: MG

## 2024-05-17 PROCEDURE — 82374 ASSAY BLOOD CARBON DIOXIDE: CPT | Mod: ZL

## 2024-05-17 PROCEDURE — 250N000011 HC RX IP 250 OP 636: Performed by: UROLOGY

## 2024-05-17 RX ORDER — IOPAMIDOL 755 MG/ML
105 INJECTION, SOLUTION INTRAVASCULAR ONCE
Status: COMPLETED | OUTPATIENT
Start: 2024-05-17 | End: 2024-05-17

## 2024-05-17 RX ADMIN — IOPAMIDOL 105 ML: 755 INJECTION, SOLUTION INTRAVENOUS at 14:30

## 2024-05-17 NOTE — PROGRESS NOTES
1.  Has the patient had a previous reaction to IV contrast? No    2.  Does the patient have kidney disease? Yes HX. Gfr today 59    3.  Is the patient on dialysis? No    If YES to any of these questions, exam will be reviewed with a Radiologist before administering contrast.      IV Contrast- Discharge Instructions After Your CT Scan      The IV contrast you received today will be filtered from your bloodstream by your kidneys during the next 24 hours and pass from the body in urine.  You will not be aware of this process and your urine will not change in color.  To help this process you should drink at least 4 additional glasses of water or juice today.  This reduces stress on your kidneys.    Most contrast reactions are immediate.  Should you develop symptoms of concern after discharge, contact the department at the number below.  After hours you should contact your personal physician.  If you develop breathing distress or wheezing, call 911.

## 2024-05-19 NOTE — RESULT ENCOUNTER NOTE
Chao, I looked at your CT scan.  I see only a left lower pole kidney stone.  It is small and not a concern.  Probably too small to treat.  We would just watch for now.      I cannot see the bladder clearly because of your hip surgery.  It obscures your bladder.      I know you didn't want to do the prostate biopsy.   We would typically look in your bladder in this situation to ensure that there is not something we are missing.    Let me know your thoughts and whether you would want me to look or whether you just want to watch this as well given other health concerns?    Dr. Morales

## 2024-05-19 NOTE — RESULT ENCOUNTER NOTE
Dr. Leon, your report on this CT scan looked like it cut off?  I do see a left lower pole renal stone on noncontrast images.  Is it possible to addend your report.    Thanks, Triston Morales, urology

## 2024-05-23 ENCOUNTER — VIRTUAL VISIT (OUTPATIENT)
Dept: UROLOGY | Facility: OTHER | Age: 79
End: 2024-05-23
Attending: UROLOGY
Payer: COMMERCIAL

## 2024-05-23 VITALS
DIASTOLIC BLOOD PRESSURE: 60 MMHG | WEIGHT: 184 LBS | HEART RATE: 61 BPM | RESPIRATION RATE: 14 BRPM | SYSTOLIC BLOOD PRESSURE: 130 MMHG | OXYGEN SATURATION: 98 % | TEMPERATURE: 97.1 F | BODY MASS INDEX: 28.82 KG/M2

## 2024-05-23 DIAGNOSIS — R93.5 ABNORMAL MRI, PELVIS: ICD-10-CM

## 2024-05-23 DIAGNOSIS — N40.1 BENIGN PROSTATIC HYPERPLASIA WITH URINARY FREQUENCY: ICD-10-CM

## 2024-05-23 DIAGNOSIS — D07.5 PIN III (PROSTATIC INTRAEPITHELIAL NEOPLASIA III): ICD-10-CM

## 2024-05-23 DIAGNOSIS — N20.0 RENAL CALCULUS, LEFT: ICD-10-CM

## 2024-05-23 DIAGNOSIS — R35.0 BENIGN PROSTATIC HYPERPLASIA WITH URINARY FREQUENCY: ICD-10-CM

## 2024-05-23 DIAGNOSIS — R97.20 ELEVATED PROSTATE SPECIFIC ANTIGEN (PSA): ICD-10-CM

## 2024-05-23 DIAGNOSIS — R31.21 ASYMPTOMATIC MICROSCOPIC HEMATURIA: Primary | ICD-10-CM

## 2024-05-23 PROCEDURE — 99213 OFFICE O/P EST LOW 20 MIN: CPT | Mod: 95 | Performed by: UROLOGY

## 2024-05-23 NOTE — NURSING NOTE
"Chief Complaint   Patient presents with    Follow Up     Follow up CT/ Lab       Initial /60 (BP Location: Left arm, Patient Position: Sitting, Cuff Size: Adult Regular)   Pulse 61   Temp 97.1  F (36.2  C)   Resp 14   Wt 83.5 kg (184 lb)   SpO2 98%   BMI 28.82 kg/m   Estimated body mass index is 28.82 kg/m  as calculated from the following:    Height as of 4/18/24: 1.702 m (5' 7\").    Weight as of this encounter: 83.5 kg (184 lb).  Medication Reconciliation: complete  Has an irregular heart rate not noted in chart.     Talisha Josue, RN    "

## 2024-05-23 NOTE — PROGRESS NOTES
Type of service:  Video Visit   Video Visit Start Time: 10:35  Video Visit End Time: 10:55     Originating Location (pt. Location): [facility]  Distant Location (provider location): [place], [place]  Platform used for Video Visit: Epic Virtual Visit Platform    I have reviewed the note as documented above.  This accurately captures the substance of my virtual visit with the patient. The patient states an understanding and is agreeable with the plan.   Que Morales MD   Urology     Chief Complaint: No chief complaint on file.  Elevated PSA, abnormal MRI, microhematuria, left renal stone    HPI: Mr. Chao Kearney is a 78 year old year old male presenting today May 23, 2024 virtually in follow up for evaluation of an elevated PSA, abnormal MRI and microhematuria with CT scan showing a small left renal calculus.    Seen April 18, 2024 in follow up for evaluation of an elevated PSA.  Patient elected to not do a prostate biopsy despite the MRI and elevated PSA secondary to health concerns.     PSA of 15.21 in January 2024.     He had had a negative biopsy with Dr. Baptiste in 2016 although 2 cores showed high-grade PIN with a calculated prostate volume of 56 cm  at that time.  PSA was 8.7 just prior to the biopsy.     He remains on tamsulosin for BPH with nocturia x 0 and very little urgency or frequency.  We stream is weak but adequate.     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     MRI showed a PI-RADS 4 lesion concerning for prostate cancer.  Percent free PSA was favorable at 25.69 with a slight improvement of his PSA to 14.4.  That put his risk at biopsy at his age at 16% which is low.    He has a PMH significant for hydrocephalus with previous hemorrhagic stroke /subarachnoid hemorrhage and brain injury, hypertension, hyperlipidemia and prediabetes.     Current Outpatient Medications   Medication Sig  Dispense Refill    aspirin 81 MG EC tablet Take 1 tablet (81 mg) by mouth 2 times daily 60 tablet 0    benzonatate (TESSALON) 200 MG capsule Take 1 capsule (200 mg) by mouth 3 times daily as needed for cough 30 capsule 1    Cholecalciferol (VITAMIN D3) 1000 UNITS CAPS Take 1 capsule by mouth daily      hydrochlorothiazide (HYDRODIURIL) 12.5 MG tablet TAKE 1 TABLET EVERY DAY 90 tablet 4    hydrOXYzine HCl (ATARAX) 10 MG tablet Take 1 tablet (10 mg) by mouth every 6 hours as needed for itching or anxiety (with pain, moderate pain) 30 tablet 1    lisinopril (ZESTRIL) 40 MG tablet Take 1 tablet daily 90 tablet 4    meclizine (ANTIVERT) 12.5 MG tablet TAKE 2 TABLETS FOUR TIMES DAILY AS NEEDED FOR DIZZINESS 20 tablet 0    Multiple Vitamins-Minerals (MULTILEX-T&M) TABS Take 1 tablet by mouth daily      nirmatrelvir and ritonavir (PAXLOVID) 150 mg/100 mg therapy pack Take 2 tablets by mouth 2 times daily 20 tablet 0    rosuvastatin (CRESTOR) 20 MG tablet TAKE 1 TABLET (20 MG) BY MOUTH AT BEDTIME 90 tablet 4    tamsulosin (FLOMAX) 0.4 MG capsule TAKE 2 CAPSULES EVERY DAY WITH FOOD 180 capsule 4    triamcinolone (KENALOG) 0.1 % external cream Apply topically to affected area(s) 3 times daily. 30 g 1       ALLERGIES: Fluorescein-benoxinate     GENERAL PHYSICAL EXAM:   Vitals: There were no vitals taken for this visit.  There is no height or weight on file to calculate BMI.    GENERAL: Well groomed, well developed, well nourished male in [facility].  ENT:  ENT exam normal  RESPIRATORY: Normal respiratory effort.   MS: Visibly moving all four   NEURO: Alert and oriented x 3.  PSYCH: Normal mood and affect, pleasant and agreeable during interview and exam.    LABS: The last test results for Mr. Chao Kearney were reviewed:  No results found for this or any previous visit (from the past 24 hour(s)).    There is a 2 mm in diameter calculus in the lower pole of the left  kidney.     BJ EVANS MD         SYSTEM ID:  [id number]    Addended by Levi Evans MD on 5/20/2024  7:39 AM     Study Result    Narrative & Impression   PROCEDURE: CT UROGRAM WO & W CONTRAST 5/17/2024 2:53 PM     HISTORY: aSymptomatic microscopic hematuria; Asymptomatic microscopic  hematuria     COMPARISONS: None.     Meds/Dose Given: isovue 370 105ml     TECHNIQUE: CT scan of the abdomen and pelvis both with and without IV  contrast     FINDINGS: Lung bases are clear. The liver is free of masses or biliary  ductal enlargement. No calcified gallstones are seen. The spleen and  pancreas appear normal. The adrenal glands are normal.     There are no renal calculi. There are no renal masses. The calyces are  delicate and nondisplaced. The ureters show no evidence of obstruction  and follow a normal course to the bladder. Portions of the bladder  were obscured by streak artifact from the patient's hip prostheses.  The prostate is enlarged.     The periaortic lymph nodes are normal in caliber. No intraperitoneal  masses or inflammatory changes are noted. The rectum appears normal.  Degenerative changes are present in the thoracic and lumbar spine.                                                                        IMPRESSION: Renal calculi. No renal masses or hydronephrosis.     Enlarged prostate     LEVI EVANS MD         SYSTEM ID:  [id number]       PSA -   Lab Results   Component Value Date    PSA 14.40 03/07/2024    PSA 15.21 01/26/2024    PSA 7.81 10/11/2022    PSA 6.26 09/21/2021     BMP -   Recent Labs   Lab Test 05/17/24  1352 03/07/24  0902 01/26/24  1146 08/03/20  1011 08/07/19  2300    140 138   < > 137   POTASSIUM 4.6 4.5 4.1   < > 3.6   CHLORIDE 101 101 101   < > 101   CO2 28 29 27   < > 29   BUN 20.3 16.3 23.9*   < > 19   CR 1.25* 1.33* 1.24*   < > 1.18   * 110* 106*   < > 114*   MARCO 9.9 9.7 10.0   < > 9.2   MAG  --   --   --   --  2.3    < > = values in this interval not displayed.       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  CT urogram reviewed and does demonstrate a left sided stone however artifact from his hips obscures the bladder.  I would recommend cystoscopy given his age.  It is possible that the bleeding is coming just from his prostate given its size.    The issue with his PSA and MRI was brought up.  He is still leaning towards following this however we will revisit the issue in 6 months with a PSA and exam.  Patient and wife agree    All questions addressed    20 hey Antonieta call [facility] mobile minutes spent on the date of this encounter doing chart review, history and exam, documentation and further activities as noted above.        Que Morales MD  [facility]

## 2024-07-31 ENCOUNTER — TELEPHONE (OUTPATIENT)
Dept: UROLOGY | Facility: OTHER | Age: 79
End: 2024-07-31
Payer: COMMERCIAL

## 2024-07-31 DIAGNOSIS — R31.21 ASYMPTOMATIC MICROSCOPIC HEMATURIA: Primary | ICD-10-CM

## 2024-07-31 NOTE — TELEPHONE ENCOUNTER
Needs to be off asa 7 days before procedure on the 15 th. Carol Mary LPN ....................7/31/2024  12:49 PM

## 2024-08-01 DIAGNOSIS — R31.21 ASYMPTOMATIC MICROSCOPIC HEMATURIA: Primary | ICD-10-CM

## 2024-08-05 NOTE — TELEPHONE ENCOUNTER
Talked to his wife and is aware to be off asa 7 days prior, will eat before procedure. Carol Mary LPN ....................8/5/2024  11:37 AM

## 2024-08-29 ENCOUNTER — TELEPHONE (OUTPATIENT)
Dept: UROLOGY | Facility: OTHER | Age: 79
End: 2024-08-29
Payer: COMMERCIAL

## 2024-08-29 DIAGNOSIS — R31.21 ASYMPTOMATIC MICROSCOPIC HEMATURIA: Primary | ICD-10-CM

## 2024-08-30 NOTE — TELEPHONE ENCOUNTER
Left message to call back....................  8/30/2024   1:31 PM  Carol Mary LPN ....................8/30/2024  1:31 PM

## 2024-09-03 NOTE — TELEPHONE ENCOUNTER
Left message to call back....................  9/3/2024   9:06 AM  Carol Mary LPN ....................9/3/2024  9:06 AM

## 2024-09-17 ENCOUNTER — LAB (OUTPATIENT)
Dept: LAB | Facility: OTHER | Age: 79
End: 2024-09-17
Attending: INTERNAL MEDICINE
Payer: MEDICARE

## 2024-09-17 ENCOUNTER — OFFICE VISIT (OUTPATIENT)
Dept: UROLOGY | Facility: OTHER | Age: 79
End: 2024-09-17
Attending: INTERNAL MEDICINE
Payer: MEDICARE

## 2024-09-17 DIAGNOSIS — N13.8 BPH WITH OBSTRUCTION/LOWER URINARY TRACT SYMPTOMS: ICD-10-CM

## 2024-09-17 DIAGNOSIS — R31.21 ASYMPTOMATIC MICROSCOPIC HEMATURIA: Primary | ICD-10-CM

## 2024-09-17 DIAGNOSIS — R97.20 ELEVATED PROSTATE SPECIFIC ANTIGEN (PSA): ICD-10-CM

## 2024-09-17 DIAGNOSIS — R31.21 ASYMPTOMATIC MICROSCOPIC HEMATURIA: ICD-10-CM

## 2024-09-17 DIAGNOSIS — N40.1 BPH WITH OBSTRUCTION/LOWER URINARY TRACT SYMPTOMS: ICD-10-CM

## 2024-09-17 PROCEDURE — 250N000013 HC RX MED GY IP 250 OP 250 PS 637: Performed by: UROLOGY

## 2024-09-17 PROCEDURE — 52000 CYSTOURETHROSCOPY: CPT | Performed by: UROLOGY

## 2024-09-17 PROCEDURE — 250N000009 HC RX 250: Performed by: UROLOGY

## 2024-09-17 PROCEDURE — G0463 HOSPITAL OUTPT CLINIC VISIT: HCPCS | Mod: 25

## 2024-09-17 RX ORDER — LIDOCAINE HYDROCHLORIDE 20 MG/ML
JELLY TOPICAL ONCE
Status: COMPLETED | OUTPATIENT
Start: 2024-09-17 | End: 2024-09-17

## 2024-09-17 RX ADMIN — CEPHALEXIN 250 MG: 250 CAPSULE ORAL at 16:16

## 2024-09-17 RX ADMIN — LIDOCAINE HYDROCHLORIDE: 20 JELLY TOPICAL at 16:15

## 2024-09-17 NOTE — NURSING NOTE
Patient positioned in supine position, perineum area prepped with chlorhexidene Gluconate, betadine swabs, patient draped per sterile technique. Per verbal order read back by Que Morales MD, Urojet 10mL 2% lidocaine jelly to be instilled into urethra.      Universal Protocol    A. Pre-procedure verification complete Yes  1-relevant information / documentation available, reviewed and properly matched to the patient; 2-consent accurate and complete, 3-equipment and supplies available    B. Site marking complete Yes  Site marked if not in continuous attendance with patient    C. TIME OUT completed Yes  Time Out was conducted just prior to starting procedure to verify the eight required elements: 1-patient identity, 2-consent accurate and complete, 3-position, 4-correct side/site marked (if applicable), 5-procedure, 6-relevant images / results properly labeled and displayed (if applicable), 7-antibiotics / irrigation fluids (if applicable), 8-safety precautions.    After procedure perineum area rinsed. Discharge instructions reviewed with patient. Patient verbalized understanding of discharge instructions and discharged ambulatory.  Talisha Josue RN..................9/17/2024  3:15 PM

## 2024-09-17 NOTE — PROGRESS NOTES
78-year-old male with a history of BPH with moderate lower urinary tract symptoms, elevated PSA, abnormal MRI and microhematuria demonstrating a left renal calculus who presents today for cystoscopy.    History of elevated PSA with previous biopsy by Dr. 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    PSA is since risen to 14.4 however his percent free is 25.69 putting his risk of finding cancer on biopsy at 16% only.    Moderate lower urinary tract symptoms for which she is on tamsulosin.    [place] discussed [place]P [place] at the [facility] understanding that there is a 10% chance of finding prostate cancer.  However we discussed that more likely the PSA is elevated because of his enlarged prostate.    Here today for cystoscopy.    Male Cystoscopy Procedure Note     PRE-PROCEDURE DIAGNOSIS: Microscopic hematuria  POST-PROCEDURE DIAGNOSIS: Same  PROCEDURE: cystoscopy    RISKS DISCUSSED:  Bleeding, infection, urethral stricture, irritative voiding symptoms, retention of urine.      DESCRIPTION OF PROCEDURE:  After informed consent was obtained, the patient was brought to the procedure room where he was placed in the supine position with all pressure points well padded.  The penis and scrotum were prepped and draped in a sterile fashion. A flexible cystoscope was introduced through a well-lubricated urethra.      FINDINGS:    Meatus: normal  Urethra: Normal  Prostate: Kissing lateral lobes, high riding bladder neck, large obstructing prostate  Bladder Neck: Closed  Bladder: No visible tumor, stones or lesions, large median lobe, moderate trabeculation   Trigone:  Normal ureters, normal efflux    The flexible cystoscope was removed and the findings were described to the patient.     FINDINGS: Findings consistent with bladder outlet obstruction and large median lobe.  No visible tumor    Assessment  Microscopic hematuria with negative cystoscopy  BPH with moderate LUTS  Elevated PSA with  previously negative biopsy  High-grade PIN  Left renal calculus      Plan  Cystoscopy negative.  I would assume his blood is either related to the kidney stone and or the prostate given its large size.    PSA elevation is concerning although his had a negative biopsy.  His percent free is reassuring and his risk of finding cancer is relatively low.  He is reluctant to do a biopsy and I agree.    His prostates over 100 g and warrants surgical intervention.  He deserves a HoLEP.  He understands that this would need to be done in the [facility].  He understands there is a risk of finding cancer that may or may not need treatment.    Aftercare and recovery was discussed.  They are agreeable to consultation with [facility] for HoLEP.    All questions were addressed

## 2024-09-18 ENCOUNTER — TELEPHONE (OUTPATIENT)
Dept: UROLOGY | Facility: OTHER | Age: 79
End: 2024-09-18
Payer: COMMERCIAL

## 2024-09-18 ENCOUNTER — TRANSCRIBE ORDERS (OUTPATIENT)
Dept: OTHER | Age: 79
End: 2024-09-18

## 2024-09-18 DIAGNOSIS — N40.1 BPH WITH OBSTRUCTION/LOWER URINARY TRACT SYMPTOMS: Primary | ICD-10-CM

## 2024-09-18 DIAGNOSIS — R97.20 ELEVATED PROSTATE SPECIFIC ANTIGEN (PSA): ICD-10-CM

## 2024-09-18 DIAGNOSIS — N13.8 BPH WITH OBSTRUCTION/LOWER URINARY TRACT SYMPTOMS: Primary | ICD-10-CM

## 2024-09-18 NOTE — TELEPHONE ENCOUNTER
Talked to wife and she is aware that no further antibiotic needed. Just the one given at time of procedure. Carol Mary LPN ....................9/18/2024  10:10 AM

## 2024-09-18 NOTE — TELEPHONE ENCOUNTER
Patient called and has a question regarding an antibiotic. Please contact patient.      Sherri Lopez on 9/18/2024 at 8:39 AM

## 2024-09-25 ENCOUNTER — OFFICE VISIT (OUTPATIENT)
Dept: UROLOGY | Facility: CLINIC | Age: 79
End: 2024-09-25
Attending: UROLOGY
Payer: COMMERCIAL

## 2024-09-25 VITALS
BODY MASS INDEX: 28.88 KG/M2 | DIASTOLIC BLOOD PRESSURE: 76 MMHG | HEIGHT: 67 IN | WEIGHT: 184 LBS | SYSTOLIC BLOOD PRESSURE: 117 MMHG | TEMPERATURE: 98.4 F

## 2024-09-25 DIAGNOSIS — N40.1 BPH WITH OBSTRUCTION/LOWER URINARY TRACT SYMPTOMS: Primary | ICD-10-CM

## 2024-09-25 DIAGNOSIS — R31.21 ASYMPTOMATIC MICROSCOPIC HEMATURIA: ICD-10-CM

## 2024-09-25 DIAGNOSIS — R97.20 ELEVATED PROSTATE SPECIFIC ANTIGEN (PSA): ICD-10-CM

## 2024-09-25 DIAGNOSIS — N13.8 BPH WITH OBSTRUCTION/LOWER URINARY TRACT SYMPTOMS: Primary | ICD-10-CM

## 2024-09-25 LAB
ALBUMIN UR-MCNC: NEGATIVE MG/DL
APPEARANCE UR: CLEAR
BILIRUB UR QL STRIP: NEGATIVE
COLOR UR AUTO: YELLOW
GLUCOSE UR STRIP-MCNC: NEGATIVE MG/DL
HGB UR QL STRIP: ABNORMAL
KETONES UR STRIP-MCNC: NEGATIVE MG/DL
LEUKOCYTE ESTERASE UR QL STRIP: NEGATIVE
NITRATE UR QL: NEGATIVE
PH UR STRIP: 5 [PH] (ref 5–7)
PSA SERPL DL<=0.01 NG/ML-MCNC: 17.56 NG/ML (ref 0–6.5)
SP GR UR STRIP: 1.02 (ref 1–1.03)
UROBILINOGEN UR STRIP-MCNC: NORMAL MG/DL

## 2024-09-25 PROCEDURE — 36415 COLL VENOUS BLD VENIPUNCTURE: CPT | Performed by: UROLOGY

## 2024-09-25 PROCEDURE — 51798 US URINE CAPACITY MEASURE: CPT | Performed by: UROLOGY

## 2024-09-25 PROCEDURE — 99204 OFFICE O/P NEW MOD 45 MIN: CPT | Mod: 25 | Performed by: UROLOGY

## 2024-09-25 PROCEDURE — 81003 URINALYSIS AUTO W/O SCOPE: CPT | Performed by: UROLOGY

## 2024-09-25 PROCEDURE — 84153 ASSAY OF PSA TOTAL: CPT | Performed by: UROLOGY

## 2024-09-25 RX ORDER — FINASTERIDE 5 MG/1
5 TABLET, FILM COATED ORAL DAILY
Qty: 90 TABLET | Refills: 3 | Status: SHIPPED | OUTPATIENT
Start: 2024-09-25

## 2024-09-25 ASSESSMENT — PAIN SCALES - GENERAL: PAINLEVEL: NO PAIN (0)

## 2024-09-25 NOTE — PROGRESS NOTES
S: Chao Kearney is a pleasant  78 year old male who was requested to be seen by Dr. Morales for a consult with regard to patient's urinary complaints.  Patient complains of minimal voiding symptoms.  He is currently on flomax.  Recently he has cystoscopy for microscopic hematuria and was found to have obstructed BPH.  His prostate size was 102 gm with PIRADS 4 nodule on 3/24.  He has history of elevated PSA with biopsy done many year ago that showed high grade PIN.   He is not bothered by his LUTS.    His recent PSA was found to be   PSA Tumor Marker   Date Value Ref Range Status   03/07/2024 14.40 (H) 0.00 - 6.50 ng/mL Final   10/11/2022 7.81 (H) 0.00 - 4.00 ug/L Final   .  His AUA Symptom Score:  4.  His QOL score:  1.    Current Outpatient Medications   Medication Sig Dispense Refill    Cholecalciferol (VITAMIN D3) 1000 UNITS CAPS Take 1 capsule by mouth daily      finasteride (PROSCAR) 5 MG tablet Take 1 tablet (5 mg) by mouth daily. 90 tablet 3    hydrochlorothiazide (HYDRODIURIL) 12.5 MG tablet TAKE 1 TABLET EVERY DAY 90 tablet 4    hydrOXYzine HCl (ATARAX) 10 MG tablet Take 1 tablet (10 mg) by mouth every 6 hours as needed for itching or anxiety (with pain, moderate pain) 30 tablet 1    lisinopril (ZESTRIL) 40 MG tablet Take 1 tablet daily 90 tablet 4    meclizine (ANTIVERT) 12.5 MG tablet TAKE 2 TABLETS FOUR TIMES DAILY AS NEEDED FOR DIZZINESS 20 tablet 0    Multiple Vitamins-Minerals (MULTILEX-T&M) TABS Take 1 tablet by mouth daily      rosuvastatin (CRESTOR) 20 MG tablet TAKE 1 TABLET (20 MG) BY MOUTH AT BEDTIME 90 tablet 4    tamsulosin (FLOMAX) 0.4 MG capsule TAKE 2 CAPSULES EVERY DAY WITH FOOD 180 capsule 4    triamcinolone (KENALOG) 0.1 % external cream Apply topically to affected area(s) 3 times daily. 30 g 1    aspirin 81 MG EC tablet Take 1 tablet (81 mg) by mouth 2 times daily (Patient not taking: Reported on 9/25/2024) 60 tablet 0     Allergies   Allergen Reactions    Fluorescein-Benoxinate Other  (See Comments)     Does not recall reaction, eye reaction     Past Medical History:   Diagnosis Date    Actinic keratosis     No Comments Provided    Anemia     No Comments Provided    Diverticulosis of large intestine without perforation or abscess without bleeding     No Comments Provided    Elevated prostate specific antigen (PSA)     No Comments Provided    Essential (primary) hypertension     No Comments Provided    Generalized anxiety disorder     No Comments Provided    History of colonic polyps     No Comments Provided    Hydrocephalus (H)     (obstructive)    Hyperlipidemia     No Comments Provided    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 Marin 1/24/05    Nontraumatic subarachnoid hemorrhage (H)     No Comments Provided    Osteoarthritis of hip     No Comments Provided    Other seborrheic keratosis     No Comments Provided    Other specified disorders of bone, unspecified site (CODE)     1/05,Lingual sean, bony growth, lower mandible, evaluation Dr. Marin 1/05    Pain in right hip     No Comments Provided    Personal history of other medical treatment (CODE)     05/16/05,Q wave in 3 and AVF leads on electrocardiogram done    Plantar fascial fibromatosis     No Comments Provided    Retention of urine     No Comments Provided    Screening for other and unspecified cardiovascular conditions     05/20/2005,Stress echocardiogram is negative for ischemia with ejection fraction of 85%    Stiffness of unspecified joint, not elsewhere classified     No Comments Provided     Past Surgical History:   Procedure Laterality Date    ARTHROPLASTY HIP ANTERIOR Right 5/17/2022    Procedure: ARTHROPLASTY, HIP, TOTAL, DIRECT ANTERIOR APPROACH;  Surgeon: Luis Miguel Lama MD;  Location: [facility] OR    COLONOSCOPY  04/26/2010    (04/26/2010),F/U 2015    COLONOSCOPY  10/15/2015    10/15/15,F/U 2025     EXTRACAPSULAR CATARACT EXTRATION WITH INTRAOCULAR LENS IMPLANT          OTHER SURGICAL HISTORY      3/26/14,39569.0,VT TOTAL HIP ARTHROPLASTY,Left,direct anterior approach, Dr. Lama    OTHER SURGICAL HISTORY      ,[id number],CRANIOTOMY    OTHER SURGICAL HISTORY      2016,,HERNIA REPAIR,Left,LIH with mesh      Family History   Problem Relation Age of Onset    Cancer Mother 78        Cancer, lung cancer at age 78, developed brain metastases    Heart Disease Father 50        Heart Disease,heart failure    Other - See Comments Father          MS, long-standing    Cancer Paternal Grandfather         Cancer, of cancer of unknown type    Other - See Comments Sister          MS     He does not have a family history of prostate cancer.  Social History     Socioeconomic History    Marital status:      Spouse name: None    Number of children: None    Years of education: None    Highest education level: None   Tobacco Use    Smoking status: Former     Current packs/day: 0.00     Types: Cigarettes     Quit date: 1970     Years since quittin.7     Passive exposure: Past    Smokeless tobacco: Never    Tobacco comments:     smoked off and on for 2 years   Vaping Use    Vaping status: Never Used   Substance and Sexual Activity    Alcohol use: Yes     Alcohol/week: 2.0 standard drinks of alcohol     Types: 2 Cans of beer per week     Comment: 2 times a week/2 drinks    Drug use: No    Sexual activity: Not Currently     Partners: Female   Social History Narrative    Lived in [place] for over 30 years.  He taught grade 4 and 6.    .  His wife grew up here and they built on family land here after he retired.      2 kids.    Andrez in [place].  Chana lives in [place].    Very active with walks, and daily stretches.     Social Determinants of Health     Financial Resource Strain: Low Risk  (2024)    Financial Resource Strain     Within the past 12 months, have you or your family  "members you live with been unable to get utilities (heat, electricity) when it was really needed?: No   Food Insecurity: Low Risk  (1/26/2024)    Food Insecurity     Within the past 12 months, did you worry that your food would run out before you got money to buy more?: No     Within the past 12 months, did the food you bought just not last and you didn t have money to get more?: No   Transportation Needs: Low Risk  (1/26/2024)    Transportation Needs     Within the past 12 months, has lack of transportation kept you from medical appointments, getting your medicines, non-medical meetings or appointments, work, or from getting things that you need?: No   Interpersonal Safety: Low Risk  (1/26/2024)    Interpersonal Safety     Do you feel physically and emotionally safe where you currently live?: Yes   Housing Stability: Low Risk  (1/26/2024)    Housing Stability     Do you have housing? : Yes     Are you worried about losing your housing?: No        REVIEW OF SYSTEMS  =================  C: NEGATIVE for fever, chills, change in weight  I: NEGATIVE for worrisome rashes, moles or lesions  E/M: NEGATIVE for ear, mouth and throat problems  R: NEGATIVE for significant cough or SHORTNESS OF BREATH  CV:  NEGATIVE for chest pain, palpitations or peripheral edema  GI: NEGATIVE for nausea, abdominal pain, heartburn, or change in bowel habits  NEURO: NEGATIVE numbness/weakness  : see HPI  PSYCH: NEGATIVE depression/anxiety  LYmph: no new enlarged lymph nodes  Ortho: no new trauma/movements           O: Exam:/76 (BP Location: Left arm, Patient Position: Sitting, Cuff Size: Adult Regular)   Temp 98.4  F (36.9  C) (Temporal)   Ht 1.702 m (5' 7\")   Wt 83.5 kg (184 lb)   BMI 28.82 kg/m     Constitutional: healthy, alert and no distress  Cardiovascular: negative, PMI normal.   Respiratory: negative, no evidence of respiratory distress  Gastrointestinal: Abdomen soft, non-tender. BS normal. No masses, organomegaly  : " normal male.  PVR < 50 ml  Musculoskeletal: extremities normal- no gross deformities noted, gait normal and normal muscle tone  Skin: no suspicious lesions or rashes  Neurologic: Alert and oriented  Psychiatric: mentation appears normal. and affect normal/bright  Hematologic/Lymphatic/Immunologic: normal ant/post cervical, axillary, supraclavicular and inguinal nodes                                                                     IMPRESSION:  1. Based on the most suspicious abnormality, this exam is  characterized as PIRADS 4 - High probability.  Clinically significant  cancer is likely to be present.  The most suspicious abnormality is  located in the transitional zone at the left base.  2. No evidence of extraprostatic malignancy. No suspicious adenopathy  or evidence of pelvic metastases    Assessment/Plan:   (N40.1,  N13.8) BPH with obstruction/lower urinary tract symptoms  (primary encounter diagnosis)  Comment:  Medical management versus surgical management versus office treatments, were discussed with patient at length.    Plan: cont with flomax           Add proscar    (R97.20) Elevated prostate specific antigen (PSA)  Comment:  PIRADS 4 on prostate MRI but with high free psa / advance age.  Plan: recheck psa today.  Briefly discussed pros and cons of psa screening for prostate cancer.

## 2024-09-25 NOTE — PROGRESS NOTES
Bladder Scan performed. 29 ml maximum residual urine detected after 3 scans. MD informed   Karen Harvey MA on 9/25/2024 at 9:10 AM

## 2024-09-26 ENCOUNTER — MYC MEDICAL ADVICE (OUTPATIENT)
Dept: UROLOGY | Facility: CLINIC | Age: 79
End: 2024-09-26
Payer: COMMERCIAL

## 2024-09-27 ENCOUNTER — IMMUNIZATION (OUTPATIENT)
Dept: FAMILY MEDICINE | Facility: OTHER | Age: 79
End: 2024-09-27
Payer: MEDICARE

## 2024-09-27 VITALS — TEMPERATURE: 98.2 F

## 2024-09-27 DIAGNOSIS — Z23 ENCOUNTER FOR IMMUNIZATION: Primary | ICD-10-CM

## 2024-09-27 PROCEDURE — 90480 ADMN SARSCOV2 VAC 1/ONLY CMP: CPT

## 2024-09-27 PROCEDURE — 90662 IIV NO PRSV INCREASED AG IM: CPT

## 2024-09-27 NOTE — PROGRESS NOTES
Prior to immunization administration, verified patients identity using patient s name and date of birth. Please see Immunization Activity for additional information.     Is the patient's temperature normal (100.5 or less)? Yes     Patient MEETS CRITERIA. PROCEED with vaccine administration.          9/27/2024   COVID   Have you had myocarditis or pericarditis (inflammation of or around the heart muscle) after getting a COVID-19 vaccine? No   Have you had a serious reaction to a COVID vaccine or something in a COVID vaccine, like polyethylene glycol (PEG) or polysorbate? No   Have you had multisystem inflammatory syndrome from COVID-19 in the past 90 days? No            Patient MEETS CRITERIA. PROCEED with vaccine administration.        9/27/2024   INFLUENZA   Would you like to receive the flu shot or the nasal flu vaccine today? Flu Shot   Have you had a serious reaction to a flu vaccine or something in a flu vaccine? No   Have you had Guillain-Lester syndrome within 6 weeks of getting a vaccine? No   Have you received a bone marrow transplant within the previous 6 months? No            Patient MEETS CRITERIA. PROCEED with vaccine administration.        Patient instructed to remain in clinic for 15 minutes afterwards, and to report any adverse reactions.      Link to Ancillary Visit Immunization Standing Orders SmartSet     Screening performed by Ketan Cartwright RN on 9/27/2024 at 10:43 AM.

## 2024-10-02 ENCOUNTER — VIRTUAL VISIT (OUTPATIENT)
Dept: UROLOGY | Facility: CLINIC | Age: 79
End: 2024-10-02
Payer: COMMERCIAL

## 2024-10-02 DIAGNOSIS — N40.1 BPH WITH OBSTRUCTION/LOWER URINARY TRACT SYMPTOMS: ICD-10-CM

## 2024-10-02 DIAGNOSIS — N13.8 BPH WITH OBSTRUCTION/LOWER URINARY TRACT SYMPTOMS: ICD-10-CM

## 2024-10-02 DIAGNOSIS — R97.20 ELEVATED PROSTATE SPECIFIC ANTIGEN (PSA): Primary | ICD-10-CM

## 2024-10-02 PROCEDURE — 99213 OFFICE O/P EST LOW 20 MIN: CPT | Mod: 95 | Performed by: UROLOGY

## 2024-10-02 RX ORDER — LEVOFLOXACIN 500 MG/1
500 TABLET, FILM COATED ORAL DAILY
Qty: 3 TABLET | Refills: 0 | Status: SHIPPED | OUTPATIENT
Start: 2024-10-02 | End: 2024-10-05

## 2024-10-02 NOTE — PROGRESS NOTES
"Chao is a 78 year old who is being evaluated via a billable video visit.    How would you like to obtain your AVS? MyChart  If the video visit is dropped, the invitation should be resent by: Text to cell phone: [phone]  Will anyone else be joining your video visit? No          Subjective   Chao is a 78 year old, presenting for the following health issues:  Follow Up    Video Start Time:     HPI     Patient is a pleasant 78-year-old male with elevated PSA and BPH.  Recently his PSA has gone up to 17.5.  This is double for what it was 2 years ago.  Patient had a prostate MRI recently that showed PI-RADS 4 nodule.  He is on Flomax and finasteride for lower urinary tract symptoms.      Review of Systems  Constitutional, HEENT, cardiovascular, pulmonary, gi and gu systems are negative, except as otherwise noted.      Objective    Vitals - Patient Reported  Weight (Patient Reported): 83.5 kg (184 lb)  Height (Patient Reported): 170.2 cm (5' 7.01\")  BMI (Based on Pt Reported Ht/Wt): 28.81  Pain Score: No Pain (0)        Physical Exam   GENERAL: alert and no distress  EYES: Eyes grossly normal to inspection.  No discharge or erythema, or obvious scleral/conjunctival abnormalities.  RESP: No audible wheeze, cough, or visible cyanosis.    SKIN: Visible skin clear. No significant rash, abnormal pigmentation or lesions.  NEURO: Cranial nerves grossly intact.  Mentation and speech appropriate for age.  PSYCH: Appropriate affect, tone, and pace of words    Office Visit on 09/25/2024   Component Date Value Ref Range Status    PSA Tumor Marker 09/25/2024 17.56 (H)  0.00 - 6.50 ng/mL Final    Color Urine 09/25/2024 Yellow  Colorless, Straw, Light Yellow, Yellow Final    Appearance Urine 09/25/2024 Clear  Clear Final    Glucose Urine 09/25/2024 Negative  Negative mg/dL Final    Bilirubin Urine 09/25/2024 Negative  Negative Final    Ketones Urine 09/25/2024 Negative  Negative mg/dL Final    Specific Gravity Urine 09/25/2024 " 1.018  1.003 - 1.035 Final    Blood Urine 09/25/2024 Small (A)  Negative Final    pH Urine 09/25/2024 5.0  5.0 - 7.0 Final    Protein Albumin Urine 09/25/2024 Negative  Negative mg/dL Final    Urobilinogen Urine 09/25/2024 Normal  Normal, 2.0 mg/dL Final    Nitrite Urine 09/25/2024 Negative  Negative Final    Leukocyte Esterase Urine 09/25/2024 Negative  Negative Final         #1 elevated PSA: Results discussed.  Discussed prostate biopsy.  Risks of infection and bleeding discussed.  Scheduled for MRI fusion prostate biopsy next.  #2 BPH: Answer questions about his medications.  Continue with current meds.  Video-Visit Details    Type of service:  Video Visit   Video End Time:  Originating Location (pt. Location): Home    Distant Location (provider location):  On-site  Platform used for Video Visit: Shante  Signed Electronically by: Cullen Berg MD

## 2024-10-09 ENCOUNTER — TELEPHONE (OUTPATIENT)
Dept: UROLOGY | Facility: CLINIC | Age: 79
End: 2024-10-09
Payer: COMMERCIAL

## 2024-10-09 DIAGNOSIS — R97.20 ELEVATED PROSTATE SPECIFIC ANTIGEN (PSA): Primary | ICD-10-CM

## 2024-10-09 NOTE — CONFIDENTIAL NOTE
Writer called and spoke with patient about Fusion biopsy procedure scheduling.    Last Prostate MRI date: 3/19/24  PI-RADS score: 4  Lesion #1: Yes PI-RADS: 2  Lesion #2: Yes PI-RADS: 4    Most recent PSA:   PSA Tumor Marker   Date Value Ref Range Status   09/25/2024 17.56 (H) 0.00 - 6.50 ng/mL Final   10/11/2022 7.81 (H) 0.00 - 4.00 ug/L Final     Patient currently on blood thinners? Yes -- ASA 81mg, stop 7 days prior.     Education documentation:  To improve patient experience and health outcomes, patient Received written materials handout and general discussion/verbal explanation on Fusion biopsy. Patient acknowledged understanding on the education.     Appointment scheduling:  The following appointments were scheduled:   Fusion biopsy appointment with Dr. Berg, FK Ultrasound  1 week follow up for OV or VV with Dr. Berg for results.    Prescribing medication (sent to patient's preferred pharmacy):  Antibiotics   No prescriptions requested or ordered in this encounter    Pt was also informed to purchase a Fleets  enema OTC.    Information was sent to patient via [employer].    Mayda FERRER RN   [facility], Urology   10/9/2024 1:29 PM

## 2024-10-09 NOTE — TELEPHONE ENCOUNTER
----- Message from Tonja FERRER sent at 9/25/2024  2:50 PM CDT -----  Regarding: FUSION BX  FUSION BX SCHEDULING

## 2024-10-18 ENCOUNTER — HOSPITAL ENCOUNTER (OUTPATIENT)
Dept: GENERAL RADIOLOGY | Facility: CLINIC | Age: 79
Discharge: HOME | End: 2024-10-18
Attending: UROLOGY
Payer: COMMERCIAL

## 2024-10-18 DIAGNOSIS — R97.20 ELEVATED PROSTATE SPECIFIC ANTIGEN (PSA): ICD-10-CM

## 2024-10-18 DIAGNOSIS — R93.5 ABNORMAL MRI, PELVIS: Primary | ICD-10-CM

## 2024-10-18 PROCEDURE — 999N000122 MR MHEALTH OVERREAD

## 2024-10-18 PROCEDURE — 72197 MRI PELVIS W/O & W/DYE: CPT | Mod: 26 | Performed by: RADIOLOGY

## 2024-10-24 ENCOUNTER — ANCILLARY PROCEDURE (OUTPATIENT)
Dept: ULTRASOUND IMAGING | Facility: CLINIC | Age: 79
End: 2024-10-24
Payer: COMMERCIAL

## 2024-10-24 ENCOUNTER — OFFICE VISIT (OUTPATIENT)
Dept: UROLOGY | Facility: CLINIC | Age: 79
End: 2024-10-24
Payer: COMMERCIAL

## 2024-10-24 VITALS
HEART RATE: 58 BPM | OXYGEN SATURATION: 98 % | SYSTOLIC BLOOD PRESSURE: 159 MMHG | TEMPERATURE: 98 F | DIASTOLIC BLOOD PRESSURE: 85 MMHG

## 2024-10-24 DIAGNOSIS — R97.20 ELEVATED PROSTATE SPECIFIC ANTIGEN (PSA): ICD-10-CM

## 2024-10-24 DIAGNOSIS — R97.20 ELEVATED PROSTATE SPECIFIC ANTIGEN (PSA): Primary | ICD-10-CM

## 2024-10-24 DIAGNOSIS — I10 ESSENTIAL HYPERTENSION, BENIGN: Chronic | ICD-10-CM

## 2024-10-24 PROCEDURE — 96372 THER/PROPH/DIAG INJ SC/IM: CPT | Mod: 59 | Performed by: UROLOGY

## 2024-10-24 PROCEDURE — G0416 PROSTATE BIOPSY, ANY MTHD: HCPCS | Performed by: PATHOLOGY

## 2024-10-24 PROCEDURE — 55700 PR BIOPSY OF PROSTATE,NEEDLE/PUNCH: CPT | Performed by: UROLOGY

## 2024-10-24 PROCEDURE — 76942 ECHO GUIDE FOR BIOPSY: CPT | Performed by: UROLOGY

## 2024-10-24 RX ORDER — GENTAMICIN 40 MG/ML
80 INJECTION, SOLUTION INTRAMUSCULAR; INTRAVENOUS ONCE
Status: COMPLETED | OUTPATIENT
Start: 2024-10-24 | End: 2024-10-24

## 2024-10-24 RX ADMIN — GENTAMICIN 80 MG: 40 INJECTION, SOLUTION INTRAMUSCULAR; INTRAVENOUS at 11:03

## 2024-10-24 NOTE — PATIENT INSTRUCTIONS
[facility]- Urology  Post Transrectal Ultrasound Information    The physician who performed your Transrectal Ultrasound is Dr. Berg. Please contact this provider at [phone] if you have any problems or questions.     Take antibiotics as instructed.  If you were previously on anticoagulation medication, you may resume this when your bleeding stops, or as instructed by the ordering provider.  Increase fluid intake for the first 48 hours.  Limit strenuous activity for 48 hours (including intercourse)  Pt may have mild discomfort at the base of his penis or in his rectum. Ok to take tylenol as needed, not to exceed more than 4,000mg in 24 hours. You may take a tub soak to alleviate groin discomfort.   Pt may have blood in your urine, semen or stool intermittently for the up to 12 weeks.    When to seek care in the Emergency room (includes but is not limited to):  If you experience a fever over 101.0, chills, nausea, or just not feeling well up to 72 hours after the biopsy you need call the clinic or if after hours, go to the emergency room.    Some men are unable to pass urine after the biopsy. This is called urine retention. This is very rare.  If you are unable to urinate please call our clinic, or if after hours, go to the emergency room.  Severe discomfort or pain not well managed with Tyelnol and/or ibuprofen

## 2024-10-24 NOTE — PROGRESS NOTES
The patient arrives to the clinic for MRI guided Fusion prostate biopsy.   Consent form was signed? Yes  Antibiotic taken?  10.23.24 The patient confirmed that he has taken his pre-procedure antibiotic starting yesterday.  Aspirin or other blood thinning medications discontinued 7-10 days:  Yes  Time of enema:  10.24.24    Clinic Administered Medication Documentation    Administrations This Visit       gentamicin (GARAMYCIN) injection 80 mg       Admin Date  10/24/2024 Action  $Given Dose  80 mg Route  Intramuscular Site  Right Gluteus James Documented By  Tonja Panchal RN    Ordering Provider: Cullen Berg MD    NDC: 26581-486-34    : Drizly    Patient Supplied?: No    Comments: LOT [id number]  EXP 05/25                      Patient was given GENTAMICIN. Prior to medication administration, verified patient's identity using patient s name and date of birth. Please see MAR and medication order for additional information. Patient instructed to remain in clinic for 15 minutes and report any adverse reaction to staff immediately.    Vial/Syringe: Single dose vial. Was entire vial of medication used? Yes      Education documentation:  To improve patient experience and health outcomes, patient was educated on post prostate biopsy.  Take antibiotics as instructed.  Increase fluid intake for the first 48 hours.  Limit strenuous activity for 48 hours (including intercourse)  Pt may have mild discomfort at the base of his penis or in his rectum. Ok to take tylenol as needed, not to exceed more than 4,000mg in 24 hours.  Pt may have blood in his urine, semen or stool intermittently for the next 6-8 weeks.    When to seek care in the Emergency room:  If you experience a fever, chills, nausea, or just not feeling well up to 72 hours after the biopsy you need to go directly to the emergency room.    Some men are unable to pass urine after the biopsy. This is called urine retention. This is very  rare.  If you are unable to urinate please call our clinic or go to urgent care or the emergency room.    Patient Received written materials handout and general discussion/verbal explanation. Patient acknowledged understanding on the education.     Writer stressed the importance of the above.  Reviewed signs of infection and post procedure complications.  Provided contact numbers for questions or concerns.  Patient verbalized understanding of follow-up and denied any questions or concerns.  Follow-up appointment made with Dr. Berg for results review.    SINDHU COON was present and available throughout the duration of the case and available to assist.    Mayda FERRER RN Urology 10/24/2024 10:40 AM

## 2024-10-24 NOTE — PROGRESS NOTES
Patient is here for prostate biopsy.  He was placed in the dorsal lithotomy position.  Prostate ultrasound placed transrectally.  The neurovascular bundle was anesthetized using 1% lidocaine.  Prostate measurements obtained.  Prostate size is 100 gm.  3 biopsies obtained from nodule.  12 biopsies obtained under guidance of prostate ultrasound using biopsy needle.  Patient tolerated procedure.  Return to clinic in one week for biopsy result.    Elevated bp: Chao to follow up with Primary Care provider regarding elevated blood pressure.

## 2024-10-25 ENCOUNTER — NURSE TRIAGE (OUTPATIENT)
Dept: FAMILY MEDICINE | Facility: OTHER | Age: 79
End: 2024-10-25
Payer: COMMERCIAL

## 2024-10-25 ENCOUNTER — OFFICE VISIT (OUTPATIENT)
Dept: FAMILY MEDICINE | Facility: OTHER | Age: 79
End: 2024-10-25
Attending: NURSE PRACTITIONER
Payer: COMMERCIAL

## 2024-10-25 VITALS
HEART RATE: 62 BPM | WEIGHT: 185.4 LBS | BODY MASS INDEX: 29.1 KG/M2 | DIASTOLIC BLOOD PRESSURE: 74 MMHG | OXYGEN SATURATION: 97 % | TEMPERATURE: 97.5 F | SYSTOLIC BLOOD PRESSURE: 126 MMHG | HEIGHT: 67 IN | RESPIRATION RATE: 16 BRPM

## 2024-10-25 DIAGNOSIS — S30.861A TICK BITE OF WAISTBAND REGION, INITIAL ENCOUNTER: Primary | ICD-10-CM
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
## 2024-10-25 LAB
PATH REPORT.COMMENTS IMP SPEC: NORMAL
PATH REPORT.COMMENTS IMP SPEC: NORMAL
PATH REPORT.FINAL DX SPEC: NORMAL
PATH REPORT.GROSS SPEC: NORMAL
PATH REPORT.MICROSCOPIC SPEC OTHER STN: NORMAL
PATH REPORT.RELEVANT HX SPEC: NORMAL
PHOTO IMAGE: NORMAL

## 2024-10-25 PROCEDURE — 99213 OFFICE O/P EST LOW 20 MIN: CPT | Performed by: NURSE PRACTITIONER

## 2024-10-25 PROCEDURE — G0463 HOSPITAL OUTPT CLINIC VISIT: HCPCS

## 2024-10-25 ASSESSMENT — PAIN SCALES - GENERAL: PAINLEVEL_OUTOF10: NO PAIN (0)

## 2024-10-25 NOTE — PROGRESS NOTES
ASSESSMENT/PLAN:     I have reviewed the nursing notes.  I have reviewed the findings, diagnosis, plan and need for follow up with the patient.          1. Tick bite of waistband region, initial encounter (Primary)  Doxycycline 200 mg x 1 dose (patient took own medication from prior Rx)    No lab work needed today as it is too soon for accurate results   Instructed to wash tick bite with soap and water. Apply antibiotic ointment to site 1-2 times daily until healed.  Tick education discussed. Tick tornado remover provided.    Watch for flu like illness such as fevers and chills; arthritis type pain such as joint pains and stiffness.   Follow up if development of any of these symptoms for further evaluation.          I explained my diagnostic considerations and recommendations to the patient, who voiced understanding and agreement with the treatment plan. All questions were answered. We discussed potential side effects of any prescribed or recommended therapies, as well as expectations for response to treatments.    Hattie Pittman NP  [facility]      SUBJECTIVE:   Chao Kearney is a 78 year old male who presents to clinic today for the following health issues:  Embedded tick    HPI  Patient accompanied today by his spouse.  Information obtained by patient.  Patient noted an embedded deer tick on his right lateral waistband and attempted to remove.  He is concerned the tick head is still embedded and would like this removed.  Patient has a prescription of doxycycline on hand for prophylactic treatment and did take a dose of 200 mg this morning.  He denies any current symptoms related to tick illness such as fever, chills, new or worsening body aches, joint aches, fatigue, or headaches.      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 Marin 1/24/05    Nontraumatic subarachnoid hemorrhage (H)     No Comments Provided    Osteoarthritis of hip     No Comments Provided    Other seborrheic keratosis     No Comments Provided    Other specified disorders of bone, unspecified site (CODE)     1/05,Lingual sean, bony growth, lower mandible, evaluation Dr. Marin 1/05    Pain in right hip     No Comments Provided    Personal history of other medical treatment (CODE)     05/16/05,Q wave in 3 and AVF leads on electrocardiogram done    Plantar fascial fibromatosis     No Comments Provided    Retention of urine     No Comments Provided    Screening for other and unspecified cardiovascular conditions     05/20/2005,Stress echocardiogram is negative for ischemia with ejection fraction of 85%    Stiffness of unspecified joint, not elsewhere classified     No Comments Provided     Past Surgical History:   Procedure Laterality Date    ARTHROPLASTY HIP ANTERIOR Right 5/17/2022    Procedure: ARTHROPLASTY, HIP, TOTAL, DIRECT ANTERIOR APPROACH;  Surgeon: Luis Miguel Lama MD;  Location:  OR    COLONOSCOPY  04/26/2010    (04/26/2010),F/U 2015    COLONOSCOPY  10/15/2015    10/15/15,F/U 2025    EXTRACAPSULAR CATARACT EXTRATION WITH INTRAOCULAR LENS IMPLANT      2016    OTHER SURGICAL HISTORY      3/26/14,86450.0,[place] TOTAL HIP ARTHROPLASTY,Left,direct anterior approach, Dr. Lama    OTHER SURGICAL HISTORY      2014,[id number],CRANIOTOMY    OTHER SURGICAL HISTORY       2016,,HERNIA REPAIR,Left,LIH with mesh     Social History     Tobacco Use    Smoking status: Former     Current packs/day: 0.00     Types: Cigarettes     Quit date: 1970     Years since quittin.8     Passive exposure: Past    Smokeless tobacco: Never    Tobacco comments:     smoked off and on for 2 years   Substance Use Topics    Alcohol use: Yes     Alcohol/week: 2.0 standard drinks of alcohol     Types: 2 Cans of beer per week     Comment: 2 times a week/2 drinks     Current Outpatient Medications   Medication Sig Dispense Refill    aspirin 81 MG EC tablet Take 1 tablet (81 mg) by mouth 2 times daily 60 tablet 0    Cholecalciferol (VITAMIN D3) 1000 UNITS CAPS Take 1 capsule by mouth daily      finasteride (PROSCAR) 5 MG tablet Take 1 tablet (5 mg) by mouth daily. 90 tablet 3    hydrochlorothiazide (HYDRODIURIL) 12.5 MG tablet TAKE 1 TABLET EVERY DAY 90 tablet 4    hydrOXYzine HCl (ATARAX) 10 MG tablet Take 1 tablet (10 mg) by mouth every 6 hours as needed for itching or anxiety (with pain, moderate pain) 30 tablet 1    lisinopril (ZESTRIL) 40 MG tablet Take 1 tablet daily 90 tablet 4    meclizine (ANTIVERT) 12.5 MG tablet TAKE 2 TABLETS FOUR TIMES DAILY AS NEEDED FOR DIZZINESS 20 tablet 0    Multiple Vitamins-Minerals (MULTILEX-T&M) TABS Take 1 tablet by mouth daily      rosuvastatin (CRESTOR) 20 MG tablet TAKE 1 TABLET (20 MG) BY MOUTH AT BEDTIME 90 tablet 4    tamsulosin (FLOMAX) 0.4 MG capsule TAKE 2 CAPSULES EVERY DAY WITH FOOD 180 capsule 4    triamcinolone (KENALOG) 0.1 % external cream Apply topically to affected area(s) 3 times daily. 30 g 1     Allergies   Allergen Reactions    Fluorescein-Benoxinate Other (See Comments)     Does not recall reaction, eye reaction         Past medical history, past surgical history, current medications and allergies reviewed and accurate to the best of my knowledge.        OBJECTIVE:     /74 (BP Location: Right arm, Patient Position: Sitting,  "Cuff Size: Adult Regular)   Pulse 62   Temp 97.5  F (36.4  C) (Tympanic)   Resp 16   Ht 1.702 m (5' 7\")   Wt 84.1 kg (185 lb 6.4 oz)   SpO2 97%   BMI 29.04 kg/m    Body mass index is 29.04 kg/m .        Physical Exam  General Appearance: Well appearing young elderly male, appropriate appearance for age. No acute distress  Respiratory: normal chest wall and respirations.  Normal effort.  No cough appreciated.  Musculoskeletal:  Equal movement of bilateral upper extremities.  Equal movement of bilateral lower extremities.  Normal gait.    Dermatological: Right lateral waistband with single small erythematous area with embedded tick head in center of lesion.  Area cleansed with alcohol wipe and a large bore filter needle used to easily scrape embedded tick head out of skin.  No surrounding erythema or bull's-eye lesion present.  Psychological: normal affect, alert, oriented, and pleasant.         "

## 2024-10-25 NOTE — NURSING NOTE
"Chief Complaint   Patient presents with    Insect Bites     Deer tick embedded in right hip.       Initial /74 (BP Location: Right arm, Patient Position: Sitting, Cuff Size: Adult Regular)   Pulse 62   Temp 97.5  F (36.4  C) (Tympanic)   Resp 16   Ht 1.702 m (5' 7\")   Wt 84.1 kg (185 lb 6.4 oz)   SpO2 97%   BMI 29.04 kg/m   Estimated body mass index is 29.04 kg/m  as calculated from the following:    Height as of this encounter: 1.702 m (5' 7\").    Weight as of this encounter: 84.1 kg (185 lb 6.4 oz).  Medication Review: complete    The next two questions are to help us understand your food security.  If you are feeling you need any assistance in this area, we have resources available to support you today.          1/26/2024   SDOH- Food Insecurity   Within the past 12 months, did you worry that your food would run out before you got money to buy more? N    Within the past 12 months, did the food you bought just not last and you didn t have money to get more? N        Patient-reported         Health Care Directive:  Patient has a Health Care Directive on file      Carolynn Jc      "

## 2024-10-25 NOTE — TELEPHONE ENCOUNTER
Patients spouse, Juana, called on behalf of the patient and stated they found a tick embedded in the patient. Juana stated the patient has issues with his short term memory and will be helping the patient.      Eliza Holley on 10/25/2024 at 11:26 AM

## 2024-10-25 NOTE — TELEPHONE ENCOUNTER
"Spoke with patient and patients wife.  They state they removed a tick today from patients right hip area.   States they are not sure how long tick was on for.  States they are not sure it was a deer tick but it was small  States that area has a bulls eye rash.  Wife states patient was on levaquin for prostate biopsy took last dose today.  Wife states that patient did have an Rx for doxycyline from last year and patient did take that but not sure when.  Informed patient and patients wife for patient to be seen today.   Informed of rapid clinic and hours if no clinic appointment available.  They verbalized understanding.  Patricia Baptiste RN on 10/25/2024 at 12:08 PM      Reason for Disposition   Red ring or bull's-eye rash occurs around a deer tick bite    Additional Information   Negative: Not a tick bite   Negative: Patient sounds very sick or weak to the triager   Negative: Fever or severe headache occurs, 2 to 14 days following the bite   Negative: Widespread rash occurs, 2 to 14 days following the bite   Negative: Can't remove live tick (after using Care Advice)   Negative: Fever and spreading red area or streak   Negative: Fever and area is very tender to touch   Negative: Red streak or red line and length > 2 inches (5 cm)   Negative: Red or very tender (to touch) area and started over 24 hours after the bite    Answer Assessment - Initial Assessment Questions  1. ATTACHED:  \"Is the tick still on the skin?\"  (e.g., yes, no, unsure)      Believes its all gone  2. ONSET - TICK STILL ATTACHED:  \"How long do you think the tick has been on your skin?\" (e.g., hours, days, unsure)  Note:  Is there a recent activity (camping, hiking) where the caller may have been exposed?      Unsure possible 2-3 days  3. ONSET - TICK NOT STILL ATTACHED: \"If the tick has been removed, how long do you think the tick was attached before you removed it?\" (e.g., 5 hours, 2 days). \"When was this?\"      unsure  4. LOCATION: \"Where is the " "tick bite located?\" (e.g., arm, leg)      Right hip area  5. TYPE of TICK: \"Is it a wood tick or a deer tick?\" (e.g., deer tick, wood tick; unsure)      Unsure of type  6. SIZE of TICK: \"How big is the tick?\" (e.g., size of poppy seed, apple seed, watermelon seed; unsure) Note: Deer ticks can be the size of a poppy seed (nymph) or an apple seed (adult).        Maybe apple seed size  7. ENGORGED: \"Did the tick look flat or engorged (full, swollen)?\" (e.g., flat, engorged; unsure)      engorged  8. OTHER SYMPTOMS: \"Do you have any other symptoms?\" (e.g., fever, rash, redness at bite area, red ring around bite)      Redness area , bulls eye.  9. PREGNANCY: \"Is there any chance you are pregnant?\" \"When was your last menstrual period?\"      N/a    Protocols used: Tick Bite-A-OH    "

## 2024-10-31 ENCOUNTER — VIRTUAL VISIT (OUTPATIENT)
Dept: UROLOGY | Facility: CLINIC | Age: 79
End: 2024-10-31
Payer: COMMERCIAL

## 2024-10-31 DIAGNOSIS — C61 PROSTATE CANCER (H): Primary | ICD-10-CM

## 2024-10-31 PROCEDURE — 99214 OFFICE O/P EST MOD 30 MIN: CPT | Mod: 95 | Performed by: UROLOGY

## 2024-10-31 NOTE — PROGRESS NOTES
Chao is a 79 year old who is being evaluated via a billable video visit.    How would you like to obtain your AVS? MyChart  If the video visit is dropped, the invitation should be resent by: Text to cell phone: [phone]  Will anyone else be joining your video visit? No          Subjective   Chao is a 79 year old, presenting for the following health issues:  Follow Up    Video Start Time:     HPI     Patient is a pleasant 79-year-old male status post prostate biopsy for elevated PSA.  He is without any complaints.  He still has intermittent hematuria however.      Review of Systems  Constitutional, neuro, ENT, endocrine, pulmonary, cardiac, gastrointestinal, genitourinary, musculoskeletal, integument and psychiatric systems are negative, except as otherwise noted.      Objective           Vitals:  No vitals were obtained today due to virtual visit.    Physical Exam   GENERAL: alert and no distress  EYES: Eyes grossly normal to inspection.  No discharge or erythema, or obvious scleral/conjunctival abnormalities.  RESP: No audible wheeze, cough, or visible cyanosis.    SKIN: Visible skin clear. No significant rash, abnormal pigmentation or lesions.  NEURO: Cranial nerves grossly intact.  Mentation and speech appropriate for age.  PSYCH: Appropriate affect, tone, and pace of words    Case Report   Surgical Pathology Report                         Case: [id number]                                   Authorizing Provider:  Cullen Berg MD     Collected:           10/24/2024 10:33 AM           Ordering Location:     [facility]   Received:            10/24/2024 10:59 AM                                  [place]                                                                       Pathologist:           Chao Russell MD                                                                           Specimens:   A) -  "Prostate, Left                                                                                  B) - Prostate, Right                                                                                C) - Prostate, LESION #2                                                                  Final Diagnosis   A.  Prostate, block, biopsy-  Adenocarcinoma, [place]'s grade 4/3 involving 2 of 8 needle core fragments and 10% of submitted tissue     B.  Prostate, right, biopsy-  Benign prostate tissue with atrophic change     C.  Prostate, lesion, not otherwise specified, biopsy-  Adenocarcinoma, [place]'s grade 3/4 i involving 2 of 2 needle core biopsies and 5-10% of submitted tissue, perineural invasion identified   Electronically signed by Chao Russell MD on 10/25/2024 at  9:58 AM   Clinical Information    ELEVATED PSA   Gross Description    A(A). Prostate, Left, :  The specimen is received in formalin, labeled with the patient's name, medical record number and other identifying information designated \"left prostate\". It consists of 8, 0.4-1.8 cm pale-tan, delicate needle core biopsies which are wrapped and submitted entirely in 2 cassettes.           B(B). Prostate, Right, :  The specimen is received in formalin, labeled with the patient's name, medical record number and other identifying information designated \"right prostate\". It consists of 8, 0.3-1.8 cm pale-tan, delicate needle core biopsies which are wrapped and submitted entirely in 2 cassettes.     C(C). Prostate, LESION #2:  The specimen is received in formalin, labeled with the patient's name, medical record number and other identifying information designated \"prostate lesion #2\". It consists of 3, 0.1-1.3 cm pale-tan, delicate needle core biopsies which are wrapped and submitted entirely in 1 cassette.     Note: Bottom of specimen container hand written #1   (LEON Messina (ASCP) 10/24/2024 2:46 PM    Microscopic Description    A-C.  Microscopic " performed   Performing Labs    The technical component of this testing was completed at [facility].     Stain controls for all stains resulted within this report have been reviewed and show appropriate reactivity.    Patient is a 79  year old MALE with history of newly diagnosed moderately risk prostate cancer (group 3 stage T1c). This is a patient with newly diagnosed prostate cancer.  I discussed several management options for prostate cancer with the patient.  The approach is discussed with her active surveillance, radical prostatectomy, radiation therapy in the form of external beam as well as brachytherapy.  We also talked about local therapy options such as cryotherapy and HLFU.  The risks and benefits of each of these approaches were explained in detail.  The risks discussed included risk of incontinence and impotence with surgical therapy.  There is also the immediate perioperative risk of wound infection blood transfusion and rectal injury.  With radiation therapy was discussed where cystitis, proctitis, hematuria, hematochezia.  There is also similar risks of erectile dysfunction with radiation therapy which reduce risk of incontinence.  There is a risk of urinary retention with brachytherapy.  I also explained that cryotherapy and HIFU are still considered less than standard of care as long-term data are lacking.    Natural history of prostate cancer discussed.  Will schedule patient for a bone scan next.  Start patient on Eligard injection.  Side effects discussed which include but not limited to menopausal symptoms, bone and muscle mass loss, mild increased risk of cardiovascular event.  Recheck PSA in 6 months    Over 30 minutes spent for face-to-face consultation, charting.      Video-Visit Details    Type of service:  Video Visit   Video End Time:  Originating Location (pt. Location): Home    Distant Location (provider location):   Off-site  Platform used for Video Visit: Shante  Signed Electronically by: Cullen Berg MD

## 2024-11-15 NOTE — PROGRESS NOTES
Request received by : Cullen Berg MD for Lupron injections every 6 months per notes on 10/31/24. Dr. Que Morales has agreed to over see patients injections here at [facility]. Plan forwarded to provider for review. Talisha Josue RN on 11/15/2024 at 2:21 PM

## 2024-11-18 ENCOUNTER — TELEPHONE (OUTPATIENT)
Dept: UROLOGY | Facility: OTHER | Age: 79
End: 2024-11-18
Payer: COMMERCIAL

## 2024-11-18 DIAGNOSIS — C61 PROSTATE CANCER (H): Primary | ICD-10-CM

## 2024-11-18 NOTE — TELEPHONE ENCOUNTER
Spoke with spouse an patient. We discussed Lupron injection 45 mg IM every 6 months that is being requested by Dr. Berg and will be supervised by Dr. Morales. Reviewed the side effects and risks. They are wanting to stat injections after the holiday ( January 7th). They are not sure if they will pursue farther treatment or not. We discussed that he is feeling well now and that they injection is supposed to help keep his levels low and the cancer from growing/ or growing at a slower pace. This note sent to Dr. Berg and Dr. Morales to update them on the plan of care. Talisha Josue RN on 11/18/2024 at 1:02 PM

## 2024-12-05 NOTE — NURSING NOTE
"Chief Complaint   Patient presents with     Thumb Discomfort     Fish hook in right thumb   He has a fish hook in his thumb. He says he has no recollection of how it happened.  Mandy Mcfarlane LPN..................8/23/2022   5:57 PM      Initial /80   Pulse 67   Temp 98.1  F (36.7  C) (Temporal)   Wt 80.3 kg (177 lb)   SpO2 98%   BMI 26.91 kg/m   Estimated body mass index is 26.91 kg/m  as calculated from the following:    Height as of 5/31/22: 1.727 m (5' 8\").    Weight as of this encounter: 80.3 kg (177 lb).  Medication Reconciliation: complete    FOOD SECURITY SCREENING QUESTIONS  Hunger Vital Signs:  Within the past 12 months we worried whether our food would run out before we got money to buy more. Never  Within the past 12 months the food we bought just didn't last and we didn't have money to get more. Never        Advance care directive on file? no  Advance care directive provided to patient? Thinks he has one     Mandy Mcfarlane LPN  "
Imaging Studies/Medications

## 2025-01-07 ENCOUNTER — ALLIED HEALTH/NURSE VISIT (OUTPATIENT)
Dept: UROLOGY | Facility: OTHER | Age: 80
End: 2025-01-07
Attending: UROLOGY
Payer: MEDICARE

## 2025-01-07 DIAGNOSIS — C61 PROSTATE CANCER (H): Primary | ICD-10-CM

## 2025-01-07 PROCEDURE — 250N000011 HC RX IP 250 OP 636: Mod: JZ | Performed by: UROLOGY

## 2025-01-07 PROCEDURE — 96402 CHEMO HORMON ANTINEOPL SQ/IM: CPT

## 2025-01-07 RX ADMIN — LEUPROLIDE ACETATE 45 MG: KIT at 10:24

## 2025-01-07 NOTE — PROGRESS NOTES
Clinic Administered Medication Documentation      Injectable Medication Documentation    Is there an active order (written within the past 365 days, with administrations remaining, not ) in the chart? Yes.     Patient was given  Lupron 45 mg IM once every 180 days . Prior to medication administration, verified patient's identity using patient s name and date of birth. Please see MAR and medication order for additional information. Patient instructed to remain in clinic for 15 minutes and report any adverse reaction to staff immediately.    Vial/Syringe: Syringe  Was this medication supplied by the patient? No  Is this a medication the patient will need to receive again? Yes. Verified that the patient has refills remaining in their prescription.  Talisha Josue RN on 2025 at 9:47 AM

## 2025-02-24 DIAGNOSIS — I10 ESSENTIAL HYPERTENSION, BENIGN: ICD-10-CM

## 2025-02-26 NOTE — TELEPHONE ENCOUNTER
[facility] - [facility] [address]  sent Rx request for the following:      Requested Prescriptions   Pending Prescriptions Disp Refills    hydroCHLOROthiazide 12.5 MG tablet [Pharmacy Med Name: hydroCHLOROthiazide Oral Tablet 12.5 MG] 90 tablet 3     Sig: TAKE 1 TABLET EVERY DAY       Diuretics (Including Combos) Protocol Failed - 2/26/2025 12:20 PM        Failed - Has GFR on file in past 12 months and most recent value is normal        Failed - Recent (12 mo) or future (90 days) visit within the authorizing provider's specialty     The patient must have completed an in-person or virtual visit within the past 12 months or has a future visit scheduled within the next 90 days with the authorizing provider s specialty.  Urgent care and e-visits do not qualify as an office visit for this protocol.          Passed - Most recent blood pressure under 140/90 in past 12 months     BP Readings from Last 3 Encounters:   10/25/24 126/74   10/24/24 (!) 159/85   09/25/24 117/76       No data recorded         Last Prescription Date:   1/26/24  Last Fill Qty/Refills:         90, R-4    Last Office Visit:              1/26/24 (px)   Future Office visit:            None    Unable to complete prescription refill per RN Medication Refill Policy.     Pt due for annual. Routing to provider for refill consideration. Routing to Unit scheduling pool, to assist Pt in scheduling appointment.     Jefferson Yepez RN on 2/26/2025 at 12:22 PM

## 2025-02-27 RX ORDER — HYDROCHLOROTHIAZIDE 12.5 MG/1
TABLET ORAL
Qty: 90 TABLET | Refills: 0 | Status: SHIPPED | OUTPATIENT
Start: 2025-02-27

## 2025-03-09 ENCOUNTER — HEALTH MAINTENANCE LETTER (OUTPATIENT)
Age: 80
End: 2025-03-09

## 2025-04-12 SDOH — HEALTH STABILITY: PHYSICAL HEALTH: ON AVERAGE, HOW MANY DAYS PER WEEK DO YOU ENGAGE IN MODERATE TO STRENUOUS EXERCISE (LIKE A BRISK WALK)?: 6 DAYS

## 2025-04-12 SDOH — HEALTH STABILITY: PHYSICAL HEALTH: ON AVERAGE, HOW MANY MINUTES DO YOU ENGAGE IN EXERCISE AT THIS LEVEL?: 60 MIN

## 2025-04-12 ASSESSMENT — SOCIAL DETERMINANTS OF HEALTH (SDOH): HOW OFTEN DO YOU GET TOGETHER WITH FRIENDS OR RELATIVES?: ONCE A WEEK

## 2025-04-17 ENCOUNTER — OFFICE VISIT (OUTPATIENT)
Dept: FAMILY MEDICINE | Facility: OTHER | Age: 80
End: 2025-04-17
Attending: FAMILY MEDICINE
Payer: MEDICARE

## 2025-04-17 VITALS
TEMPERATURE: 96.6 F | BODY MASS INDEX: 30.1 KG/M2 | WEIGHT: 191.8 LBS | DIASTOLIC BLOOD PRESSURE: 80 MMHG | HEIGHT: 67 IN | HEART RATE: 60 BPM | RESPIRATION RATE: 16 BRPM | OXYGEN SATURATION: 97 % | SYSTOLIC BLOOD PRESSURE: 132 MMHG

## 2025-04-17 DIAGNOSIS — C61 PROSTATE CANCER (H): ICD-10-CM

## 2025-04-17 DIAGNOSIS — N40.1 BENIGN NON-NODULAR PROSTATIC HYPERPLASIA WITH LOWER URINARY TRACT SYMPTOMS: ICD-10-CM

## 2025-04-17 DIAGNOSIS — N18.31 CHRONIC KIDNEY DISEASE, STAGE 3A (H): ICD-10-CM

## 2025-04-17 DIAGNOSIS — E78.5 HYPERLIPIDEMIA, UNSPECIFIED HYPERLIPIDEMIA TYPE: ICD-10-CM

## 2025-04-17 DIAGNOSIS — G31.84 MILD COGNITIVE IMPAIRMENT: ICD-10-CM

## 2025-04-17 DIAGNOSIS — Z00.00 ENCOUNTER FOR MEDICARE ANNUAL WELLNESS EXAM: Primary | ICD-10-CM

## 2025-04-17 DIAGNOSIS — Z13.29 SCREENING FOR THYROID DISORDER: ICD-10-CM

## 2025-04-17 DIAGNOSIS — I10 ESSENTIAL HYPERTENSION, BENIGN: Chronic | ICD-10-CM

## 2025-04-17 DIAGNOSIS — G91.9 HYDROCEPHALUS, UNSPECIFIED TYPE (H): ICD-10-CM

## 2025-04-17 LAB
ALBUMIN SERPL BCG-MCNC: 4.5 G/DL (ref 3.5–5.2)
ALP SERPL-CCNC: 56 U/L (ref 40–150)
ALT SERPL W P-5'-P-CCNC: 23 U/L (ref 0–70)
ANION GAP SERPL CALCULATED.3IONS-SCNC: 10 MMOL/L (ref 7–15)
AST SERPL W P-5'-P-CCNC: 33 U/L (ref 0–45)
BASOPHILS # BLD AUTO: 0 10E3/UL (ref 0–0.2)
BASOPHILS NFR BLD AUTO: 1 %
BILIRUB SERPL-MCNC: 0.6 MG/DL
BUN SERPL-MCNC: 25 MG/DL (ref 8–23)
CALCIUM SERPL-MCNC: 9.8 MG/DL (ref 8.8–10.4)
CHLORIDE SERPL-SCNC: 105 MMOL/L (ref 98–107)
CHOLEST SERPL-MCNC: 216 MG/DL
CREAT SERPL-MCNC: 1.18 MG/DL (ref 0.67–1.17)
CREAT UR-MCNC: 81.3 MG/DL
EGFRCR SERPLBLD CKD-EPI 2021: 63 ML/MIN/1.73M2
EOSINOPHIL # BLD AUTO: 0.2 10E3/UL (ref 0–0.7)
EOSINOPHIL NFR BLD AUTO: 3 %
ERYTHROCYTE [DISTWIDTH] IN BLOOD BY AUTOMATED COUNT: 12.7 % (ref 10–15)
FASTING STATUS PATIENT QL REPORTED: NO
FASTING STATUS PATIENT QL REPORTED: NO
GLUCOSE SERPL-MCNC: 103 MG/DL (ref 70–99)
HCO3 SERPL-SCNC: 26 MMOL/L (ref 22–29)
HCT VFR BLD AUTO: 38.2 % (ref 40–53)
HDLC SERPL-MCNC: 73 MG/DL
HGB BLD-MCNC: 13.1 G/DL (ref 13.3–17.7)
IMM GRANULOCYTES # BLD: 0 10E3/UL
IMM GRANULOCYTES NFR BLD: 0 %
LDLC SERPL CALC-MCNC: 113 MG/DL
LYMPHOCYTES # BLD AUTO: 0.9 10E3/UL (ref 0.8–5.3)
LYMPHOCYTES NFR BLD AUTO: 16 %
MCH RBC QN AUTO: 31.5 PG (ref 26.5–33)
MCHC RBC AUTO-ENTMCNC: 34.3 G/DL (ref 31.5–36.5)
MCV RBC AUTO: 92 FL (ref 78–100)
MICROALBUMIN UR-MCNC: <12 MG/L
MICROALBUMIN/CREAT UR: NORMAL MG/G{CREAT}
MONOCYTES # BLD AUTO: 0.6 10E3/UL (ref 0–1.3)
MONOCYTES NFR BLD AUTO: 12 %
NEUTROPHILS # BLD AUTO: 3.7 10E3/UL (ref 1.6–8.3)
NEUTROPHILS NFR BLD AUTO: 68 %
NONHDLC SERPL-MCNC: 143 MG/DL
NRBC # BLD AUTO: 0 10E3/UL
NRBC BLD AUTO-RTO: 0 /100
PLATELET # BLD AUTO: 179 10E3/UL (ref 150–450)
POTASSIUM SERPL-SCNC: 4.3 MMOL/L (ref 3.4–5.3)
PROT SERPL-MCNC: 7.2 G/DL (ref 6.4–8.3)
RBC # BLD AUTO: 4.16 10E6/UL (ref 4.4–5.9)
SODIUM SERPL-SCNC: 141 MMOL/L (ref 135–145)
TRIGL SERPL-MCNC: 149 MG/DL
TSH SERPL DL<=0.005 MIU/L-ACNC: 4.17 UIU/ML (ref 0.3–4.2)
WBC # BLD AUTO: 5.5 10E3/UL (ref 4–11)

## 2025-04-17 PROCEDURE — 80053 COMPREHEN METABOLIC PANEL: CPT | Mod: ZL | Performed by: FAMILY MEDICINE

## 2025-04-17 PROCEDURE — 84443 ASSAY THYROID STIM HORMONE: CPT | Mod: ZL | Performed by: FAMILY MEDICINE

## 2025-04-17 PROCEDURE — G0463 HOSPITAL OUTPT CLINIC VISIT: HCPCS

## 2025-04-17 PROCEDURE — 82570 ASSAY OF URINE CREATININE: CPT | Mod: ZL | Performed by: FAMILY MEDICINE

## 2025-04-17 PROCEDURE — 80061 LIPID PANEL: CPT | Mod: ZL | Performed by: FAMILY MEDICINE

## 2025-04-17 PROCEDURE — 36415 COLL VENOUS BLD VENIPUNCTURE: CPT | Mod: ZL | Performed by: FAMILY MEDICINE

## 2025-04-17 PROCEDURE — 82043 UR ALBUMIN QUANTITATIVE: CPT | Mod: ZL | Performed by: FAMILY MEDICINE

## 2025-04-17 PROCEDURE — 85025 COMPLETE CBC W/AUTO DIFF WBC: CPT | Mod: ZL | Performed by: FAMILY MEDICINE

## 2025-04-17 RX ORDER — HYDROXYZINE HYDROCHLORIDE 10 MG/1
10 TABLET, FILM COATED ORAL EVERY 6 HOURS PRN
Qty: 90 TABLET | Refills: 3 | Status: CANCELLED | OUTPATIENT
Start: 2025-04-17

## 2025-04-17 RX ORDER — HYDROCHLOROTHIAZIDE 12.5 MG/1
TABLET ORAL
Qty: 90 TABLET | Refills: 3 | Status: SHIPPED | OUTPATIENT
Start: 2025-04-17

## 2025-04-17 RX ORDER — MECLIZINE HCL 12.5 MG 12.5 MG/1
TABLET ORAL
Qty: 20 TABLET | Refills: 11 | Status: CANCELLED | OUTPATIENT
Start: 2025-04-17

## 2025-04-17 RX ORDER — ROSUVASTATIN CALCIUM 20 MG/1
TABLET, COATED ORAL
Qty: 90 TABLET | Refills: 3 | Status: SHIPPED | OUTPATIENT
Start: 2025-04-17

## 2025-04-17 RX ORDER — TAMSULOSIN HYDROCHLORIDE 0.4 MG/1
CAPSULE ORAL
Qty: 180 CAPSULE | Refills: 3 | Status: SHIPPED | OUTPATIENT
Start: 2025-04-17

## 2025-04-17 RX ORDER — LISINOPRIL 40 MG/1
TABLET ORAL
Qty: 90 TABLET | Refills: 3 | Status: SHIPPED | OUTPATIENT
Start: 2025-04-17

## 2025-04-17 ASSESSMENT — PAIN SCALES - GENERAL: PAINLEVEL_OUTOF10: NO PAIN (0)

## 2025-04-17 NOTE — PATIENT INSTRUCTIONS
Patient Education   Preventive Care Advice   This is general advice given by our system to help you stay healthy. However, your care team may have specific advice just for you. Please talk to your care team about your preventive care needs.  Nutrition  Eat 5 or more servings of fruits and vegetables each day.  Try wheat bread, brown rice and whole grain pasta (instead of white bread, rice, and pasta).  Get enough calcium and vitamin D. Check the label on foods and aim for 100% of the RDA (recommended daily allowance).  Lifestyle  Exercise at least 150 minutes each week  (30 minutes a day, 5 days a week).  Do muscle strengthening activities 2 days a week. These help control your weight and prevent disease.  No smoking.  Wear sunscreen to prevent skin cancer.  Have a dental exam and cleaning every 6 months.  Yearly exams  See your health care team every year to talk about:  Any changes in your health.  Any medicines your care team has prescribed.  Preventive care, family planning, and ways to prevent chronic diseases.  Shots (vaccines)   HPV shots (up to age 26), if you've never had them before.  Hepatitis B shots (up to age 59), if you've never had them before.  COVID-19 shot: Get this shot when it's due.  Flu shot: Get a flu shot every year.  Tetanus shot: Get a tetanus shot every 10 years.  Pneumococcal, hepatitis A, and RSV shots: Ask your care team if you need these based on your risk.  Shingles shot (for age 50 and up)  General health tests  Diabetes screening:  Starting at age 35, Get screened for diabetes at least every 3 years.  If you are younger than age 35, ask your care team if you should be screened for diabetes.  Cholesterol test: At age 39, start having a cholesterol test every 5 years, or more often if advised.  Bone density scan (DEXA): At age 50, ask your care team if you should have this scan for osteoporosis (brittle bones).  Hepatitis C: Get tested at least once in your life.  STIs (sexually  transmitted infections)  Before age 24: Ask your care team if you should be screened for STIs.  After age 24: Get screened for STIs if you're at risk. You are at risk for STIs (including HIV) if:  You are sexually active with more than one person.  You don't use condoms every time.  You or a partner was diagnosed with a sexually transmitted infection.  If you are at risk for HIV, ask about PrEP medicine to prevent HIV.  Get tested for HIV at least once in your life, whether you are at risk for HIV or not.  Cancer screening tests  Cervical cancer screening: If you have a cervix, begin getting regular cervical cancer screening tests starting at age 21.  Breast cancer scan (mammogram): If you've ever had breasts, begin having regular mammograms starting at age 40. This is a scan to check for breast cancer.  Colon cancer screening: It is important to start screening for colon cancer at age 45.  Have a colonoscopy test every 10 years (or more often if you're at risk) Or, ask your provider about stool tests like a FIT test every year or Cologuard test every 3 years.  To learn more about your testing options, visit:   .  For help making a decision, visit:   https://bit.ly/gc73248.  Prostate cancer screening test: If you have a prostate, ask your care team if a prostate cancer screening test (PSA) at age 55 is right for you.  Lung cancer screening: If you are a current or former smoker ages 50 to 80, ask your care team if ongoing lung cancer screenings are right for you.  For informational purposes only. Not to replace the advice of your health care provider. Copyright   2023 [facility]. All rights reserved. Clinically reviewed by the [facility]. [employer] 007839 - REV 01/24.  Learning About Sleeping Well  What does sleeping well mean?     Sleeping well means getting enough sleep to feel good and stay healthy. How much sleep is enough varies among people.  The number of hours you  sleep and how you feel when you wake up are both important. If you do not feel refreshed, you probably need more sleep. Another sign of not getting enough sleep is feeling tired during the day.  Experts recommend that adults get at least 7 or more hours of sleep per day. Children and older adults need more sleep.  Why is getting enough sleep important?  Getting enough quality sleep is a basic part of good health. When your sleep suffers, your physical health, mood, and your thoughts can suffer too. You may find yourself feeling more grumpy or stressed. Not getting enough sleep also can lead to serious problems, including injury, accidents, anxiety, and depression.  What might cause poor sleeping?  Many things can cause sleep problems, including:  Changes to your sleep schedule.  Stress. Stress can be caused by fear about a single event, such as giving a speech. Or you may have ongoing stress, such as worry about work or school.  Depression, anxiety, and other mental or emotional conditions.  Changes in your sleep habits or surroundings. This includes changes that happen where you sleep, such as noise, light, or sleeping in a different bed. It also includes changes in your sleep pattern, such as having jet lag or working a late shift.  Health problems, such as pain, breathing problems, and restless legs syndrome.  Lack of regular exercise.  Using alcohol, nicotine, or caffeine before bed.  How can you help yourself?  Here are some tips that may help you sleep more soundly and wake up feeling more refreshed.  Your sleeping area   Use your bedroom only for sleeping and sex. A bit of light reading may help you fall asleep. But if it doesn't, do your reading elsewhere in the house. Try not to use your TV, computer, smartphone, or tablet while you are in bed.  Be sure your bed is big enough to stretch out comfortably, especially if you have a sleep partner.  Keep your bedroom quiet, dark, and cool. Use curtains, blinds,  "or a sleep mask to block out light. To block out noise, use earplugs, soothing music, or a \"white noise\" machine.  Your evening and bedtime routine   Create a relaxing bedtime routine. You might want to take a warm shower or bath, or listen to soothing music.  Go to bed at the same time every night. And get up at the same time every morning, even if you feel tired.  What to avoid   Limit caffeine (coffee, tea, caffeinated sodas) during the day, and don't have any for at least 6 hours before bedtime.  Avoid drinking alcohol before bedtime. Alcohol can cause you to wake up more often during the night.  Try not to smoke or use tobacco, especially in the evening. Nicotine can keep you awake.  Limit naps during the day, especially close to bedtime.  Avoid lying in bed awake for too long. If you can't fall asleep or if you wake up in the middle of the night and can't get back to sleep within about 20 minutes, get out of bed and go to another room until you feel sleepy.  Avoid taking medicine right before bed that may keep you awake or make you feel hyper or energized. Your doctor can tell you if your medicine may do this and if you can take it earlier in the day.  If you can't sleep   Imagine yourself in a peaceful, pleasant scene. Focus on the details and feelings of being in a place that is relaxing.  Get up and do a quiet or boring activity until you feel sleepy.  Avoid drinking any liquids before going to bed to help prevent waking up often to use the bathroom.  Where can you learn more?  Go to https://www.[employer].net/patiented  Enter J942 in the search box to learn more about \"Learning About Sleeping Well.\"  Current as of: July 31, 2024  Content Version: 14.4    1440-0946 [employer].   Care instructions adapted under license by your healthcare professional. If you have questions about a medical condition or this instruction, always ask your healthcare professional. [employer] disclaims any " warranty or liability for your use of this information.    Bladder Training: Care Instructions  Your Care Instructions     Bladder training is used to treat urge incontinence and stress incontinence. Urge incontinence means that the need to urinate comes on so fast that you can't get to a toilet in time. Stress incontinence means that you leak urine because of pressure on your bladder. For example, it may happen when you laugh, cough, or lift something heavy.  Bladder training can increase how long you can wait before you have to urinate. It can also help your bladder hold more urine. And it can give you better control over the urge to urinate.  It is important to remember that bladder training takes a few weeks to a few months to make a difference. You may not see results right away, but don't give up.  Follow-up care is a key part of your treatment and safety. Be sure to make and go to all appointments, and call your doctor if you are having problems. It's also a good idea to know your test results and keep a list of the medicines you take.  How can you care for yourself at home?  Work with your doctor to come up with a bladder training program that is right for you. You may use one or more of the following methods.  Delayed urination  In the beginning, try to keep from urinating for 5 minutes after you first feel the need to go.  While you wait, take deep, slow breaths to relax. Kegel exercises can also help you delay the need to go to the bathroom.  After some practice, when you can easily wait 5 minutes to urinate, try to wait 10 minutes before you urinate.  Slowly increase the waiting period until you are able to control when you have to urinate.  Scheduled urination  Empty your bladder when you first wake up in the morning.  Schedule times throughout the day when you will urinate.  Start by going to the bathroom every hour, even if you don't need to go.  Slowly increase the time between trips to the  "bathroom.  When you have found a schedule that works well for you, keep doing it.  If you wake up during the night and have to urinate, do it. Apply your schedule to waking hours only.  Kegel exercises  These tighten and strengthen pelvic muscles, which can help you control the flow of urine. (If doing these exercises causes pain, stop doing them and talk with your doctor.) To do Kegel exercises:  Squeeze your muscles as if you were trying not to pass gas. Or squeeze your muscles as if you were stopping the flow of urine. Your belly, legs, and buttocks shouldn't move.  Hold the squeeze for 3 seconds, then relax for 5 to 10 seconds.  Start with 3 seconds, then add 1 second each week until you are able to squeeze for 10 seconds.  Repeat the exercise 10 times a session. Do 3 to 8 sessions a day.  When should you call for help?  Watch closely for changes in your health, and be sure to contact your doctor if:    Your incontinence is getting worse.     You do not get better as expected.   Where can you learn more?  Go to https://www.[employer].net/patiented  Enter V684 in the search box to learn more about \"Bladder Training: Care Instructions.\"  Current as of: April 30, 2024  Content Version: 14.4    7942-8488 [employer].   Care instructions adapted under license by your healthcare professional. If you have questions about a medical condition or this instruction, always ask your healthcare professional. [employer] disclaims any warranty or liability for your use of this information.       "

## 2025-04-17 NOTE — NURSING NOTE
Chief Complaint   Patient presents with    Wellness Visit         Medication Reconciliation: complete    [place] Cota, LPN

## 2025-04-17 NOTE — PROGRESS NOTES
Preventive Care Visit  [facility] AND [facility]  Stacey Hurley MD, Family Medicine  Apr 17, 2025          Damaso Guidry is a 79 year old, presenting for the following:  Wellness Visit        4/17/2025     9:28 AM   Additional Questions   Roomed by Dikennedy Cota         Chao is here today for a Medicare Wellness Visit.  He had previously followed with Chan Cook MD for many years and is here to establish care as well.  He has a history of a subarachnoid hemorrhage several years ago and has had some cognitive impairment subsequently.  He is otherwise doing well.  He still lives independently with his wife at home.     Advance Care Planning    Document on file is a Health Care Directive or POLST.        4/12/2025   General Health   How would you rate your overall physical health? Good   Feel stress (tense, anxious, or unable to sleep) Not at all         4/12/2025   Nutrition   Diet: Low fat/cholesterol         4/12/2025   Exercise   Days per week of moderate/strenous exercise 6 days   Average minutes spent exercising at this level 60 min         4/12/2025   Social Factors   Frequency of gathering with friends or relatives Once a week   Worry food won't last until get money to buy more No   Food not last or not have enough money for food? No   Do you have housing? (Housing is defined as stable permanent housing and does not include staying ouside in a car, in a tent, in an abandoned building, in an overnight shelter, or couch-surfing.) Yes   Are you worried about losing your housing? No   Lack of transportation? No   Unable to get utilities (heat,electricity)? No         4/12/2025   Fall Risk   Fallen 2 or more times in the past year? No   Trouble with walking or balance? No          4/12/2025   Activities of Daily Living- Home Safety   Needs help with the following daily activites None of the above   Safety concerns in the home None of the above         4/12/2025   Dental   Dentist two times  every year? (!) NO         2025   Hearing Screening   Hearing concerns? None of the above         2025   Driving Risk Screening   Patient/family members have concerns about driving No         2025   General Alertness/Fatigue Screening   Have you been more tired than usual lately? (!) YES         2025   Urinary Incontinence Screening   Bothered by leaking urine in past 6 months Yes         Today's PHQ-2 Score:       2025     9:23 AM   PHQ-2 (  [employer])   Q1: Little interest or pleasure in doing things 0   Q2: Feeling down, depressed or hopeless 0   PHQ-2 Score 0    Q1: Little interest or pleasure in doing things Not at all   Q2: Feeling down, depressed or hopeless Not at all   PHQ-2 Score 0       Patient-reported           2025   Substance Use   Alcohol more than 3/day or more than 7/wk No   Do you have a current opioid prescription? No   How severe/bad is pain from 1 to 10? 0/10 (No Pain)   Do you use any other substances recreationally? No     Social History     Tobacco Use    Smoking status: Former     Current packs/day: 0.00     Types: Cigarettes     Quit date: 1970     Years since quittin.3     Passive exposure: Past    Smokeless tobacco: Never    Tobacco comments:     smoked off and on for 2 years   Vaping Use    Vaping status: Never Used   Substance Use Topics    Alcohol use: Yes     Alcohol/week: 2.0 standard drinks of alcohol     Types: 2 Cans of beer per week     Comment: 2 times a week/2 drinks    Drug use: No       ASCVD Risk   The 10-year ASCVD risk score (Corina CHOWDHURY, et al., 2019) is: 35%    Values used to calculate the score:      Age: 79 years      Sex: Male      Is Non- : No      Diabetic: No      Tobacco smoker: No      Systolic Blood Pressure: 132 mmHg      Is BP treated: Yes      HDL Cholesterol: 61 mg/dL      Total Cholesterol: 185 mg/dL            Reviewed and updated as needed this visit by Provider                     Past Medical History:   Diagnosis Date    Actinic keratosis     No Comments Provided    Anemia     No Comments Provided    Diverticulosis of large intestine without perforation or abscess without bleeding     No Comments Provided    Elevated prostate specific antigen (PSA)     No Comments Provided    Essential (primary) hypertension     No Comments Provided    Generalized anxiety disorder     No Comments Provided    History of colonic polyps     No Comments Provided    Hydrocephalus (H)     (obstructive)    Hyperlipidemia     No Comments Provided    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 Marin 1/24/05    Nontraumatic subarachnoid hemorrhage (H)     No Comments Provided    Osteoarthritis of hip     No Comments Provided    Other seborrheic keratosis     No Comments Provided    Other specified disorders of bone, unspecified site (CODE)     1/05,Lingual sean, bony growth, lower mandible, evaluation Dr. Marin 1/05    Pain in right hip     No Comments Provided    Personal history of other medical treatment (CODE)     05/16/05,Q wave in 3 and AVF leads on electrocardiogram done    Plantar fascial fibromatosis     No Comments Provided    Retention of urine     No Comments Provided    Screening for other and unspecified cardiovascular conditions     05/20/2005,Stress echocardiogram is negative for ischemia with ejection fraction of 85%    Stiffness of unspecified joint, not elsewhere classified     No Comments Provided     Past Surgical History:   Procedure Laterality Date    ARTHROPLASTY HIP ANTERIOR Right 5/17/2022    Procedure: ARTHROPLASTY, HIP, TOTAL, DIRECT ANTERIOR APPROACH;  Surgeon: Luis Miguel Lama MD;  Location:  OR    COLONOSCOPY  04/26/2010    (04/26/2010),F/U 2015    COLONOSCOPY  10/15/2015    10/15/15,F/U 2025    EXTRACAPSULAR CATARACT EXTRATION WITH INTRAOCULAR LENS IMPLANT       2016    OTHER SURGICAL HISTORY      3/26/14,47089.0,[place] TOTAL HIP ARTHROPLASTY,Left,direct anterior approach, Dr. Lama    OTHER SURGICAL HISTORY      2014,[id number],CRANIOTOMY    OTHER SURGICAL HISTORY      11/29/2016,,HERNIA REPAIR,Left,LIH with mesh     BP Readings from Last 3 Encounters:   04/17/25 132/80   10/25/24 126/74   10/24/24 (!) 159/85    Wt Readings from Last 3 Encounters:   04/17/25 87 kg (191 lb 12.8 oz)   10/25/24 84.1 kg (185 lb 6.4 oz)   09/25/24 83.5 kg (184 lb)                  Patient Active Problem List   Diagnosis    Actinic keratosis    Anemia    Generalized anxiety disorder    Benign non-nodular prostatic hyperplasia with lower urinary tract symptoms    Brain injury (H)    Diverticulosis of sigmoid colon    Elevated prostate specific antigen (PSA)    H/O adenomatous polyp of colon    Hyperlipidemia    Essential hypertension, benign    Hydrocephalus (H)    Plantar fasciitis    Right hip pain    SAH (subarachnoid hemorrhage) (H)    Seborrheic keratosis    Retention of urine    Cerebral ventriculomegaly    Short-term memory loss    Pre-diabetes    Prostate cancer (H)    Chronic kidney disease, stage 3a (H)    Mild cognitive impairment     Past Surgical History:   Procedure Laterality Date    ARTHROPLASTY HIP ANTERIOR Right 5/17/2022    Procedure: ARTHROPLASTY, HIP, TOTAL, DIRECT ANTERIOR APPROACH;  Surgeon: Luis Miguel Lama MD;  Location:  OR    COLONOSCOPY  04/26/2010    (04/26/2010),F/U 2015    COLONOSCOPY  10/15/2015    10/15/15,F/U 2025    EXTRACAPSULAR CATARACT EXTRATION WITH INTRAOCULAR LENS IMPLANT      2016    OTHER SURGICAL HISTORY      3/26/14,47434.0,[place] TOTAL [place]IP ARTHROPLASTY,Left,direct anterior approach, Dr. Lama    OTHER SURGICAL HISTORY      2014,[id number],CRANIOTOMY    OTHER SURGICAL HISTORY      11/29/2016,,HERNIA REPAIR,Left,LIH with mesh       Social History     Tobacco Use    Smoking status: Former     Current packs/day: 0.00     Types: Cigarettes     Quit  date: 1970     Years since quittin.3     Passive exposure: Past    Smokeless tobacco: Never    Tobacco comments:     smoked off and on for 2 years   Substance Use Topics    Alcohol use: Yes     Alcohol/week: 2.0 standard drinks of alcohol     Types: 2 Cans of beer per week     Comment: 2 times a week/2 drinks     Family History   Problem Relation Age of Onset    Cancer Mother 78        Cancer, lung cancer at age 78, developed brain metastases    Heart Disease Father 50        Heart Disease,heart failure    Other - See Comments Father          MS, long-standing    Cancer Paternal Grandfather         Cancer, of cancer of unknown type    Other - See Comments Sister          MS         Current Outpatient Medications   Medication Sig Dispense Refill    aspirin 81 MG EC tablet Take 1 tablet (81 mg) by mouth 2 times daily 60 tablet 0    Cholecalciferol (VITAMIN D3) 1000 UNITS CAPS Take 1 capsule by mouth daily      finasteride (PROSCAR) 5 MG tablet Take 1 tablet (5 mg) by mouth daily. 90 tablet 3    hydroCHLOROthiazide 12.5 MG tablet TAKE 1 TABLET EVERY DAY 90 tablet 3    hydrOXYzine HCl (ATARAX) 10 MG tablet Take 1 tablet (10 mg) by mouth every 6 hours as needed for itching or anxiety (with pain, moderate pain) 30 tablet 1    lisinopril (ZESTRIL) 40 MG tablet TAKE 1 TABLET EVERY DAY 90 tablet 3    meclizine (ANTIVERT) 12.5 MG tablet TAKE 2 TABLETS FOUR TIMES DAILY AS NEEDED FOR DIZZINESS 20 tablet 0    Multiple Vitamins-Minerals (MULTILEX-T&M) TABS Take 1 tablet by mouth daily      rosuvastatin (CRESTOR) 20 MG tablet TAKE 1 TABLET AT BEDTIME 90 tablet 3    tamsulosin (FLOMAX) 0.4 MG capsule TAKE 2 CAPSULES EVERY DAY WITH FOOD 180 capsule 3    triamcinolone (KENALOG) 0.1 % external cream Apply topically to affected area(s) 3 times daily. 30 g 1     Allergies   Allergen Reactions    Fluorescein-Benoxinate Other (See Comments)     Does not recall reaction, eye reaction     Current providers sharing in care for  "this patient include:  Patient Care Team:  Stacey Hurley MD as PCP - General (Family Medicine)  Cullen Berg MD as MD (Urology)  Cullen Berg MD as Assigned Surgical Provider  Harrison Arguelles MD as Assigned PCP    The following health maintenance items are reviewed in Epic and correct as of today:  Health Maintenance   Topic Date Due    MICROALBUMIN  Never done    LIPID  01/26/2025    HEMOGLOBIN  01/26/2025    BMP  05/17/2025    COVID-19 Vaccine (8 - 2024-25 season) 09/01/2025 (Originally 3/27/2025)    MEDICARE ANNUAL WELLNESS VISIT  04/17/2026    FALL RISK ASSESSMENT  04/17/2026    DIABETES SCREENING  05/17/2027    ADVANCE CARE PLANNING  04/17/2030    DTAP/TDAP/TD IMMUNIZATION (2 - Td or Tdap) 08/03/2030    PHQ-2 (once per calendar year)  Completed    INFLUENZA VACCINE  Completed    Pneumococcal Vaccine: 50+ Years  Completed    URINALYSIS  Completed    ZOSTER IMMUNIZATION  Completed    RSV VACCINE  Completed    HPV IMMUNIZATION  Aged Out    MENINGITIS IMMUNIZATION  Aged Out    HEPATITIS C SCREENING  Discontinued    COLORECTAL CANCER SCREENING  Discontinued         Review of Systems  Constitutional, HEENT, cardiovascular, pulmonary, GI, , musculoskeletal, neuro, skin, endocrine and psych systems are negative, except as otherwise noted.     Objective    Exam  /80   Pulse 60   Temp (!) 96.6  F (35.9  C) (Tympanic)   Resp 16   Ht 1.702 m (5' 7\")   Wt 87 kg (191 lb 12.8 oz)   SpO2 97%   BMI 30.04 kg/m     Estimated body mass index is 30.04 kg/m  as calculated from the following:    Height as of this encounter: 1.702 m (5' 7\").    Weight as of this encounter: 87 kg (191 lb 12.8 oz).    Physical Exam  Constitutional:       General: He is not in acute distress.     Appearance: Normal appearance.   HENT:      Head: Normocephalic.      Right Ear: Tympanic membrane, ear canal and external ear normal.      Left Ear: Tympanic membrane, ear canal and external ear normal.      Nose: " Nose normal.      Mouth/Throat:      Mouth: Mucous membranes are moist.      Pharynx: Oropharynx is clear. No oropharyngeal exudate or posterior oropharyngeal erythema.   Eyes:      Extraocular Movements: Extraocular movements intact.      Conjunctiva/sclera: Conjunctivae normal.      Pupils: Pupils are equal, round, and reactive to light.   Cardiovascular:      Rate and Rhythm: Normal rate and regular rhythm.      Pulses: Normal pulses.      Heart sounds: Normal heart sounds. No murmur heard.  Pulmonary:      Effort: Pulmonary effort is normal.      Breath sounds: Normal breath sounds. No wheezing, rhonchi or rales.   Abdominal:      General: Abdomen is flat. Bowel sounds are normal. There is no distension.      Palpations: Abdomen is soft. There is no mass.      Tenderness: There is no abdominal tenderness.      Hernia: No hernia is present.   Musculoskeletal:         General: Normal range of motion.      Cervical back: Normal range of motion and neck supple.      Right lower leg: No edema.      Left lower leg: No edema.   Lymphadenopathy:      Cervical: No cervical adenopathy.   Skin:     General: Skin is warm and dry.      Capillary Refill: Capillary refill takes less than 2 seconds.      Findings: No rash.   Neurological:      General: No focal deficit present.      Mental Status: He is alert and oriented to person, place, and time.      Cranial Nerves: No cranial nerve deficit.   Psychiatric:         Mood and Affect: Mood normal.               4/17/2025   Mini Cog   Clock Draw Score 0 Abnormal   3 Item Recall 0 objects recalled   Mini Cog Total Score 0              ICD-10-CM    1. Encounter for Medicare annual wellness exam  Z00.00       2. Hyperlipidemia, unspecified hyperlipidemia type  E78.5 Lipid Profile     Comprehensive metabolic panel     rosuvastatin (CRESTOR) 20 MG tablet     Lipid Profile     Comprehensive metabolic panel      3. Essential hypertension, benign  I10 Comprehensive metabolic panel      hydroCHLOROthiazide 12.5 MG tablet     lisinopril (ZESTRIL) 40 MG tablet     Comprehensive metabolic panel      4. Chronic kidney disease, stage 3a (H)  N18.31 Comprehensive metabolic panel     CBC with Platelets & Differential     Albumin Random Urine Quantitative with Creat Ratio     Comprehensive metabolic panel     CBC with Platelets & Differential     Albumin Random Urine Quantitative with Creat Ratio      5. Mild cognitive impairment  G31.84       6. Hydrocephalus, unspecified type (H)  G91.9       7. Prostate cancer (H)  C61       8. Benign non-nodular prostatic hyperplasia with lower urinary tract symptoms  N40.1 tamsulosin (FLOMAX) 0.4 MG capsule      9. Screening for thyroid disorder  Z13.29 TSH with free T4 reflex     TSH with free T4 reflex           PSA is up to date.  He has a history of prostate cancer and follows with Urology, who follows this test.  Colonoscopy last completed 10/15/15 and was normal.  Discussed no longer doing these due to age >75, which he agrees with.  Vaccines reviewed and are all up to date.  Labs completed as noted.  Rosuvastatin refilled.  Labs as above.  Blood pressure stable.  Refill as above.  Labs as above.    Stable.  He is still living independently with his wife and is doing well.  Stable.  Has not needed to see Urology for about 10 years.  Stable.  He has been managed by Urology.  Stable.  Refill as above.  TSH ordered as above.     No follow-ups on file.     The longitudinal plan of care for the diagnosis(es)/condition(s) as documented were addressed during this visit. Due to the added complexity in care, I will continue to support Chao in the subsequent management and with ongoing continuity of care.      Stacey Hurley MD

## 2025-05-07 NOTE — PROGRESS NOTES
SAFETY CHECKLIST  ID Bands and Risk clasps correct and in place (DNR, Fall risk, Allergy, Latex, Limb):  Yes  All Lines Reconciled and labeled correctly: Yes  Whiteboard updated:Yes  Environmental interventions (bed/chair alarm on, call light, side rails, restraints, sitter....): Yes  Verify Tele #:      SW attempted to contact pt to discuss financial assistance. SW was unable to reach pt and left a VM with SS callback number. SW will remain available.

## 2025-06-24 ENCOUNTER — ALLIED HEALTH/NURSE VISIT (OUTPATIENT)
Dept: UROLOGY | Facility: OTHER | Age: 80
End: 2025-06-24
Attending: UROLOGY
Payer: MEDICARE

## 2025-06-24 DIAGNOSIS — C61 PROSTATE CANCER (H): Primary | ICD-10-CM

## 2025-06-24 PROCEDURE — 96402 CHEMO HORMON ANTINEOPL SQ/IM: CPT

## 2025-06-24 PROCEDURE — 250N000011 HC RX IP 250 OP 636: Mod: JZ | Performed by: UROLOGY

## 2025-06-24 RX ADMIN — LEUPROLIDE ACETATE 45 MG: KIT at 10:24

## 2025-06-24 NOTE — PROGRESS NOTES
Clinic Administered Medication Documentation      Injectable Medication Documentation    Is there an active order (written within the past 365 days, with administrations remaining, not ) in the chart? Yes.     Patient was given Lupron 22.5 mg IM. Prior to medication administration, verified patient's identity using patient s name and date of birth. Please see MAR and medication order for additional information. Patient instructed to remain in clinic for 15 minutes and report any adverse reaction to staff immediately.    Vial/Syringe: Single dose vial. Was entire vial of medication used? Yes  Was this medication supplied by the patient? No  Is this a medication the patient will need to receive again? No - not necessary to check for refills remaining.  Talisha Josue RN on 2025 at 10:53 AM

## 2025-07-09 ENCOUNTER — OFFICE VISIT (OUTPATIENT)
Dept: FAMILY MEDICINE | Facility: OTHER | Age: 80
End: 2025-07-09
Attending: FAMILY MEDICINE
Payer: MEDICARE

## 2025-07-09 VITALS
SYSTOLIC BLOOD PRESSURE: 130 MMHG | WEIGHT: 187 LBS | DIASTOLIC BLOOD PRESSURE: 81 MMHG | HEIGHT: 68 IN | TEMPERATURE: 97.1 F | OXYGEN SATURATION: 97 % | BODY MASS INDEX: 28.34 KG/M2 | RESPIRATION RATE: 16 BRPM | HEART RATE: 50 BPM

## 2025-07-09 DIAGNOSIS — R41.0 EPISODIC CONFUSION: Primary | ICD-10-CM

## 2025-07-09 RX ORDER — ALPRAZOLAM 0.5 MG
TABLET ORAL
Qty: 1 TABLET | Refills: 0 | Status: SHIPPED | OUTPATIENT
Start: 2025-07-09

## 2025-07-09 ASSESSMENT — PAIN SCALES - GENERAL: PAINLEVEL_OUTOF10: NO PAIN (0)

## 2025-07-09 NOTE — PROGRESS NOTES
"Patient arrives here with his wife.  Assessment & Plan     Episodic confusion    - MR Brain w/o Contrast; Future  - ALPRAZolam (XANAX) 0.5 MG tablet; Take one pill 1 hour prior to procedure  Patient has episodes of episodic confusion.  This is no.  Wife reports somewhat alarming.  Will proceed with an MRI.  Otherwise the rest of neurologic system seems to be intact.  Will follow-up after the MRI  BMI  Estimated body mass index is 28.43 kg/m  as calculated from the following:    Height as of this encounter: 1.727 m (5' 8\").    Weight as of this encounter: 84.8 kg (187 lb).           Damaso Guidry is a 79 year old, presenting for the following health issues:  UTI      7/9/2025     9:04 AM   Additional Questions   Roomed by Carla HECTOR   Accompanied by Juana     History of Present Illness       Reason for visit:  Confusion  Symptom onset:  1-2 weeks ago  Symptoms include:  Confusion  Symptom intensity:  Moderate  Symptom progression:  Staying the same  Had these symptoms before:  Yes  Has tried/received treatment for these symptoms:  No  What makes it worse:  No  What makes it better:  No   He is taking medications regularly.        Patient cannot offer any memory because of his memory loss.  His wife states that he has had episodes of confusion on and off over the last 2 to 3 weeks.  At times he did not recognize her.  According to it \"do I know you\" \"do we live here\".  He has had short-term memories in the past but this is new.  His long-term memory has always been intact.  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  Resulting in extremely short-term memory.  He has not taken any new medications.  That would cause a cognitive dysfunction.  Is is not constant.  But has occurred a few times a week over the last 3 weeks.  No other neurologic complaints such as headache weakness in the upper or lower extremity.  Changes in bowel or bladder habits.              Objective    /81 (BP Location: Right arm, Patient Position: Sitting, " "Cuff Size: Adult Regular)   Pulse 50   Temp 97.1  F (36.2  C) (Temporal)   Resp 16   Ht 1.727 m (5' 8\")   Wt 84.8 kg (187 lb)   SpO2 97%   BMI 28.43 kg/m    Body mass index is 28.43 kg/m .  Physical Exam  Constitutional:       Appearance: Normal appearance.   HENT:      Head: Normocephalic.   Neurological:      General: No focal deficit present.      Mental Status: He is alert.      Comments: Fast alternating movement f satisfactory.  Finger-nose-finger there is some past-pointing.  But equal bilateral.   Psychiatric:         Mood and Affect: Mood normal.                    Signed Electronically by: Cullen Grier MD    "

## 2025-07-09 NOTE — NURSING NOTE
"Chief Complaint   Patient presents with    UTI       Initial /81 (BP Location: Right arm, Patient Position: Sitting, Cuff Size: Adult Regular)   Pulse 50   Temp 97.1  F (36.2  C) (Temporal)   Resp 16   Ht 1.727 m (5' 8\")   Wt 84.8 kg (187 lb)   SpO2 97%   BMI 28.43 kg/m   Estimated body mass index is 28.43 kg/m  as calculated from the following:    Height as of this encounter: 1.727 m (5' 8\").    Weight as of this encounter: 84.8 kg (187 lb).  Medication Review: complete    The next two questions are to help us understand your food security.  If you are feeling you need any assistance in this area, we have resources available to support you today.          4/12/2025   SDOH- Food Insecurity   Within the past 12 months, did you worry that your food would run out before you got money to buy more? N   Within the past 12 months, did the food you bought just not last and you didn t have money to get more? N         Health Care Directive:  Patient has a Health Care Directive on file      Carla Pope      "

## 2025-07-25 ENCOUNTER — HOSPITAL ENCOUNTER (OUTPATIENT)
Dept: MRI IMAGING | Facility: OTHER | Age: 80
Discharge: HOME | End: 2025-07-25
Attending: FAMILY MEDICINE | Admitting: RADIOLOGY
Payer: MEDICARE

## 2025-07-25 DIAGNOSIS — R41.0 EPISODIC CONFUSION: ICD-10-CM

## 2025-07-25 PROCEDURE — 255N000002 HC RX 255 OP 636: Performed by: FAMILY MEDICINE

## 2025-07-25 PROCEDURE — A9575 INJ GADOTERATE MEGLUMI 0.1ML: HCPCS | Performed by: FAMILY MEDICINE

## 2025-07-25 PROCEDURE — 70553 MRI BRAIN STEM W/O & W/DYE: CPT

## 2025-07-25 PROCEDURE — 70553 MRI BRAIN STEM W/O & W/DYE: CPT | Mod: 26 | Performed by: RADIOLOGY

## 2025-07-25 RX ORDER — GADOTERATE MEGLUMINE 376.9 MG/ML
20 INJECTION INTRAVENOUS ONCE
Status: COMPLETED | OUTPATIENT
Start: 2025-07-25 | End: 2025-07-25

## 2025-07-25 RX ADMIN — GADOTERATE MEGLUMINE 17 ML: 376.9 INJECTION INTRAVENOUS at 08:57

## 2025-07-26 ENCOUNTER — MYC MEDICAL ADVICE (OUTPATIENT)
Dept: UROLOGY | Facility: CLINIC | Age: 80
End: 2025-07-26
Payer: COMMERCIAL

## 2025-07-26 DIAGNOSIS — N40.1 BPH WITH OBSTRUCTION/LOWER URINARY TRACT SYMPTOMS: ICD-10-CM

## 2025-07-26 DIAGNOSIS — N13.8 BPH WITH OBSTRUCTION/LOWER URINARY TRACT SYMPTOMS: ICD-10-CM

## 2025-07-28 ENCOUNTER — RESULTS FOLLOW-UP (OUTPATIENT)
Dept: FAMILY MEDICINE | Facility: OTHER | Age: 80
End: 2025-07-28
Payer: COMMERCIAL

## 2025-07-28 RX ORDER — FINASTERIDE 5 MG/1
5 TABLET, FILM COATED ORAL DAILY
Qty: 90 TABLET | Refills: 0 | Status: SHIPPED | OUTPATIENT
Start: 2025-07-28

## 2025-08-10 ASSESSMENT — ANXIETY QUESTIONNAIRES
5. BEING SO RESTLESS THAT IT IS HARD TO SIT STILL: NOT AT ALL
GAD7 TOTAL SCORE: 1
7. FEELING AFRAID AS IF SOMETHING AWFUL MIGHT HAPPEN: NOT AT ALL
7. FEELING AFRAID AS IF SOMETHING AWFUL MIGHT HAPPEN: NOT AT ALL
8. IF YOU CHECKED OFF ANY PROBLEMS, HOW DIFFICULT HAVE THESE MADE IT FOR YOU TO DO YOUR WORK, TAKE CARE OF THINGS AT HOME, OR GET ALONG WITH OTHER PEOPLE?: NOT DIFFICULT AT ALL
GAD7 TOTAL SCORE: 1
4. TROUBLE RELAXING: NOT AT ALL
2. NOT BEING ABLE TO STOP OR CONTROL WORRYING: NOT AT ALL
IF YOU CHECKED OFF ANY PROBLEMS ON THIS QUESTIONNAIRE, HOW DIFFICULT HAVE THESE PROBLEMS MADE IT FOR YOU TO DO YOUR WORK, TAKE CARE OF THINGS AT HOME, OR GET ALONG WITH OTHER PEOPLE: NOT DIFFICULT AT ALL
GAD7 TOTAL SCORE: 1
1. FEELING NERVOUS, ANXIOUS, OR ON EDGE: SEVERAL DAYS
6. BECOMING EASILY ANNOYED OR IRRITABLE: NOT AT ALL
3. WORRYING TOO MUCH ABOUT DIFFERENT THINGS: NOT AT ALL

## 2025-08-12 ENCOUNTER — OFFICE VISIT (OUTPATIENT)
Dept: FAMILY MEDICINE | Facility: OTHER | Age: 80
End: 2025-08-12
Attending: FAMILY MEDICINE
Payer: MEDICARE

## 2025-08-12 VITALS
BODY MASS INDEX: 28.19 KG/M2 | OXYGEN SATURATION: 96 % | SYSTOLIC BLOOD PRESSURE: 134 MMHG | TEMPERATURE: 98.1 F | RESPIRATION RATE: 16 BRPM | DIASTOLIC BLOOD PRESSURE: 74 MMHG | WEIGHT: 185.4 LBS | HEART RATE: 62 BPM

## 2025-08-12 DIAGNOSIS — R41.89 COGNITIVE IMPAIRMENT: Primary | ICD-10-CM

## 2025-08-12 PROCEDURE — G0463 HOSPITAL OUTPT CLINIC VISIT: HCPCS

## 2025-08-12 ASSESSMENT — PAIN SCALES - GENERAL: PAINLEVEL_OUTOF10: NO PAIN (0)

## 2025-08-13 ENCOUNTER — PATIENT OUTREACH (OUTPATIENT)
Dept: CARE COORDINATION | Facility: CLINIC | Age: 80
End: 2025-08-13
Payer: COMMERCIAL

## (undated) DEVICE — PEN MARKING SKIN W/LABELS 31145918

## (undated) DEVICE — HOOD T4 PROTECTIVE STERI FACE SHIELD 400-800

## (undated) DEVICE — ESU SUCTION COAG CAUTERY HAND CONTROL 10FR 6" 30-5200

## (undated) DEVICE — PAD FLOOR SURGISAFE 46X40" 84610

## (undated) DEVICE — PREP CHLORAPREP 26ML TINTED ORANGE  260815

## (undated) DEVICE — COVER LIGHT HANDLE LT-F02

## (undated) DEVICE — ADH SKIN CLOSURE PREMIERPRO EXOFIN 1.0ML 3470

## (undated) DEVICE — TUBING SUCTION 10'X3/16" N510

## (undated) DEVICE — PAD POLAR UNIV KNEE/SHOULDER LF 02320

## (undated) DEVICE — DRSG AQUACEL AG 3.5X12" HYDROFIBER 420670

## (undated) DEVICE — SU VICRYL 2-0 CT-1 36" UND J945H

## (undated) DEVICE — DRAPE TOWEL 17X27" BLUE LF DISP 28700-004

## (undated) DEVICE — GLOVE PROTEXIS PI ORTHO PF 7.5 2D73HT75

## (undated) DEVICE — STPL SKIN PRECISE SYSTEM DS-15

## (undated) DEVICE — SLEEVE COMPRESSION SCD KNEE MED 74022

## (undated) DEVICE — BLADE SAW OSCIL/SAG STRK 25X90X1.27MM 4125-127-090

## (undated) DEVICE — ESU GROUND PAD ADULT W/CORD E7507

## (undated) DEVICE — SU VICRYL 1 CT-1 36" J347H

## (undated) DEVICE — DRAPE STERI U 1015

## (undated) DEVICE — DRAPE C-ARM PACK 9"

## (undated) DEVICE — ESU BIPOLAR SEALER AQUAMANTYS 6MM 23-112-1

## (undated) DEVICE — DRAPE TOP SURGICAL HD 59352

## (undated) DEVICE — DRAPE IOBAN ISOLATION VERTICAL 320X21CM 6617

## (undated) DEVICE — PACK MAJOR ORTHO SOP15MOFCA

## (undated) DEVICE — SUCTION MANIFOLD NEPTUNE 2 SYS 4 PORT 0702-020-000

## (undated) DEVICE — SOL WATER 1500ML

## (undated) DEVICE — GLOVE BIOGEL PI INDICATOR 8.0 LF 41680

## (undated) DEVICE — DRAPE U SHEET SPLIT W/TAPE 89079

## (undated) DEVICE — GLOVE PROTEXIS POWDER FREE SMT 7.0  2D72PT70X

## (undated) DEVICE — ESU ELEC BLADE 6" COATED E1450-6

## (undated) DEVICE — ESU PENCIL SMOKE EVAC W/ROCKER SWITCH 0703-047-000

## (undated) RX ORDER — LIDOCAINE HYDROCHLORIDE 10 MG/ML
INJECTION, SOLUTION EPIDURAL; INFILTRATION; INTRACAUDAL; PERINEURAL
Status: DISPENSED
Start: 2022-08-23

## (undated) RX ORDER — EPHEDRINE SULFATE 50 MG/ML
INJECTION, SOLUTION INTRAMUSCULAR; INTRAVENOUS; SUBCUTANEOUS
Status: DISPENSED
Start: 2022-05-17

## (undated) RX ORDER — ONDANSETRON 2 MG/ML
INJECTION INTRAMUSCULAR; INTRAVENOUS
Status: DISPENSED
Start: 2022-05-17

## (undated) RX ORDER — DEXAMETHASONE SODIUM PHOSPHATE 10 MG/ML
INJECTION, SOLUTION INTRAMUSCULAR; INTRAVENOUS
Status: DISPENSED
Start: 2022-05-17

## (undated) RX ORDER — NEOMYCIN/BACITRACIN/POLYMYXINB 3.5-400-5K
OINTMENT (GRAM) TOPICAL
Status: DISPENSED
Start: 2022-08-23

## (undated) RX ORDER — SODIUM CHLORIDE 9 MG/ML
INJECTION, SOLUTION INTRAVENOUS
Status: DISPENSED
Start: 2019-08-07

## (undated) RX ORDER — TRANEXAMIC ACID 650 MG/1
TABLET ORAL
Status: DISPENSED
Start: 2022-05-17

## (undated) RX ORDER — CEFAZOLIN SODIUM/WATER 2 G/20 ML
SYRINGE (ML) INTRAVENOUS
Status: DISPENSED
Start: 2022-05-17

## (undated) RX ORDER — MAGNESIUM CARB/ALUMINUM HYDROX 105-160MG
TABLET,CHEWABLE ORAL
Status: DISPENSED
Start: 2022-05-31

## (undated) RX ORDER — GINSENG 100 MG
CAPSULE ORAL
Status: DISPENSED
Start: 2018-06-23

## (undated) RX ORDER — LIDOCAINE HYDROCHLORIDE 10 MG/ML
INJECTION, SOLUTION INFILTRATION; PERINEURAL
Status: DISPENSED
Start: 2022-01-25

## (undated) RX ORDER — CELECOXIB 100 MG/1
CAPSULE ORAL
Status: DISPENSED
Start: 2022-05-17

## (undated) RX ORDER — MECLIZINE HCL 12.5 MG 12.5 MG/1
TABLET ORAL
Status: DISPENSED
Start: 2019-08-08

## (undated) RX ORDER — ACETAMINOPHEN 325 MG/1
TABLET ORAL
Status: DISPENSED
Start: 2022-05-17

## (undated) RX ORDER — FENTANYL CITRATE-0.9 % NACL/PF 10 MCG/ML
PLASTIC BAG, INJECTION (ML) INTRAVENOUS
Status: DISPENSED
Start: 2022-05-17

## (undated) RX ORDER — LIDOCAINE HYDROCHLORIDE 20 MG/ML
INJECTION, SOLUTION EPIDURAL; INFILTRATION; INTRACAUDAL; PERINEURAL
Status: DISPENSED
Start: 2022-05-17

## (undated) RX ORDER — TRIAMCINOLONE ACETONIDE 40 MG/ML
INJECTION, SUSPENSION INTRA-ARTICULAR; INTRAMUSCULAR
Status: DISPENSED
Start: 2022-01-25

## (undated) RX ORDER — PROPOFOL 10 MG/ML
INJECTION, EMULSION INTRAVENOUS
Status: DISPENSED
Start: 2022-05-17

## (undated) RX ORDER — BUPIVACAINE HYDROCHLORIDE 5 MG/ML
INJECTION, SOLUTION EPIDURAL; INTRACAUDAL
Status: DISPENSED
Start: 2022-01-25

## (undated) RX ORDER — LIDOCAINE HYDROCHLORIDE 10 MG/ML
INJECTION, SOLUTION EPIDURAL; INFILTRATION; INTRACAUDAL; PERINEURAL
Status: DISPENSED
Start: 2018-06-23